# Patient Record
Sex: MALE | Race: WHITE | URBAN - METROPOLITAN AREA
[De-identification: names, ages, dates, MRNs, and addresses within clinical notes are randomized per-mention and may not be internally consistent; named-entity substitution may affect disease eponyms.]

---

## 2017-03-01 ENCOUNTER — OUTPATIENT (OUTPATIENT)
Dept: OUTPATIENT SERVICES | Facility: HOSPITAL | Age: 67
LOS: 1 days | Discharge: HOME | End: 2017-03-01

## 2017-06-27 DIAGNOSIS — M65.332 TRIGGER FINGER, LEFT MIDDLE FINGER: ICD-10-CM

## 2020-07-17 ENCOUNTER — INPATIENT (INPATIENT)
Facility: HOSPITAL | Age: 70
LOS: 4 days | Discharge: HOME | End: 2020-07-22
Attending: HOSPITALIST | Admitting: HOSPITALIST
Payer: MEDICARE

## 2020-07-17 VITALS
TEMPERATURE: 98 F | SYSTOLIC BLOOD PRESSURE: 164 MMHG | DIASTOLIC BLOOD PRESSURE: 93 MMHG | RESPIRATION RATE: 19 BRPM | OXYGEN SATURATION: 99 % | HEART RATE: 87 BPM

## 2020-07-17 DIAGNOSIS — E78.00 PURE HYPERCHOLESTEROLEMIA, UNSPECIFIED: Chronic | ICD-10-CM

## 2020-07-17 LAB
ALBUMIN SERPL ELPH-MCNC: 4.5 G/DL — SIGNIFICANT CHANGE UP (ref 3.5–5.2)
ALP SERPL-CCNC: 69 U/L — SIGNIFICANT CHANGE UP (ref 30–115)
ALT FLD-CCNC: 19 U/L — SIGNIFICANT CHANGE UP (ref 0–41)
ANION GAP SERPL CALC-SCNC: 13 MMOL/L — SIGNIFICANT CHANGE UP (ref 7–14)
APTT BLD: 35.3 SEC — SIGNIFICANT CHANGE UP (ref 27–39.2)
AST SERPL-CCNC: 19 U/L — SIGNIFICANT CHANGE UP (ref 0–41)
BASOPHILS # BLD AUTO: 0.08 K/UL — SIGNIFICANT CHANGE UP (ref 0–0.2)
BASOPHILS NFR BLD AUTO: 0.7 % — SIGNIFICANT CHANGE UP (ref 0–1)
BILIRUB SERPL-MCNC: 0.2 MG/DL — SIGNIFICANT CHANGE UP (ref 0.2–1.2)
BUN SERPL-MCNC: 30 MG/DL — HIGH (ref 10–20)
CALCIUM SERPL-MCNC: 9.8 MG/DL — SIGNIFICANT CHANGE UP (ref 8.5–10.1)
CHLORIDE SERPL-SCNC: 102 MMOL/L — SIGNIFICANT CHANGE UP (ref 98–110)
CO2 SERPL-SCNC: 25 MMOL/L — SIGNIFICANT CHANGE UP (ref 17–32)
CREAT SERPL-MCNC: 1.2 MG/DL — SIGNIFICANT CHANGE UP (ref 0.7–1.5)
EOSINOPHIL # BLD AUTO: 0.37 K/UL — SIGNIFICANT CHANGE UP (ref 0–0.7)
EOSINOPHIL NFR BLD AUTO: 3.1 % — SIGNIFICANT CHANGE UP (ref 0–8)
GLUCOSE SERPL-MCNC: 97 MG/DL — SIGNIFICANT CHANGE UP (ref 70–99)
HCT VFR BLD CALC: 36.9 % — LOW (ref 42–52)
HGB BLD-MCNC: 12.1 G/DL — LOW (ref 14–18)
IMM GRANULOCYTES NFR BLD AUTO: 0.4 % — HIGH (ref 0.1–0.3)
INR BLD: 1 RATIO — SIGNIFICANT CHANGE UP (ref 0.65–1.3)
LACTATE SERPL-SCNC: 1.4 MMOL/L — SIGNIFICANT CHANGE UP (ref 0.7–2)
LIDOCAIN IGE QN: 37 U/L — SIGNIFICANT CHANGE UP (ref 7–60)
LYMPHOCYTES # BLD AUTO: 2.41 K/UL — SIGNIFICANT CHANGE UP (ref 1.2–3.4)
LYMPHOCYTES # BLD AUTO: 20 % — LOW (ref 20.5–51.1)
MAGNESIUM SERPL-MCNC: 1.9 MG/DL — SIGNIFICANT CHANGE UP (ref 1.8–2.4)
MCHC RBC-ENTMCNC: 29.4 PG — SIGNIFICANT CHANGE UP (ref 27–31)
MCHC RBC-ENTMCNC: 32.8 G/DL — SIGNIFICANT CHANGE UP (ref 32–37)
MCV RBC AUTO: 89.8 FL — SIGNIFICANT CHANGE UP (ref 80–94)
MONOCYTES # BLD AUTO: 0.8 K/UL — HIGH (ref 0.1–0.6)
MONOCYTES NFR BLD AUTO: 6.6 % — SIGNIFICANT CHANGE UP (ref 1.7–9.3)
NEUTROPHILS # BLD AUTO: 8.35 K/UL — HIGH (ref 1.4–6.5)
NEUTROPHILS NFR BLD AUTO: 69.2 % — SIGNIFICANT CHANGE UP (ref 42.2–75.2)
NRBC # BLD: 0 /100 WBCS — SIGNIFICANT CHANGE UP (ref 0–0)
PLATELET # BLD AUTO: 302 K/UL — SIGNIFICANT CHANGE UP (ref 130–400)
POTASSIUM SERPL-MCNC: 4.4 MMOL/L — SIGNIFICANT CHANGE UP (ref 3.5–5)
POTASSIUM SERPL-SCNC: 4.4 MMOL/L — SIGNIFICANT CHANGE UP (ref 3.5–5)
PROT SERPL-MCNC: 7.5 G/DL — SIGNIFICANT CHANGE UP (ref 6–8)
PROTHROM AB SERPL-ACNC: 11.5 SEC — SIGNIFICANT CHANGE UP (ref 9.95–12.87)
RBC # BLD: 4.11 M/UL — LOW (ref 4.7–6.1)
RBC # FLD: 13 % — SIGNIFICANT CHANGE UP (ref 11.5–14.5)
SODIUM SERPL-SCNC: 140 MMOL/L — SIGNIFICANT CHANGE UP (ref 135–146)
TROPONIN T SERPL-MCNC: <0.01 NG/ML — SIGNIFICANT CHANGE UP
WBC # BLD: 12.06 K/UL — HIGH (ref 4.8–10.8)
WBC # FLD AUTO: 12.06 K/UL — HIGH (ref 4.8–10.8)

## 2020-07-17 PROCEDURE — 99285 EMERGENCY DEPT VISIT HI MDM: CPT

## 2020-07-17 PROCEDURE — 74177 CT ABD & PELVIS W/CONTRAST: CPT | Mod: 26

## 2020-07-17 PROCEDURE — 76705 ECHO EXAM OF ABDOMEN: CPT | Mod: 26

## 2020-07-17 RX ORDER — MORPHINE SULFATE 50 MG/1
4 CAPSULE, EXTENDED RELEASE ORAL ONCE
Refills: 0 | Status: DISCONTINUED | OUTPATIENT
Start: 2020-07-17 | End: 2020-07-17

## 2020-07-17 RX ORDER — FAMOTIDINE 10 MG/ML
20 INJECTION INTRAVENOUS ONCE
Refills: 0 | Status: COMPLETED | OUTPATIENT
Start: 2020-07-17 | End: 2020-07-17

## 2020-07-17 RX ORDER — SODIUM CHLORIDE 9 MG/ML
1000 INJECTION INTRAMUSCULAR; INTRAVENOUS; SUBCUTANEOUS ONCE
Refills: 0 | Status: COMPLETED | OUTPATIENT
Start: 2020-07-17 | End: 2020-07-17

## 2020-07-17 RX ADMIN — SODIUM CHLORIDE 1000 MILLILITER(S): 9 INJECTION INTRAMUSCULAR; INTRAVENOUS; SUBCUTANEOUS at 23:56

## 2020-07-17 RX ADMIN — SODIUM CHLORIDE 1000 MILLILITER(S): 9 INJECTION INTRAMUSCULAR; INTRAVENOUS; SUBCUTANEOUS at 19:04

## 2020-07-17 RX ADMIN — MORPHINE SULFATE 4 MILLIGRAM(S): 50 CAPSULE, EXTENDED RELEASE ORAL at 23:56

## 2020-07-17 RX ADMIN — FAMOTIDINE 20 MILLIGRAM(S): 10 INJECTION INTRAVENOUS at 23:42

## 2020-07-17 NOTE — ED ADULT NURSE NOTE - NSIMPLEMENTINTERV_GEN_ALL_ED
Implemented All Fall Risk Interventions:  [place] to call system. Call bell, personal items and telephone within reach. Instruct patient to call for assistance. Room bathroom lighting operational. Non-slip footwear when patient is off stretcher. Physically safe environment: no spills, clutter or unnecessary equipment. Stretcher in lowest position, wheels locked, appropriate side rails in place. Provide visual cue, wrist band, yellow gown, etc. Monitor gait and stability. Monitor for mental status changes and reorient to person, place, and time. Review medications for side effects contributing to fall risk. Reinforce activity limits and safety measures with patient and family.

## 2020-07-17 NOTE — CONSULT NOTE ADULT - ASSESSMENT
DISTAL ANTRUM 4 CM  FOREIGN BODY  NPO  IVF  PAIN MGMT  GI  CONSULT   DVT/ GI PROPHYLAXIS  WILL FOLLOW  MARC PEREZ

## 2020-07-17 NOTE — ED ADULT NURSE NOTE - HOW OFTEN DO YOU HAVE A DRINK CONTAINING ALCOHOL?
& right thoracic abscess with fistula causing severe sepsis, CT chest reviewed on IV Invanz, Blood cx, wound cx are pending, Lactate wnl  Dr. Mccormick did I&D yesterday, ID is on board, patient has wound vac on, will follow the cultures. Never

## 2020-07-17 NOTE — ED PROVIDER NOTE - PROGRESS NOTE DETAILS
discussed with radiology who states that patient has foreign body in the stomach extending through stomach lining. Surgery consulted Surgical PA consulted on CT findings Patient evaluated by surgery states not surgical intervention at this time. Recommending GI evaluation. Discussed with Dr. Doe Lopez from  who will attempt to reach Dr. Smith who is on call for [facility]. BI: d/w [place] pulm fellow who approved for [facility]. Will admit to [place]. BI: New progression of events. GI, Dr. Man en route to [place] site for bedside endoscopy. Requesting ICU here. Withdrew admission to north. Doe molina approved. ICU resident aware. Hospitalist accepting.

## 2020-07-17 NOTE — ED PROVIDER NOTE - CLINICAL SUMMARY MEDICAL DECISION MAKING FREE TEXT BOX
Patient presents with epigastric pain. labs, ua, ct done. Found to have foreign body in the stomach penetrating into the abdominal cavity. Discussed with surgery Dr. Cordero who signed off on case. Discussed with Dr. Man from GI who is en route to the [place] site to scope the patient. Patient admitted to the ICU for further management.

## 2020-07-17 NOTE — ED PROVIDER NOTE - OBJECTIVE STATEMENT
Patient c/o epigastric today, mild sharp, non radiating, H/o gallstone, No n/v, no chest pain, no SOB, no fever,

## 2020-07-17 NOTE — CONSULT NOTE ADULT - SUBJECTIVE AND OBJECTIVE BOX
· Chief Complaint: The patient is a 69y Male complaining of abdominal pain.	  · HPI Objective Statement: Patient c/o epigastric x 5 days , pain went  away , pain restarted  today after eating , mild sharp, non radiating, H/o gallstone, No n/v, no chest pain, no SOB, no fever,	    HIV:    HIV Test Questions:  · In accordance with [place] State law, we offer every patient who comes to our ED an HIV test. Would you like to be tested today?	Opt out	    PAST MEDICAL/SURGICAL/FAMILY/SOCIAL HISTORY:    Past Medical History:  hld.     Past Surgical History:  none     Tobacco Usage:  · Tobacco Usage	Former smoker	    ALLERGIES AND HOME MEDICATIONS:   Allergies:        Allergies:  	No Known Allergies:     Home Medications:   * Patient Currently Takes Medications as of 17-Jul-2020 17:43 documented in Structured Notes  · 	atorvastatin:      REVIEW OF SYSTEMS:    Review of Systems:  · CONSTITUTIONAL: no fever and no chills.	  · EYES: no discharge, no irritation, no pain, no redness, and no visual changes.	  · ENMT: Ears: no ear pain and no hearing problems.Nose: no nasal congestion and no nasal drainage.Mouth/Throat: no dysphagia, no hoarseness and no throat pain.Neck: no lumps, no pain, no stiffness and no swollen glands.	  · CARDIOVASCULAR: no chest pain and no edema.	  · RESPIRATORY: no chest pain, no cough, and no shortness of breath.	  · GASTROINTESTINAL: - - -	  · Gastrointestinal [+]: ABDOMINAL PAIN	  · GENITOURINARY: no dysuria, no frequency, and no hematuria.	  · MUSCULOSKELETAL: no back pain, no gout, no musculoskeletal pain, no neck pain, and no weakness.	  · NEURO: no loss of consciousness, no gait abnormality, no headache, no sensory deficits, and no weakness.	  · ENDOCRINE: no diabetes and no thyroid trouble.	    PHYSICAL EXAM:  T 98.4, /93,  HR  87, RR 19  · CONSTITUTIONAL: Well appearing, awake, alert, oriented to person, place, time/situation and in no apparent distress.	  · ENMT: Airway patent, Nasal mucosa clear. Mouth with normal mucosa. Throat has no vesicles, no oropharyngeal exudates and uvula is midline.	  · EYES: Clear bilaterally, pupils equal, round and reactive to light.	  · CARDIAC: Normal rate, regular rhythm.  Heart sounds S1, S2.  No murmurs, rubs or gallops.	  · RESPIRATORY: Breath sounds clear and equal bilaterally.	  · GASTROINTESTINAL: Abdomen soft,+ EPIGASTRIC  TENDERNESS no guarding.	  · MUSCULOSKELETAL: Spine appears normal, range of motion is not limited, no muscle or joint tenderness	  · NEUROLOGICAL: Alert and oriented, no focal deficits, no motor or sensory deficits.	  · SKIN: Skin normal color for race, warm, dry and intact. No evidence of rash.	    CURRENT ORDERS/:   · 	sodium chloride 0.9% Bolus, 1000 milliLiter(s), IV Bolus, once, infuse over 1 Hour(s), Stop After 1 Doses  	Provider's Contact #: [phone], 17-Jul-2020, Active, 17-Jul-2020, Standard  · 	Blood Glucose Point Of Care Testing,   Frequency:  once, 17-Jul-2020, Active, Standard    RESULTS:   · EKG Date/Time: 17-Jul-2020 19:58	  · Rate: 75	  · Interpretation: normal sinus rhythm	  · MS: 166	  · QRS: 84	  · ST/T Wave: no aislinn	                        12.1   12.06 )-----------( 302      ( 17 Jul 2020 17:48 )             36.9   07-17    140  |  102  |  30<H>  ----------------------------<  97  4.4   |  25  |  1.2    Ca    9.8      17 Jul 2020 17:48  Mg     1.9     07-17    TPro  7.5  /  Alb  4.5  /  TBili  0.2  /  DBili  x   /  AST  19  /  ALT  19  /  AlkPhos  69  07-17  LIPASE  37, LACTATE  1.4  EXAM:  US ABDOMEN LIMITED            PROCEDURE DATE:  07/17/2020            INTERPRETATION:  CLINICAL HISTORY: Right upper quadrant abdominal pain.     COMPARISON: None.    PROCEDURE: Ultrasound of the right upper quadrant was performed.    FINDINGS:    LIVER: Heterogeneous in echotexture. 1.8 x 2.1 x 2.0 cm indeterminate heterogeneous mass in the right hepatic lobe.    GALLBLADDER/BILIARY TREE:  No evidence of cholelithiasis. Borderline gallbladder wall thickening, measuring 3 to 4 mm. Trace pericholecystic fluid/inflammation.  Negative sonographic Christiansen's sign. No intrahepatic biliary ductal dilatation. The common bile duct measures 5 mm, which is normal.     PANCREAS: Obscured by overlying bowel gas.    KIDNEY:  Right kidney measures 12.3 cm in length. Prominent column of Jose Alberto. No hydronephrosis, calculi or solid mass.    AORTA/IVC:  Visualized proximal portions unremarkable.    ASCITES:  None.    IMPRESSION:    1. Trace pericholecystic fluid/inflammation and borderline gallbladder wall thickening, nonspecific. No definite sonographic evidence for acute cholecystitis.     2. Indeterminate hepatic mass. Nonemergent MRI recommended for further evaluation.              EDSON BRINK M.D., RESIDENT RADIOLOGIST  This document has been electronically signed.  MICHAEL ZAPATA M.D., ATTENDING RADIOLOGIST  This document has been electronically signed. Jul 17 2020  8:31PM        EXAM:  CT ABDOMEN AND PELVIS IC            PROCEDURE DATE:  07/17/2020            INTERPRETATION:  REASON FOR EXAM / CLINICAL STATEMENT: Epigastric pain; WBC 12.06    TECHNIQUE: Contiguous axial CT images were obtained from the lower chest to the pubic symphysis with intravenous contrast.   Reformatted images in the coronal and sagittal planes were acquired.    COMPARISON CT:    OTHER STUDIES USED FOR CORRELATION: None.         FINDINGS    LOWER CHEST: The lung bases are clear. No pleural or pericardial effusion.    HEPATIC: The liver is normal in size with no evidence of bile duct dilatation. There is a 2 cm solid-appearing lesion in the left lobe of liver. Nonemergent MRI of the liver is recommended for further evaluation of this lesion.    BILIARY: No calcified gallstones are noted.      SPLEEN: Unremarkable.        PANCREAS: The pancreas is normal in size and configuration. No evidence of mass or pancreatitis.     ADRENAL GLANDS: Unremarkable.        KIDNEYS: There is symmetric bilateral renal enhancement. No evidence of hydronephrosis, calcified stones, or solid mass. Lobulation of both kidneys noted compatible with scarring.    ABDOMINOPELVIC NODES: Unremarkable.    PELVIC ORGANS: No evidence of pelvic mass, lymphadenopathy, or fluid collection.    PERITONEUM/MESENTERY/BOWEL: A radiopaque linear opacity, compatible with a foreign body, measuring approximately 4 cm in length is seen in the region of the prepyloric region of the distal antrum.. This structure (possibly a fish bone, metallic wire or other foreign body) extends extraluminally toward the region of the sonia hepatis. The foreign body appears to be adjacent to the liver without puncturing the liver.     No evidence of bowel obstruction, colitis, inflammatory process, or ascites within the abdomen or pelvis. There is a nonobstructing ventral hernia. The appendix is normal in appearance.    BONES/SOFT TISSUES: Degenerative changes of the spine are noted.       OTHER: No evidence of abdominal aortic aneurysm.      IMPRESSION:    1. A radiopaque linear opacity, compatible with a foreign body, measuring approximately 4 cm in length is seen in the region of the prepyloric region of the distal antrum.. This structure (possibly a fish bone, metallic wire or other foreign body) extends extraluminally toward the region of the sonia hepatis. The foreign body appears to be adjacent to the liver without puncturing the liver.     2. There is a 2 cm solid-appearing lesion in the left lobe of liver. Nonemergent MRI of the liver is recommended for further evaluation of this lesion. (The renal parenchyma may be evaluated at the same time.)                  NAE MASTERSON M.D., ATTENDING RADIOLOGIST  This document has been electronically signed. Jul 17 2020  8:54PM

## 2020-07-17 NOTE — ED PROVIDER NOTE - ATTENDING CONTRIBUTION TO CARE
68 yo M pmh of HLD presents with epigastric pain. States that 5 days ago developed a severe epigastric and RUQ pain that resolved. Today went to his pmd and had blood work done. When he got home he had something to eat and the pain returned. no n/v/d. normal BM. no fevers. no cp, no sob, no palpitations. pmd is Dr. Arias.     CONSTITUTIONAL: Well-developed; well-nourished; in no acute distress.   SKIN: warm, dry  HEAD: Normocephalic; atraumatic.  EYES: PERRL, EOMI, no conjunctival erythema  ENT: No nasal discharge; airway clear.  NECK: Supple; non tender.  CARD: S1, S2 normal;  Regular rate and rhythm.   RESP: No wheezes, rales or rhonchi.  ABD: soft, + tenderness in epigastric and RUQ, non distended, no rebound or guarding  EXT: Normal ROM.    LYMPH: No acute cervical adenopathy.  NEURO: Alert, oriented, grossly unremarkable.   PSYCH: Cooperative, appropriate.

## 2020-07-18 ENCOUNTER — TRANSCRIPTION ENCOUNTER (OUTPATIENT)
Age: 70
End: 2020-07-18

## 2020-07-18 LAB
ALBUMIN SERPL ELPH-MCNC: 4.7 G/DL — SIGNIFICANT CHANGE UP (ref 3.5–5.2)
ALP SERPL-CCNC: 77 U/L — SIGNIFICANT CHANGE UP (ref 30–115)
ALT FLD-CCNC: 19 U/L — SIGNIFICANT CHANGE UP (ref 0–41)
ANION GAP SERPL CALC-SCNC: 15 MMOL/L — HIGH (ref 7–14)
AST SERPL-CCNC: 22 U/L — SIGNIFICANT CHANGE UP (ref 0–41)
BASOPHILS # BLD AUTO: 0.05 K/UL — SIGNIFICANT CHANGE UP (ref 0–0.2)
BASOPHILS NFR BLD AUTO: 0.4 % — SIGNIFICANT CHANGE UP (ref 0–1)
BILIRUB SERPL-MCNC: 0.4 MG/DL — SIGNIFICANT CHANGE UP (ref 0.2–1.2)
BLD GP AB SCN SERPL QL: SIGNIFICANT CHANGE UP
BUN SERPL-MCNC: 18 MG/DL — SIGNIFICANT CHANGE UP (ref 10–20)
CALCIUM SERPL-MCNC: 9.7 MG/DL — SIGNIFICANT CHANGE UP (ref 8.5–10.1)
CHLORIDE SERPL-SCNC: 100 MMOL/L — SIGNIFICANT CHANGE UP (ref 98–110)
CO2 SERPL-SCNC: 22 MMOL/L — SIGNIFICANT CHANGE UP (ref 17–32)
CREAT SERPL-MCNC: 1.3 MG/DL — SIGNIFICANT CHANGE UP (ref 0.7–1.5)
EOSINOPHIL # BLD AUTO: 0.04 K/UL — SIGNIFICANT CHANGE UP (ref 0–0.7)
EOSINOPHIL NFR BLD AUTO: 0.3 % — SIGNIFICANT CHANGE UP (ref 0–8)
GLUCOSE SERPL-MCNC: 112 MG/DL — HIGH (ref 70–99)
HCT VFR BLD CALC: 38.6 % — LOW (ref 42–52)
HGB BLD-MCNC: 12.7 G/DL — LOW (ref 14–18)
IMM GRANULOCYTES NFR BLD AUTO: 0.4 % — HIGH (ref 0.1–0.3)
LYMPHOCYTES # BLD AUTO: 0.83 K/UL — LOW (ref 1.2–3.4)
LYMPHOCYTES # BLD AUTO: 6.7 % — LOW (ref 20.5–51.1)
MCHC RBC-ENTMCNC: 30 PG — SIGNIFICANT CHANGE UP (ref 27–31)
MCHC RBC-ENTMCNC: 32.9 G/DL — SIGNIFICANT CHANGE UP (ref 32–37)
MCV RBC AUTO: 91 FL — SIGNIFICANT CHANGE UP (ref 80–94)
MONOCYTES # BLD AUTO: 0.13 K/UL — SIGNIFICANT CHANGE UP (ref 0.1–0.6)
MONOCYTES NFR BLD AUTO: 1 % — LOW (ref 1.7–9.3)
NEUTROPHILS # BLD AUTO: 11.32 K/UL — HIGH (ref 1.4–6.5)
NEUTROPHILS NFR BLD AUTO: 91.2 % — HIGH (ref 42.2–75.2)
NRBC # BLD: 0 /100 WBCS — SIGNIFICANT CHANGE UP (ref 0–0)
PLATELET # BLD AUTO: 301 K/UL — SIGNIFICANT CHANGE UP (ref 130–400)
POTASSIUM SERPL-MCNC: 4.9 MMOL/L — SIGNIFICANT CHANGE UP (ref 3.5–5)
POTASSIUM SERPL-SCNC: 4.9 MMOL/L — SIGNIFICANT CHANGE UP (ref 3.5–5)
PROT SERPL-MCNC: 7.8 G/DL — SIGNIFICANT CHANGE UP (ref 6–8)
RBC # BLD: 4.24 M/UL — LOW (ref 4.7–6.1)
RBC # FLD: 12.9 % — SIGNIFICANT CHANGE UP (ref 11.5–14.5)
SARS-COV-2 RNA SPEC QL NAA+PROBE: SIGNIFICANT CHANGE UP
SODIUM SERPL-SCNC: 137 MMOL/L — SIGNIFICANT CHANGE UP (ref 135–146)
WBC # BLD: 12.42 K/UL — HIGH (ref 4.8–10.8)
WBC # FLD AUTO: 12.42 K/UL — HIGH (ref 4.8–10.8)

## 2020-07-18 PROCEDURE — 71045 X-RAY EXAM CHEST 1 VIEW: CPT | Mod: 26

## 2020-07-18 PROCEDURE — 99222 1ST HOSP IP/OBS MODERATE 55: CPT

## 2020-07-18 RX ORDER — CHLORHEXIDINE GLUCONATE 213 G/1000ML
1 SOLUTION TOPICAL DAILY
Refills: 0 | Status: DISCONTINUED | OUTPATIENT
Start: 2020-07-18 | End: 2020-07-22

## 2020-07-18 RX ORDER — CEFTRIAXONE 500 MG/1
1000 INJECTION, POWDER, FOR SOLUTION INTRAMUSCULAR; INTRAVENOUS EVERY 24 HOURS
Refills: 0 | Status: DISCONTINUED | OUTPATIENT
Start: 2020-07-18 | End: 2020-07-18

## 2020-07-18 RX ORDER — SODIUM CHLORIDE 9 MG/ML
1000 INJECTION INTRAMUSCULAR; INTRAVENOUS; SUBCUTANEOUS
Refills: 0 | Status: DISCONTINUED | OUTPATIENT
Start: 2020-07-18 | End: 2020-07-18

## 2020-07-18 RX ORDER — POLYETHYLENE GLYCOL 3350 17 G/17G
17 POWDER, FOR SOLUTION ORAL DAILY
Refills: 0 | Status: DISCONTINUED | OUTPATIENT
Start: 2020-07-18 | End: 2020-07-22

## 2020-07-18 RX ORDER — PANTOPRAZOLE SODIUM 20 MG/1
40 TABLET, DELAYED RELEASE ORAL DAILY
Refills: 0 | Status: DISCONTINUED | OUTPATIENT
Start: 2020-07-18 | End: 2020-07-18

## 2020-07-18 RX ORDER — SENNA PLUS 8.6 MG/1
2 TABLET ORAL AT BEDTIME
Refills: 0 | Status: DISCONTINUED | OUTPATIENT
Start: 2020-07-18 | End: 2020-07-22

## 2020-07-18 RX ORDER — ATORVASTATIN CALCIUM 80 MG/1
0 TABLET, FILM COATED ORAL
Qty: 0 | Refills: 0 | DISCHARGE

## 2020-07-18 RX ORDER — CHLORHEXIDINE GLUCONATE 213 G/1000ML
1 SOLUTION TOPICAL
Refills: 0 | Status: DISCONTINUED | OUTPATIENT
Start: 2020-07-18 | End: 2020-07-18

## 2020-07-18 RX ORDER — METRONIDAZOLE 500 MG
500 TABLET ORAL EVERY 8 HOURS
Refills: 0 | Status: DISCONTINUED | OUTPATIENT
Start: 2020-07-18 | End: 2020-07-18

## 2020-07-18 RX ORDER — ATORVASTATIN CALCIUM 80 MG/1
40 TABLET, FILM COATED ORAL DAILY
Refills: 0 | Status: DISCONTINUED | OUTPATIENT
Start: 2020-07-18 | End: 2020-07-18

## 2020-07-18 RX ORDER — PANTOPRAZOLE SODIUM 20 MG/1
40 TABLET, DELAYED RELEASE ORAL
Refills: 0 | Status: DISCONTINUED | OUTPATIENT
Start: 2020-07-18 | End: 2020-07-20

## 2020-07-18 RX ADMIN — ATORVASTATIN CALCIUM 40 MILLIGRAM(S): 80 TABLET, FILM COATED ORAL at 11:47

## 2020-07-18 RX ADMIN — MORPHINE SULFATE 4 MILLIGRAM(S): 50 CAPSULE, EXTENDED RELEASE ORAL at 00:00

## 2020-07-18 RX ADMIN — SODIUM CHLORIDE 50 MILLILITER(S): 9 INJECTION INTRAMUSCULAR; INTRAVENOUS; SUBCUTANEOUS at 02:01

## 2020-07-18 RX ADMIN — Medication 100 MILLIGRAM(S): at 06:07

## 2020-07-18 RX ADMIN — SODIUM CHLORIDE 50 MILLILITER(S): 9 INJECTION INTRAMUSCULAR; INTRAVENOUS; SUBCUTANEOUS at 04:15

## 2020-07-18 RX ADMIN — PANTOPRAZOLE SODIUM 40 MILLIGRAM(S): 20 TABLET, DELAYED RELEASE ORAL at 11:47

## 2020-07-18 RX ADMIN — CEFTRIAXONE 100 MILLIGRAM(S): 500 INJECTION, POWDER, FOR SOLUTION INTRAMUSCULAR; INTRAVENOUS at 06:04

## 2020-07-18 RX ADMIN — SENNA PLUS 2 TABLET(S): 8.6 TABLET ORAL at 21:40

## 2020-07-18 NOTE — H&P ADULT - ASSESSMENT
70 yo M w/ PMH of HTN and HLD presents to the ED complaining of epigastric pain started three days prior. Admitted to ICU for close monitoring during EGD procedure to retrieve foreign body in distal antrum.    # Epigastric pain likely secondary to foreign body in distal antrum  - as per signup, GI planned for EGD to retrieve foreign body   - GI f/u   - surgery f/u   - keep active type and screen  - check INR, PTT, CBC and CMP    # Leukocytosis and subjective chills and fever  - start abx w/ ceftriaxone and flagyl to cover for intraabdominal infection  - trend CBC  - obtain blood culture    # 2 cm liver lesion  - discussed with patient, OP MRI    # HTN   - hold lisinopril for now     # HLD   - c/w statin     Diet: NPO  GI ppx: Protonix 40 mg qD  DVT ppx: Hold for now   Activity: Increase as tolerated  Code status: Full Code ( X ) / DNR (  ) / DNI (  )  Disposition: requires ICU monitoring.

## 2020-07-18 NOTE — H&P ADULT - NSHPPHYSICALEXAM_GEN_ALL_CORE
PHYSICAL EXAM:  GENERAL: NAD, AAO x 4, 69y M  HEAD:  Atraumatic, Normocephalic  EYES: EOMI, conjunctiva clear and sclera white  NECK: Supple, No JVD  CHEST/LUNG: Clear to auscultation bilaterally; No wheeze; No crackles; No accessory muscles used  HEART: Regular rate and rhythm; No murmurs;   ABDOMEN: Soft, Nondistended; Bowel sounds present; No guarding  EXTREMITIES:  2+ Peripheral Pulses, No cyanosis or edema  NEUROLOGY: non-focal

## 2020-07-18 NOTE — CONSULT NOTE ADULT - ATTENDING COMMENTS
As noted above, patient transferred to [facility] for intervention.
70 yo male patient.  He ate fish in early July and BBQ.  Developed abdominal pian that became worse yesterday.  [place]  Stable and abdomen soft, NT, ND.    CT scan with foreign body through antrum into abdominal cavity (half and half), anterior to sonia hepatis.    a/P;  GI to scope patient today for extraction.  We will standby in case laparoscopy is needed.  NPO, IVF.
Patient seen and examined, agree with above, epigastric pain ( foreign body/ stomach, ? bone ), GI eval for ED if no procedure today, downgrade to floor

## 2020-07-18 NOTE — CONSULT NOTE ADULT - ASSESSMENT
IMPRESSION:    Epigastric pain - Likely foreign body ? in the prepyloric region of the distal antrum  Elevated White COUNT ; No fever - doubt infection      PLAN:    CNS: Avoid CNS depressants.      HEENT: Oral care    PULMONARY:  HOB @ 45 degrees. Aspiration Precautions . CXR today     CARDIOVASCULAR: c/w NS @ 50     GI: GI prophylaxis.  Feeding NPO  . EGD today . IR and surgery on board as requested by GI    RENAL:  Follow up lytes.  Correct as needed    INFECTIOUS DISEASE: Follow up cultures. ABX Keep rocephin , can d/c flagyl. Order Procalcitonin      HEMATOLOGICAL:  F/U Labs today . DVT prophylaxis.    ENDOCRINE:  Follow up FS.  Insulin protocol if needed.    MUSCULOSKELETAL: Bedrest , OOB to chair    Smoking cessation advised    Benitez: No benitez  Lines: Peripheral IV   Code status: Full code  Disposition: Disposition post EGD IMPRESSION:    Epigastric pain - foreign body ? / bone in the prepyloric region of the distal antrum  Elevated White COUNT ; No fever - doubt infection      PLAN:    CNS: Avoid CNS depressants.      HEENT: Oral care    PULMONARY:  HOB @ 45 degrees. Aspiration Precautions . CXR today     CARDIOVASCULAR: c/w NS, LA    GI: GI prophylaxis.  Feeding NPO  . EGD today . IR and surgery on board as requested by GI    RENAL:  Follow up lytes.  Correct as needed    INFECTIOUS DISEASE: Follow up cultures.    HEMATOLOGICAL:  F/U Labs today . DVT prophylaxis.    ENDOCRINE:  Follow up FS.  Insulin protocol if needed.    MUSCULOSKELETAL: Bedrest , OOB to chair    Smoking cessation advised    Benitez: No benitez  Lines: Peripheral IV   Code status: Full code  Disposition: Disposition post EGD

## 2020-07-18 NOTE — CHART NOTE - NSCHARTNOTEFT_GEN_A_CORE
Endoscopy was performed in OR. Foreign body not seen during examination, but an area in the antrum looks inflamed and edematous that may correspond to the foreign body that is embedded in the antral mucosa.   Procedure terminated.   Plan:   Case discussed with advanced GI Dr Graff and patient will be scheduled for Procedure on monday for EUS/ foreign body removal   Clear liquid diet now   NPO after midnight on sunday   PPI once daily

## 2020-07-18 NOTE — CONSULT NOTE ADULT - SUBJECTIVE AND OBJECTIVE BOX
YUMIKO GALINDO [id number]  69y Male  1d    HPI:  68 yo M w/ PMH of HTN and HLD presents to the ED complaining of epigastric pain started three days prior. Pt describes the epigastric pain to be dull and intermittent. Nothing makes it better or worsen. The story goes back to July 4th when he had a party where he ate a lot of food. He started developing the same epigastric pain he presented with today but quickly went away next day. Pt didn't have any more epigastric pain until today. Pt denies fever, CP, SOB, palpitation, diarrhea, constipation.     In ED, pt underwent CT AP which shows a foreign body. GI consulted and recommended endoscopy, with surgery onboard due the high risk nature of this procedure    Vital Signs Last 24 Hrs  T(C): 37.4 (17 Jul 2020 21:45), Max: 37.4 (17 Jul 2020 21:45)  T(F): 99.3 (17 Jul 2020 21:45), Max: 99.3 (17 Jul 2020 21:45)  HR: 88 (17 Jul 2020 23:55) (77 - 88)  BP: 148/82 (17 Jul 2020 23:55) (148/82 - 164/93)  BP(mean): --  RR: 18 (17 Jul 2020 23:55) (18 - 19)  SpO2: 97% (17 Jul 2020 23:55) (97% - 99%)    PAST MEDICAL & SURGICAL HISTORY:  Hypertension  High blood cholesterol    MEDICATIONS  (STANDING):  atorvastatin Oral Tab/Cap - Peds 40 milliGRAM(s) Oral daily  cefTRIAXone   IVPB 1000 milliGRAM(s) IV Intermittent every 24 hours  metroNIDAZOLE  IVPB 500 milliGRAM(s) IV Intermittent every 8 hours  sodium chloride 0.9%. 1000 milliLiter(s) (50 mL/Hr) IV Continuous <Continuous>    MEDICATIONS  (PRN):    Allergies    No Known Allergies    REVIEW OF SYSTEMS    [x ] A ten-point review of systems was otherwise negative except as noted.  [ ] Due to altered mental status/intubation, subjective information were not able to be obtained from the patient. History was obtained, to the extent possible, from review of the chart and collateral sources of information.      Vital Signs Last 24 Hrs  T(C): 36.4 (18 Jul 2020 04:00), Max: 37.4 (17 Jul 2020 21:45)  T(F): 97.6 (18 Jul 2020 04:00), Max: 99.3 (17 Jul 2020 21:45)  HR: 76 (18 Jul 2020 04:00) (72 - 88)  BP: 159/80 (18 Jul 2020 04:00) (127/82 - 164/93)  BP(mean): 112 (18 Jul 2020 04:00) (112 - 112)  RR: 18 (18 Jul 2020 04:00) (16 - 19)  SpO2: 97% (18 Jul 2020 04:00) (97% - 99%)    PHYSICAL EXAM:  GENERAL: NAD, well-appearing  CHEST/LUNG: Clear to auscultation bilaterally  HEART: Regular rate and rhythm  ABDOMEN: Soft, Nontender, Nondistended;     LABS:                      12.1   12.06 )-----------( 302      ( 17 Jul 2020 17:48 )             36.9       Auto Neutrophil %: 69.2 % (07-17-20 @ 17:48)  Auto Immature Granulocyte %: 0.4 % (07-17-20 @ 17:48)    07-17    140  |  102  |  30<H>  ----------------------------<  97  4.4   |  25  |  1.2      Calcium, Total Serum: 9.8 mg/dL (07-17-20 @ 17:48)      LFTs:             7.5  | 0.2  | 19       ------------------[69      ( 17 Jul 2020 17:48 )  4.5  | x    | 19          Lipase:37     Amylase:x         Lactate, Blood: 1.4 mmol/L (07-17-20 @ 17:48)      Coags:     11.50  ----< 1.00    ( 17 Jul 2020 17:48 )     35.3        CARDIAC MARKERS ( 17 Jul 2020 17:48 )  x     / <0.01 ng/mL / x     / x     / x          RADIOLOGY & ADDITIONAL STUDIES:  < from: CT Abdomen and Pelvis w/ IV Cont (07.17.20 @ 19:49) >    EXAM:  CT ABDOMEN AND PELVIS IC            PROCEDURE DATE:  07/17/2020            INTERPRETATION:  REASON FOR EXAM / CLINICAL STATEMENT: Epigastric pain; WBC 12.06    TECHNIQUE: Contiguous axial CT images were obtained from the lower chest to the pubic symphysis with intravenous contrast.   Reformatted images in the coronal and sagittal planes were acquired.    COMPARISON CT:    OTHER STUDIES USED FOR CORRELATION: None.         FINDINGS    LOWER CHEST: The lung bases are clear. No pleural or pericardial effusion.    HEPATIC: The liver is normal in size with no evidence of bile duct dilatation. There is a 2 cm solid-appearing lesion in the left lobe of liver. Nonemergent MRI of the liver is recommended for further evaluation of this lesion.    PERITONEUM/MESENTERY/BOWEL: A radiopaque linear opacity, compatible with a foreign body, measuring approximately 4 cm in length is seen in the region of the prepyloric region of the distal antrum.. This structure (possibly a fish bone, metallic wire or other foreign body) extends extraluminally toward the region of the sonia hepatis. The foreign body appears to be adjacent to the liver without puncturing the liver.   No evidence of bowel obstruction, colitis, inflammatory process, or ascites within the abdomen or pelvis. There is a nonobstructing ventral hernia. The appendix is normal in appearance.    IMPRESSION:  1. A radiopaque linear opacity, compatible with a foreign body, measuring approximately 4 cm in length is seen in the region of the prepyloric region of the distal antrum.. This structure (possibly a fish bone, metallic wire or other foreign body) extends extraluminally toward the region of the sonia hepatis. The foreign body appears to be adjacent to the liver without puncturing the liver.   2. There is a 2 cm solid-appearing lesion in the left lobe of liver. Nonemergent MRI of the liver is recommended for further evaluation of this lesion. (The renal parenchyma may be evaluated at the same time.)

## 2020-07-18 NOTE — CONSULT NOTE ADULT - ASSESSMENT
68 yo M w/ PMH of HTN and HLD presents to the ED complaining of epigastric pain started three days prior, found to have foreign body prepyloric region abutting the sonia hepatis. Admitted to ICU for close monitoring during EGD procedure    PLAN:  - will follow with GI regarding EGD and possibility of removing foreign body  - c/w with management per ICU team  - c/w pain control  - c/w hemodynamic monitoring  - c/w DVT, GI PPX 68 yo M w/ PMH of HTN and HLD presents to the ED complaining of epigastric pain started three days prior, found to have foreign body prepyloric region abutting the sonia hepatis. Admitted to ICU for close monitoring during EGD procedure    PLAN:  - will follow with GI regarding EGD and possibility of removing foreign body  - c/w with management per ICU team  - c/w pain control  - c/w hemodynamic monitoring  - c/w DVT, GI PPX    Senior Resident Addendum  69M presenting w/ chief complaint of worsening epigastric pain. He reports first episode of similar pain approximately 2 weeks ago after eating a large meal. He noted recurrence of this sharp epigastric pain yesterday during the day and reports eating sole on Tuesday and ahi tuna on Wednesday. Denies current abdominal pain. CT AP w/ FB in prepyloric region abutting sonia hepatis. Surgery consulted in case of possible need for surgical intervention. Will follow. Case d/w Dr. Dockery, MICU, patient.

## 2020-07-18 NOTE — H&P ADULT - HISTORY OF PRESENT ILLNESS
68 yo M w/ PMH of HTN and HLD presents to the ED complaining of epigastric pain started three days prior. Pt describes the epigastric pain to be dull and intermittent. Nothing makes it better or worsen. The story goes back to July 4th when he had a party where he ate a lot of food. He started developing the same epigastric pain he presented with today but quickly went away next day. Pt didn't have any more epigastric pain until today. Pt admits to have occasional chill. Pt denies fever, CP, SOB, palpitation, diarrhea, constipation.     Vital Signs Last 24 Hrs  T(C): 37.4 (17 Jul 2020 21:45), Max: 37.4 (17 Jul 2020 21:45)  T(F): 99.3 (17 Jul 2020 21:45), Max: 99.3 (17 Jul 2020 21:45)  HR: 88 (17 Jul 2020 23:55) (77 - 88)  BP: 148/82 (17 Jul 2020 23:55) (148/82 - 164/93)  BP(mean): --  RR: 18 (17 Jul 2020 23:55) (18 - 19)  SpO2: 97% (17 Jul 2020 23:55) (97% - 99%)    In ED, pt underwent CT AP which shows a foreign body. Surgery consulted and recommended no sx at this time. GI consulted and recommended endoscopy. ICU upgrade w/ surgery by bedside during procedure due to high risk EGD.

## 2020-07-18 NOTE — CONSULT NOTE ADULT - SUBJECTIVE AND OBJECTIVE BOX
Patient is a 69y old  Male who presents with a chief complaint of Epigastric pain (18 Jul 2020 04:42)      HPI:  70 yo M w/ PMH of HTN and HLD presents to the ED complaining of epigastric pain started three days prior. Pt describes the epigastric pain to be dull and intermittent. Nothing makes it better or worsen. The story goes back to July 4th when he had a party where he ate a lot of food. He started developing the same epigastric pain he presented with today but quickly went away next day. Pt didn't have any more epigastric pain until today. Pt admits to have occasional chill. Pt denies fever, CP, SOB, palpitation, diarrhea, constipation.   reports h/o chocking on chicken bone [place]     Vital Signs Last 24 Hrs  T(C): 37.4 (17 Jul 2020 21:45), Max: 37.4 (17 Jul 2020 21:45)  T(F): 99.3 (17 Jul 2020 21:45), Max: 99.3 (17 Jul 2020 21:45)  HR: 88 (17 Jul 2020 23:55) (77 - 88)  BP: 148/82 (17 Jul 2020 23:55) (148/82 - 164/93)  BP(mean): --  RR: 18 (17 Jul 2020 23:55) (18 - 19)  SpO2: 97% (17 Jul 2020 23:55) (97% - 99%)    In ED, pt underwent CT AP which shows a foreign body. Surgery consulted and recommended no sx at this time. GI consulted and recommended endoscopy. ICU upgrade w/ surgery by bedside during procedure due to high risk EGD. (18 Jul 2020 00:46)      PAST MEDICAL & SURGICAL HISTORY:  Hypertension  High blood cholesterol      SOCIAL HX:   Smoking  active everyday smoker                       ETOH occasional                            Other    FAMILY HISTORY:  :  No known cardiovascular family history     ROS:  See HPI     Allergies    No Known Allergies    Intolerances          PHYSICAL EXAM    ICU Vital Signs Last 24 Hrs  T(C): 36.4 (18 Jul 2020 04:00), Max: 37.4 (17 Jul 2020 21:45)  T(F): 97.6 (18 Jul 2020 04:00), Max: 99.3 (17 Jul 2020 21:45)  HR: 74 (18 Jul 2020 07:00) (72 - 88)  BP: 157/87 (18 Jul 2020 07:00) (127/82 - 172/90)  BP(mean): 118 (18 Jul 2020 07:00) (104 - 121)  ABP: --  ABP(mean): --  RR: 14 (18 Jul 2020 07:00) (14 - 31)  SpO2: 97% (18 Jul 2020 07:00) (97% - 99%)      General: In NAD   HEENT:  TRACEY              Lungs: Bilateral BS  Cardiovascular: Regular  Gastrointestinal: Soft, Positive BS  Musculoskeletal: No clubbing.  Moves all extremities.    Skin: Warm.  Intact  Neurological: No motor or sensory deficit       07-17-20 @ 07:01  -  07-18-20 @ 07:00  --------------------------------------------------------  IN:    IV PiggyBack: 150 mL    sodium chloride 0.9%.: 200 mL  Total IN: 350 mL    OUT:  Total OUT: 0 mL    Total NET: 350 mL      07-18-20 @ 07:01  -  07-18-20 @ 08:01  --------------------------------------------------------  IN:    sodium chloride 0.9%.: 100 mL  Total IN: 100 mL    OUT:  Total OUT: 0 mL    Total NET: 100 mL          LABS:                          12.1   12.06 )-----------( 302      ( 17 Jul 2020 17:48 )             36.9                12.1   12.06 )-----------( 302      ( 07-17 @ 17:48 )             36.9                                               07-17    140  |  102  |  30<H>  ----------------------------<  97  4.4   |  25  |  1.2    Ca    9.8      17 Jul 2020 17:48  Mg     1.9     07-17    TPro  7.5  /  Alb  4.5  /  TBili  0.2  /  DBili  x   /  AST  19  /  ALT  19  /  AlkPhos  69  07-17      PT/INR - ( 17 Jul 2020 17:48 )   PT: 11.50 sec;   INR: 1.00 ratio         PTT - ( 17 Jul 2020 17:48 )  PTT:35.3 sec                                           CARDIAC MARKERS ( 17 Jul 2020 17:48 )  x     / <0.01 ng/mL / x     / x     / x                                                LIVER FUNCTIONS - ( 17 Jul 2020 17:48 )  Alb: 4.5 g/dL / Pro: 7.5 g/dL / ALK PHOS: 69 U/L / ALT: 19 U/L / AST: 19 U/L / GGT: x                                                                                                                                       X-Rays                                                                                     ECHO not done    MEDICATIONS  (STANDING):  atorvastatin Oral Tab/Cap - Peds 40 milliGRAM(s) Oral daily  cefTRIAXone   IVPB 1000 milliGRAM(s) IV Intermittent every 24 hours  chlorhexidine 4% Liquid 1 Application(s) Topical <User Schedule>  metroNIDAZOLE  IVPB 500 milliGRAM(s) IV Intermittent every 8 hours  pantoprazole  Injectable 40 milliGRAM(s) IV Push daily  sodium chloride 0.9%. 1000 milliLiter(s) (50 mL/Hr) IV Continuous <Continuous>    MEDICATIONS  (PRN):        < from: CT Abdomen and Pelvis w/ IV Cont (07.17.20 @ 19:49) >  1. A radiopaque linear opacity, compatible with a foreign body, measuring approximately 4 cm in length is seen in the region of the prepyloric region of the distal antrum.. This structure (possibly a fish bone, metallic wire or other foreign body) extends extraluminally toward the region of the sonia hepatis. The foreign body appears to be adjacent to the liver without puncturing the liver.     2. There is a 2 cm solid-appearing lesion in the left lobe of liver. Nonemergent MRI of the liver is recommended for further evaluation of this lesion. (The renal parenchyma may be evaluated at the same time.)      < end of copied text > Patient is a 69y old  Male who presents with a chief complaint of Epigastric pain (18 Jul 2020 04:42)      HPI:  68 yo M w/ PMH of HTN and HLD presents to the ED complaining of epigastric pain started three days prior. Pt describes the epigastric pain to be dull and intermittent. Nothing makes it better or worsen. The story goes back to July 4th when he had a party where he ate a lot of food. He started developing the same epigastric pain he presented with today but quickly went away next day. Pt didn't have any more epigastric pain until today. Pt admits to have occasional chill. Pt denies fever, CP, SOB, palpitation, diarrhea, constipation.   reports h/o chocking on chicken bone ; transferred from [facility] for advance GI evaluation   In ED, pt underwent CT AP which shows a foreign body. Surgery consulted and recommended no sx at this time. GI consulted and recommended endoscopy. ICU upgrade w/ surgery by bedside during procedure due to high risk EGD. (18 Jul 2020 00:46) ; transferred from [facility] for advance GI evaluation           PAST MEDICAL & SURGICAL HISTORY:  Hypertension  High blood cholesterol      SOCIAL HX:   Smoking  active everyday smoker                       ETOH occasional                            Other    FAMILY HISTORY:  :  No known cardiovascular family history     ROS:  See HPI     Allergies    No Known Allergies    Intolerances          PHYSICAL EXAM    ICU Vital Signs Last 24 Hrs  T(C): 36.4 (18 Jul 2020 04:00), Max: 37.4 (17 Jul 2020 21:45)  T(F): 97.6 (18 Jul 2020 04:00), Max: 99.3 (17 Jul 2020 21:45)  HR: 74 (18 Jul 2020 07:00) (72 - 88)  BP: 157/87 (18 Jul 2020 07:00) (127/82 - 172/90)  BP(mean): 118 (18 Jul 2020 07:00) (104 - 121)  RR: 14 (18 Jul 2020 07:00) (14 - 31)  SpO2: 97% (18 Jul 2020 07:00) (97% - 99%)      General: In NAD , very comfortable  HEENT:  TRACEY              Lungs: Bilateral BS  Cardiovascular: Regular  Gastrointestinal: Soft, Positive BS  Musculoskeletal: No clubbing.  Moves all extremities.    Skin: Warm.  Intact  Neurological: No motor or sensory deficit       07-17-20 @ 07:01  -  07-18-20 @ 07:00  --------------------------------------------------------  IN:    IV PiggyBack: 150 mL    sodium chloride 0.9%.: 200 mL  Total IN: 350 mL    OUT:  Total OUT: 0 mL    Total NET: 350 mL      07-18-20 @ 07:01  -  07-18-20 @ 08:01  --------------------------------------------------------  IN:    sodium chloride 0.9%.: 100 mL  Total IN: 100 mL    OUT:  Total OUT: 0 mL    Total NET: 100 mL          LABS:                          12.1   12.06 )-----------( 302      ( 17 Jul 2020 17:48 )             36.9                12.1   12.06 )-----------( 302      ( 07-17 @ 17:48 )             36.9                                               07-17    140  |  102  |  30<H>  ----------------------------<  97  4.4   |  25  |  1.2    Ca    9.8      17 Jul 2020 17:48  Mg     1.9     07-17    TPro  7.5  /  Alb  4.5  /  TBili  0.2  /  DBili  x   /  AST  19  /  ALT  19  /  AlkPhos  69  07-17      PT/INR - ( 17 Jul 2020 17:48 )   PT: 11.50 sec;   INR: 1.00 ratio         PTT - ( 17 Jul 2020 17:48 )  PTT:35.3 sec                                           CARDIAC MARKERS ( 17 Jul 2020 17:48 )  x     / <0.01 ng/mL / x     / x     / x                                                LIVER FUNCTIONS - ( 17 Jul 2020 17:48 )  Alb: 4.5 g/dL / Pro: 7.5 g/dL / ALK PHOS: 69 U/L / ALT: 19 U/L / AST: 19 U/L / GGT: x                                                                                                                                       X-Rays                                                                                     ECHO not done    MEDICATIONS  (STANDING):  atorvastatin Oral Tab/Cap - Peds 40 milliGRAM(s) Oral daily  cefTRIAXone   IVPB 1000 milliGRAM(s) IV Intermittent every 24 hours  chlorhexidine 4% Liquid 1 Application(s) Topical <User Schedule>  metroNIDAZOLE  IVPB 500 milliGRAM(s) IV Intermittent every 8 hours  pantoprazole  Injectable 40 milliGRAM(s) IV Push daily  sodium chloride 0.9%. 1000 milliLiter(s) (50 mL/Hr) IV Continuous <Continuous>    MEDICATIONS  (PRN):        < from: CT Abdomen and Pelvis w/ IV Cont (07.17.20 @ 19:49) >  1. A radiopaque linear opacity, compatible with a foreign body, measuring approximately 4 cm in length is seen in the region of the prepyloric region of the distal antrum.. This structure (possibly a fish bone, metallic wire or other foreign body) extends extraluminally toward the region of the sonia hepatis. The foreign body appears to be adjacent to the liver without puncturing the liver.     2. There is a 2 cm solid-appearing lesion in the left lobe of liver. Nonemergent MRI of the liver is recommended for further evaluation of this lesion. (The renal parenchyma may be evaluated at the same time.)      < end of copied text >

## 2020-07-18 NOTE — H&P ADULT - NSHPSOCIALHISTORY_GEN_ALL_CORE
Lives at home.   Daily smoker quitted 5-6 years ago, now smoking every now and then.   Alcohol use 2-3x per week (hard liquid about 1/4 of a bottle)   Denies illicit drug use.

## 2020-07-18 NOTE — CONSULT NOTE ADULT - CONSULT REASON
Epigastric pain
Foreign body ingestion
abdominal pain-r/o foreign body
Foreign body ingestion, GI recommends having surgery onboard

## 2020-07-18 NOTE — CHART NOTE - NSCHARTNOTEFT_GEN_A_CORE
PACU ANESTHESIA ADMISSION NOTE      Procedure: EGD  Post op diagnosis:      ____  Intubated  TV:______       Rate: ______      FiO2: ______    ___X_  Patent Airway    ___X_  Full return of protective reflexes    ____X  Full recovery from anesthesia / back to baseline status    Vitals:  T: 98.1F  HR: 89  BP: 152/56  RR: 16  SpO2: 96%     Mental Status:  _X___ Awake   _____ Alert   _____ Drowsy   _____ Sedated    Nausea/Vomiting:  _X___ NO  ______Yes,   See Post - Op Orders          Pain Scale (0-10):  __0___    Treatment: __X__ None    ____ See Post - Op/PCA Orders    Post - Operative Fluids:   ____ Oral   ___X_ See Post - Op Orders    Plan: Discharge:   ____Home       _____Floor     _____XCritical Care    _____  Other:_________________    Comments: Pt. tolerated procedure well under geta. Transported to ICU, report endorsed to Rn and ICU team.

## 2020-07-18 NOTE — CONSULT NOTE ADULT - SUBJECTIVE AND OBJECTIVE BOX
70 yo M w/ PMH of HTN and HLD presents to the ED complaining of epigastric pain started three days prior. Pt describes the epigastric pain to be dull and intermittent. Nothing makes it better or worsen. Patient reports episode of pain when eating food (including fish) since the 4th of July. He started developing the same epigastric pain he presented with today but quickly went away next day. Pt didn't have any more epigastric pain until today, which prompted him to proceed to [facility] ER. He was found to have a 4cm linear foreign body (suspected fish bone) extending extraluminally toward the region of the sonia hepatis from the gastric antrum.      REVIEW OF SYSTEMS:    Review of Systems:  · CONSTITUTIONAL: no fever and no chills.	  · EYES: no discharge, no irritation, no pain, no redness, and no visual changes.	  · ENMT: Ears: no ear pain and no hearing problems.Nose: no nasal congestion and no nasal drainage.Mouth/Throat: no dysphagia, no hoarseness and no throat pain.Neck: no lumps, no pain, no stiffness and no swollen glands.	  · CARDIOVASCULAR: no chest pain and no edema.	  · RESPIRATORY: no chest pain, no cough, and no shortness of breath.	  · GASTROINTESTINAL: - - -	  · Gastrointestinal [+]: ABDOMINAL PAIN	  · GENITOURINARY: no dysuria, no frequency, and no hematuria.	  · MUSCULOSKELETAL: no back pain, no gout, no musculoskeletal pain, no neck pain, and no weakness.	  · NEURO: no loss of consciousness, no gait abnormality, no headache, no sensory deficits, and no weakness.	  · ENDOCRINE: no diabetes and no thyroid trouble.	    PHYSICAL EXAM:  VSS  · CONSTITUTIONAL: Well appearing, awake, alert, oriented to person, place, time/situation and in no apparent distress.	  · ENMT: Airway patent, Nasal mucosa clear. Mouth with normal mucosa. Throat has no vesicles, no oropharyngeal exudates and uvula is midline.	  · EYES: Clear bilaterally, pupils equal, round and reactive to light.	  · CARDIAC: Normal rate, regular rhythm.  Heart sounds S1, S2.  No murmurs, rubs or gallops.	  · RESPIRATORY: Breath sounds clear and equal bilaterally.	  · GASTROINTESTINAL: Abdomen soft,+ EPIGASTRIC  TENDERNESS no guarding.	  · MUSCULOSKELETAL: Spine appears normal, range of motion is not limited, no muscle or joint tenderness	  · NEUROLOGICAL: Alert and oriented, no focal deficits, no motor or sensory deficits.	  · SKIN: Skin normal color for race, warm, dry and intact. No evidence of rash.

## 2020-07-18 NOTE — CONSULT NOTE ADULT - ASSESSMENT
1. Foreign Body Ingestion:    - CT reviewed with surgery team. Pt currently stable, no abdominal complaints - carries high risk for endoscopic intervention (i.e. extraluminal bleeding if sharp foreign body penetrates extraluminal vasculature). Will arrange procedure in OR setting with adequate surgery and IR on standby in case of severe bleeding. Pt should be transferred to ICU setting for close monitoring, monitor hemodynamic status closely, pRBC transfusion on standy before any intervention to retreive foreign body.  - keep NPO except meds/sips of water, continue IVF.     2. Liver lesion  - MRI recommended for further evaluation.

## 2020-07-18 NOTE — PATIENT PROFILE ADULT - [employer]
Left message on personal answering machine to call the clinic RN.  Thais Gonzalez RN     (4) no limitation

## 2020-07-18 NOTE — H&P ADULT - NSHPLABSRESULTS_GEN_ALL_CORE
LABS:                          12.1   12.06 )-----------( 302      ( 17 Jul 2020 17:48 )             36.9     07-17    140  |  102  |  30<H>  ----------------------------<  97  4.4   |  25  |  1.2    Ca    9.8      17 Jul 2020 17:48  Mg     1.9     07-17    TPro  7.5  /  Alb  4.5  /  TBili  0.2  /  DBili  x   /  AST  19  /  ALT  19  /  AlkPhos  69  07-17            PT/INR - ( 17 Jul 2020 17:48 )   PT: 11.50 sec;   INR: 1.00 ratio         PTT - ( 17 Jul 2020 17:48 )  PTT:35.3 sec  Lactate Trend  07-17 @ 17:48 Lactate:1.4     CARDIAC MARKERS ( 17 Jul 2020 17:48 )  x     / <0.01 ng/mL / x     / x     / x          CAPILLARY BLOOD GLUCOSE      RADIOLOGY:    < from: CT Abdomen and Pelvis w/ IV Cont (07.17.20 @ 19:49) >    IMPRESSION:  1. A radiopaque linear opacity, compatible with a foreign body, measuring approximately 4 cm in length is seen in the region of the prepyloric region of the distal antrum.. This structure (possibly a fish bone, metallic wire or other foreign body) extends extraluminally toward the region of the sonia hepatis. The foreign body appears to be adjacent to the liver without puncturing the liver.   2. There is a 2 cm solid-appearing lesion in the left lobe of liver. Nonemergent MRI of the liver is recommended for further evaluation of this lesion. (The renal parenchyma may be evaluated at the same time.)  < end of copied text >    < from: US Abdomen Limited (07.17.20 @ 18:44) >    IMPRESSION:  1. Trace pericholecystic fluid/inflammation and borderline gallbladder wall thickening, nonspecific. No definite sonographic evidence for acute cholecystitis.   2. Indeterminate hepatic mass. Nonemergent MRI recommended for further evaluation.  < end of copied text >            EKGS:

## 2020-07-18 NOTE — CHART NOTE - NSCHARTNOTEFT_GEN_A_CORE
HPI:  68 yo M w/ PMH of HTN and HLD presents to the ED complaining of epigastric pain started three days prior. Pt describes the epigastric pain to be dull and intermittent. Nothing makes it better or worsen. The story goes back to July 4th when he had a party where he ate a lot of food. He started developing the same epigastric pain he presented with today but quickly went away next day. Pt didn't have any more epigastric pain until today. Pt admits to have occasional chill. Pt denies fever, CP, SOB, palpitation, diarrhea, constipation.     Vital Signs Last 24 Hrs  T(C): 37.4 (17 Jul 2020 21:45), Max: 37.4 (17 Jul 2020 21:45)  T(F): 99.3 (17 Jul 2020 21:45), Max: 99.3 (17 Jul 2020 21:45)  HR: 88 (17 Jul 2020 23:55) (77 - 88)  BP: 148/82 (17 Jul 2020 23:55) (148/82 - 164/93)  BP(mean): --  RR: 18 (17 Jul 2020 23:55) (18 - 19)  SpO2: 97% (17 Jul 2020 23:55) (97% - 99%)    In ED, pt underwent CT AP which shows a foreign body. Surgery consulted and recommended no sx at this time. GI consulted and recommended endoscopy. ICU upgrade w/ surgery by bedside during procedure due to high risk EGD. (18 Jul 2020 00:46)    Pt is admitted for a foreign body in gastric antrum. GI evaluated patient and recommended pt to be transfer to [facility] w/ surgery by bedside during EGD procedure due to high risk.    # Epigastric pain likely secondary to foreign body in distal antrum  - as per signup, GI planned for EGD to retrieve foreign body   - keep active type and screen  - check INR, PTT, CBC and CMP    # Leukocytosis and subjective chills and fever  - start abx w/ ceftriaxone and flagyl to cover for intraabdominal infection  - trend CBC  - obtain blood culture    # 2 cm liver lesion  - discussed with patient, OP MRI    # HTN   - hold lisinopril for now     # HLD   - c/w statin     Diet: NPO for now  GI ppx: Protonix 40 mg qD  DVT ppx: Hold for now   Activity: Increase as tolerated  Code status: Full Code ( X ) / DNR (  ) / DNI (  )  Disposition: transfer to [facility]    Signout given to Dr. Cervantes at 1:51.

## 2020-07-18 NOTE — H&P ADULT - NSHPREVIEWOFSYSTEMS_GEN_ALL_CORE
REVIEW OF SYSTEMS:  CONSTITUTIONAL: Admits to fever. No weakness, fevers.  EYES/ENT: No visual changes;  No vertigo or throat pain   NECK: No pain or stiffness  RESPIRATORY: No cough, wheezing, hemoptysis; No shortness of breath  CARDIOVASCULAR: No chest pain or palpitations  GASTROINTESTINAL: Admits dull epigastric pain. No nausea, vomiting, or hematemesis; No diarrhea or constipation. No melena or hematochezia.  GENITOURINARY: No dysuria, frequency or hematuria  NEUROLOGICAL: No numbness or weakness  SKIN: No itching, rashes

## 2020-07-19 LAB
ALBUMIN SERPL ELPH-MCNC: 4.3 G/DL — SIGNIFICANT CHANGE UP (ref 3.5–5.2)
ALP SERPL-CCNC: 68 U/L — SIGNIFICANT CHANGE UP (ref 30–115)
ALT FLD-CCNC: 16 U/L — SIGNIFICANT CHANGE UP (ref 0–41)
ANION GAP SERPL CALC-SCNC: 16 MMOL/L — HIGH (ref 7–14)
AST SERPL-CCNC: 24 U/L — SIGNIFICANT CHANGE UP (ref 0–41)
BASOPHILS # BLD AUTO: 0.01 K/UL — SIGNIFICANT CHANGE UP (ref 0–0.2)
BASOPHILS NFR BLD AUTO: 0.1 % — SIGNIFICANT CHANGE UP (ref 0–1)
BILIRUB SERPL-MCNC: 0.5 MG/DL — SIGNIFICANT CHANGE UP (ref 0.2–1.2)
BUN SERPL-MCNC: 20 MG/DL — SIGNIFICANT CHANGE UP (ref 10–20)
CALCIUM SERPL-MCNC: 9.3 MG/DL — SIGNIFICANT CHANGE UP (ref 8.5–10.1)
CHLORIDE SERPL-SCNC: 101 MMOL/L — SIGNIFICANT CHANGE UP (ref 98–110)
CO2 SERPL-SCNC: 20 MMOL/L — SIGNIFICANT CHANGE UP (ref 17–32)
CREAT SERPL-MCNC: 1.2 MG/DL — SIGNIFICANT CHANGE UP (ref 0.7–1.5)
EOSINOPHIL # BLD AUTO: 0 K/UL — SIGNIFICANT CHANGE UP (ref 0–0.7)
EOSINOPHIL NFR BLD AUTO: 0 % — SIGNIFICANT CHANGE UP (ref 0–8)
GLUCOSE SERPL-MCNC: 120 MG/DL — HIGH (ref 70–99)
HCT VFR BLD CALC: 36 % — LOW (ref 42–52)
HGB BLD-MCNC: 11.7 G/DL — LOW (ref 14–18)
IMM GRANULOCYTES NFR BLD AUTO: 0.3 % — SIGNIFICANT CHANGE UP (ref 0.1–0.3)
LYMPHOCYTES # BLD AUTO: 0.99 K/UL — LOW (ref 1.2–3.4)
LYMPHOCYTES # BLD AUTO: 10.9 % — LOW (ref 20.5–51.1)
MAGNESIUM SERPL-MCNC: 2.2 MG/DL — SIGNIFICANT CHANGE UP (ref 1.8–2.4)
MCHC RBC-ENTMCNC: 29.2 PG — SIGNIFICANT CHANGE UP (ref 27–31)
MCHC RBC-ENTMCNC: 32.5 G/DL — SIGNIFICANT CHANGE UP (ref 32–37)
MCV RBC AUTO: 89.8 FL — SIGNIFICANT CHANGE UP (ref 80–94)
MONOCYTES # BLD AUTO: 0.32 K/UL — SIGNIFICANT CHANGE UP (ref 0.1–0.6)
MONOCYTES NFR BLD AUTO: 3.5 % — SIGNIFICANT CHANGE UP (ref 1.7–9.3)
NEUTROPHILS # BLD AUTO: 7.72 K/UL — HIGH (ref 1.4–6.5)
NEUTROPHILS NFR BLD AUTO: 85.2 % — HIGH (ref 42.2–75.2)
NRBC # BLD: 0 /100 WBCS — SIGNIFICANT CHANGE UP (ref 0–0)
PHOSPHATE SERPL-MCNC: 3.9 MG/DL — SIGNIFICANT CHANGE UP (ref 2.1–4.9)
PLATELET # BLD AUTO: 307 K/UL — SIGNIFICANT CHANGE UP (ref 130–400)
POTASSIUM SERPL-MCNC: 4.6 MMOL/L — SIGNIFICANT CHANGE UP (ref 3.5–5)
POTASSIUM SERPL-SCNC: 4.6 MMOL/L — SIGNIFICANT CHANGE UP (ref 3.5–5)
PROCALCITONIN SERPL-MCNC: 0.03 NG/ML — SIGNIFICANT CHANGE UP (ref 0.02–0.1)
PROT SERPL-MCNC: 7.2 G/DL — SIGNIFICANT CHANGE UP (ref 6–8)
RBC # BLD: 4.01 M/UL — LOW (ref 4.7–6.1)
RBC # FLD: 12.7 % — SIGNIFICANT CHANGE UP (ref 11.5–14.5)
SODIUM SERPL-SCNC: 137 MMOL/L — SIGNIFICANT CHANGE UP (ref 135–146)
WBC # BLD: 9.07 K/UL — SIGNIFICANT CHANGE UP (ref 4.8–10.8)
WBC # FLD AUTO: 9.07 K/UL — SIGNIFICANT CHANGE UP (ref 4.8–10.8)

## 2020-07-19 PROCEDURE — 99233 SBSQ HOSP IP/OBS HIGH 50: CPT

## 2020-07-19 RX ORDER — SODIUM CHLORIDE 9 MG/ML
1000 INJECTION, SOLUTION INTRAVENOUS
Refills: 0 | Status: DISCONTINUED | OUTPATIENT
Start: 2020-07-19 | End: 2020-07-22

## 2020-07-19 RX ORDER — AMLODIPINE BESYLATE 2.5 MG/1
5 TABLET ORAL DAILY
Refills: 0 | Status: DISCONTINUED | OUTPATIENT
Start: 2020-07-19 | End: 2020-07-22

## 2020-07-19 RX ADMIN — AMLODIPINE BESYLATE 5 MILLIGRAM(S): 2.5 TABLET ORAL at 12:47

## 2020-07-19 RX ADMIN — POLYETHYLENE GLYCOL 3350 17 GRAM(S): 17 POWDER, FOR SOLUTION ORAL at 06:50

## 2020-07-19 RX ADMIN — PANTOPRAZOLE SODIUM 40 MILLIGRAM(S): 20 TABLET, DELAYED RELEASE ORAL at 06:36

## 2020-07-19 RX ADMIN — SENNA PLUS 2 TABLET(S): 8.6 TABLET ORAL at 21:32

## 2020-07-19 NOTE — PROGRESS NOTE ADULT - SUBJECTIVE AND OBJECTIVE BOX
Progress Note: General Surgery  Patient: YUMIKO GALINDO , 69y (1950)Male   MRN: [id number]  Location: [place] [address] A  Visit: 07-17-20 Inpatient  Date: 07-19-20 @ 05:38    Procedure/Diagnosis: Foreign body ingestion    Events/ 24h: No acute events overnight, Patient underwent EGD but was unsuccessful. Foreign body was embedded within the mucosa of the stomach so a EUS ultrasound will be planned for Monday by [facility] Pain controlled.    Vitals: T(F): 97.9 (07-19-20 @ 04:00), Max: 98.8 (07-18-20 @ 13:06)  HR: 68 (07-19-20 @ 05:00)  BP: 148/78 (07-19-20 @ 05:00) (116/62 - 193/93)  RR: 20 (07-19-20 @ 05:00)  SpO2: 97% (07-19-20 @ 05:00)    In:   07-17-20 @ 07:01  -  07-18-20 @ 07:00  --------------------------------------------------------  IN: 350 mL    07-18-20 @ 07:01  -  07-19-20 @ 05:38  --------------------------------------------------------  IN: 350 mL      Out:   07-17-20 @ 07:01  -  07-18-20 @ 07:00  --------------------------------------------------------  OUT:  Total OUT: 0 mL      07-18-20 @ 07:01  -  07-19-20 @ 05:38  --------------------------------------------------------  OUT:  Total OUT: 0 mL        Net:   07-17-20 @ 07:01  -  07-18-20 @ 07:00  --------------------------------------------------------  NET: 350 mL    07-18-20 @ 07:01  -  07-19-20 @ 05:38  --------------------------------------------------------  NET: 350 mL        Diet: Diet, Clear Liquid (07-18-20 @ 19:26)    IV Fluids:     Physical Examination:  General Appearance: NAD  HEENT: EOMI, sclera non-icteric.  Heart: RRR   Lungs: CTABL.   Abdomen:  Soft, nontender, nondistended.   MSK/Extremities: Warm & well-perfused.   Skin: Warm, dry. No jaundice.       Medications: [Standing]  chlorhexidine 4% Liquid 1 Application(s) Topical daily  pantoprazole    Tablet 40 milliGRAM(s) Oral before breakfast  senna 2 Tablet(s) Oral at bedtime    DVT Prophylaxis:   GI Prophylaxis: pantoprazole    Tablet 40 milliGRAM(s) Oral before breakfast    Antibiotics:   Anticoagulation:   Medications:[PRN]  polyethylene glycol 3350 17 Gram(s) Oral daily PRN      Labs:                        12.7   12.42 )-----------( 301      ( 18 Jul 2020 16:57 )             38.6     07-18    137  |  100  |  18  ----------------------------<  112<H>  4.9   |  22  |  1.3    Ca    9.7      18 Jul 2020 16:57  Mg     1.9     07-17    TPro  7.8  /  Alb  4.7  /  TBili  0.4  /  DBili  x   /  AST  22  /  ALT  19  /  AlkPhos  77  07-18    LIVER FUNCTIONS - ( 18 Jul 2020 16:57 )  Alb: 4.7 g/dL / Pro: 7.8 g/dL / ALK PHOS: 77 U/L / ALT: 19 U/L / AST: 22 U/L / GGT: x           PT/INR - ( 17 Jul 2020 17:48 )   PT: 11.50 sec;   INR: 1.00 ratio         PTT - ( 17 Jul 2020 17:48 )  PTT:35.3 sec    CARDIAC MARKERS ( 17 Jul 2020 17:48 )  x     / <0.01 ng/mL / x     / x     / x          Urine/Micro:        Imaging:     no new images        Date/Time: 07-19-20 @ 05:38

## 2020-07-19 NOTE — CHART NOTE - NSCHARTNOTEFT_GEN_A_CORE
ICU DOWNGRADE NOTE:    69y Male transferred to floor from ICU    Patient is a 69y old Male who presents with a chief complaint of Epigastric pain     The patient is currently admitted for the primary diagnosis of Foreign body in stomach    The patient was admitted to the unit for 2 Days.    The patient was never intubated, and was never on pressors.    Indwelling vascular catheters:     Urinary Catheter: none    Disposition: floor    Code Status: FULL    ICU COURSE OF EVENTS:  -------------------------------------------------------------------------------------------    70 yo M w/ PMH of HTN and HLD presents to the ED complaining of epigastric pain started three days prior.  Pt denies fever, CP, SOB, palpitation, diarrhea, constipation.     In ED, pt underwent CT AP which shows a foreign body. Surgery consulted and recommended no sx at this time. GI consulted and recommended endoscopy.  EGD:  7/18 showed erythema and edematous area in the antrum most likely corresponding to the foreign body embedded in the antral mucosa. The foreign body could not be seen.  Clear liquid diet and NPO after midnight   PPI once daily   Plan for EUS/EGD with foreign body removal by advanced GI Dr Graff       -------------------------------------------------------------------------------------------    Current workup in progress:    SIGN OUT AT 07-19-20 @ 18:40 GIVEN TO:

## 2020-07-19 NOTE — PROGRESS NOTE ADULT - SUBJECTIVE AND OBJECTIVE BOX
We are following the patient for Foreign body in stomach      GI HPI Today:  Pt feels good. Tolerating clear liquid diet   No overnight events     PAST MEDICAL & SURGICAL HISTORY  Hypertension  High blood cholesterol      ALLERGIES:  No Known Allergies      MEDICATIONS:  MEDICATIONS  (STANDING):  amLODIPine   Tablet 5 milliGRAM(s) Oral daily  chlorhexidine 4% Liquid 1 Application(s) Topical daily  lactated ringers. 1000 milliLiter(s) (75 mL/Hr) IV Continuous <Continuous>  pantoprazole    Tablet 40 milliGRAM(s) Oral before breakfast  senna 2 Tablet(s) Oral at bedtime    MEDICATIONS  (PRN):  polyethylene glycol 3350 17 Gram(s) Oral daily PRN Constipation      REVIEW OF SYSTEMS  General:  No fevers  Eyes:  No reported pain   ENT:  No sore throat   NECK: No stiffness   CV:  No chest pain   Resp:  No shortness of breath  GI:  See HPI  :  No dysuria  Muscle:  No weakness  Neuro:  No tingling  Endocrine:  No polyuria  Heme:  No ecchymosis        VITALS:   T(F): 97.1 (07-19 @ 07:44), Max: 99.3 (07-17 @ 21:45)  HR: 86 (07-19 @ 07:44) (60 - 92)  BP: 167/89 (07-19 @ 07:44) (116/62 - 193/93)  BP(mean): 108 (07-19 @ 07:44) (82 - 146)  RR: 31 (07-19 @ 07:44) (14 - 44)  SpO2: 98% (07-19 @ 07:58) (90% - 99%)        PHYSICAL EXAM:  GENERAL:  Appears in no distress  HEENT:  Conjunctivae Anicteric   CHEST:  Full & symmetric excursion  HEART:  NS1, S2,   ABDOMEN:  Soft, non-tender, non-distended  EXTEREMITIES:  no edema  SKIN:  No rash  NEURO:  Alert         Blood Work :                        11.7   9.07  )-----------( 307      ( 19 Jul 2020 04:30 )             36.0     PT/INR - ( 17 Jul 2020 17:48 )  INR: 1.00          PTT - ( 17 Jul 2020 17:48 )  PTT:35.3   07-18    137  |  100  |  18  ----------------------------<  112<H>  4.9   |  22  |  1.3    Ca    9.7      18 Jul 2020 16:57  Mg     1.9     07-17      CBC -  ( 19 Jul 2020 04:30 )  Hemoglobin : 11.7    CBC -  ( 18 Jul 2020 16:57 )  Hemoglobin : 12.7    CBC -  ( 17 Jul 2020 17:48 )  Hemoglobin : 12.1      LIVER FUNCTIONS - ( 18 Jul 2020 16:57 )  Alb: 4.7 [3.5 - 5.2] / Pro: 7.8 [6.0 - 8.0] / ALK PHOS: 77 [30 - 115] / ALT: 19 [0 - 41] / AST: 22 [0 - 41] / GGT: x     LIVER FUNCTIONS - ( 17 Jul 2020 17:48 )  Alb: 4.5 [3.5 - 5.2] / Pro: 7.5 [6.0 - 8.0] / ALK PHOS: 69 [30 - 115] / ALT: 19 [0 - 41] / AST: 19 [0 - 41] / GGT: x

## 2020-07-19 NOTE — PROGRESS NOTE ADULT - SUBJECTIVE AND OBJECTIVE BOX
OVERNIGHT EVENTS: events noted, s/p EGD, no foreign body seen for EGD/ EUS tomorrow, no abd pain, clear liquid    Vital Signs Last 24 Hrs  T(C): 36.6 (19 Jul 2020 04:00), Max: 37.1 (18 Jul 2020 13:06)  T(F): 97.9 (19 Jul 2020 04:00), Max: 98.8 (18 Jul 2020 13:06)  HR: 70 (19 Jul 2020 06:00) (60 - 92)  BP: 155/76 (19 Jul 2020 06:00) (116/62 - 193/93)  BP(mean): 113 (19 Jul 2020 06:00) (82 - 146)  RR: 24 (19 Jul 2020 06:00) (15 - 44)  SpO2: 95% (19 Jul 2020 06:00) (90% - 99%)    PHYSICAL EXAMINATION:    GENERAL: The patient is awake and alert in no apparent distress.     HEENT: Head is normocephalic and atraumatic. Extraocular muscles are intact. Mucous membranes are moist.    NECK: Supple.    LUNGS: Clear to auscultation without wheezing, rales or rhonchi; respirations unlabored    HEART: Regular rate and rhythm without murmur.    ABDOMEN: Soft, nontender, and nondistended.      EXTREMITIES: Without any cyanosis, clubbing, rash, lesions or edema.    NEUROLOGIC: Grossly intact.    SKIN: No ulceration or induration present.      LABS:                        12.7   12.42 )-----------( 301      ( 18 Jul 2020 16:57 )             38.6     07-18    137  |  100  |  18  ----------------------------<  112<H>  4.9   |  22  |  1.3    Ca    9.7      18 Jul 2020 16:57  Mg     1.9     07-17    TPro  7.8  /  Alb  4.7  /  TBili  0.4  /  DBili  x   /  AST  22  /  ALT  19  /  AlkPhos  77  07-18    PT/INR - ( 17 Jul 2020 17:48 )   PT: 11.50 sec;   INR: 1.00 ratio         PTT - ( 17 Jul 2020 17:48 )  PTT:35.3 sec      CARDIAC MARKERS ( 17 Jul 2020 17:48 )  x     / <0.01 ng/mL / x     / x     / x                Procalcitonin, Serum: 0.03 ng/mL (07-18-20 @ 10:39)        07-18-20 @ 07:01  -  07-19-20 @ 07:00  --------------------------------------------------------  IN: 350 mL / OUT: 0 mL / NET: 350 mL        MICROBIOLOGY:      MEDICATIONS  (STANDING):  chlorhexidine 4% Liquid 1 Application(s) Topical daily  pantoprazole    Tablet 40 milliGRAM(s) Oral before breakfast  senna 2 Tablet(s) Oral at bedtime    MEDICATIONS  (PRN):  polyethylene glycol 3350 17 Gram(s) Oral daily PRN Constipation      RADIOLOGY & ADDITIONAL STUDIES:

## 2020-07-19 NOTE — PROGRESS NOTE ADULT - ASSESSMENT
68 yo M w/ PMH of HTN and HLD presents to the ED complaining of epigastric pain started three days prior. Pt describes the epigastric pain to be dull and intermittent. Nothing makes it better or worsen. Patient reports episode of pain when eating food (including fish) since the 4th of July. He started developing the same epigastric pain he presented with today but quickly went away next day. Pt didn't have any more epigastric pain until today, which prompted him to proceed to [facility] ER. He was found to have a 4cm linear foreign body (suspected fish bone) extending extraluminally toward the region of the sonia hepatis from the gastric antrum.      # Foreign body in the stomach   SP EGD on 7/18 showed eruthema and edematous area in the antrum most likely corresponding to the foreign body embedded in the antral mucosa. The foreign body could not be seen   Rec:   Clear liquid diet and NPO after midnight   PPI once daily   Plan for EUS/EGD with foreign body removal by advanced GI Dr Graff   Patient is stable and no need for ICU monitoring at this time

## 2020-07-19 NOTE — PROGRESS NOTE ADULT - ASSESSMENT
Assessment:  69y Male patient admitted S/P Endoscopy and attempt for body from the stomach mucosa. Plan for EUS on Monday for more accuracy in retrieving the foreign object.      Plan:    DVT/GI ppx  OOBAT  EUS planned by GI for monday 7/20/20  Pain control

## 2020-07-19 NOTE — PROGRESS NOTE ADULT - ASSESSMENT
IMPRESSION:    Epigastric pain - foreign body ? / bone in the prepyloric region of the distal antrum s.p EGD for repeat EGD/ EUS  Elevated White COUNT ; No fever / FEELS WELL      PLAN:    CNS: Avoid CNS depressants.      HEENT: Oral care    PULMONARY:  HOB @ 45 degrees. Aspiration Precautions .     CARDIOVASCULAR: c/w ivf, restart bpmeds    GI: GI prophylaxis.  NPO after midnight    RENAL:  Follow up lytes.  Correct as needed    INFECTIOUS DISEASE: Follow up cultures.    HEMATOLOGICAL:  F/U Labs today . DVT prophylaxis.    ENDOCRINE:  Follow up FS.  Insulin protocol if needed.    MUSCULOSKELETAL: Bedrest , OOB to chair    Smoking cessation advised    Benitez: No benitez  Lines: Peripheral IV   Code status: Full code  transfer to floor

## 2020-07-20 ENCOUNTER — RESULT REVIEW (OUTPATIENT)
Age: 70
End: 2020-07-20

## 2020-07-20 ENCOUNTER — TRANSCRIPTION ENCOUNTER (OUTPATIENT)
Age: 70
End: 2020-07-20

## 2020-07-20 LAB
ALBUMIN SERPL ELPH-MCNC: 4.7 G/DL — SIGNIFICANT CHANGE UP (ref 3.5–5.2)
ALP SERPL-CCNC: 70 U/L — SIGNIFICANT CHANGE UP (ref 30–115)
ALT FLD-CCNC: 21 U/L — SIGNIFICANT CHANGE UP (ref 0–41)
ANION GAP SERPL CALC-SCNC: 14 MMOL/L — SIGNIFICANT CHANGE UP (ref 7–14)
AST SERPL-CCNC: 31 U/L — SIGNIFICANT CHANGE UP (ref 0–41)
BASOPHILS # BLD AUTO: 0.06 K/UL — SIGNIFICANT CHANGE UP (ref 0–0.2)
BASOPHILS NFR BLD AUTO: 0.5 % — SIGNIFICANT CHANGE UP (ref 0–1)
BILIRUB SERPL-MCNC: 0.4 MG/DL — SIGNIFICANT CHANGE UP (ref 0.2–1.2)
BUN SERPL-MCNC: 20 MG/DL — SIGNIFICANT CHANGE UP (ref 10–20)
CALCIUM SERPL-MCNC: 10.1 MG/DL — SIGNIFICANT CHANGE UP (ref 8.5–10.1)
CHLORIDE SERPL-SCNC: 103 MMOL/L — SIGNIFICANT CHANGE UP (ref 98–110)
CO2 SERPL-SCNC: 25 MMOL/L — SIGNIFICANT CHANGE UP (ref 17–32)
CREAT SERPL-MCNC: 1.3 MG/DL — SIGNIFICANT CHANGE UP (ref 0.7–1.5)
EOSINOPHIL # BLD AUTO: 0.1 K/UL — SIGNIFICANT CHANGE UP (ref 0–0.7)
EOSINOPHIL NFR BLD AUTO: 0.9 % — SIGNIFICANT CHANGE UP (ref 0–8)
GLUCOSE SERPL-MCNC: 92 MG/DL — SIGNIFICANT CHANGE UP (ref 70–99)
HCT VFR BLD CALC: 35.9 % — LOW (ref 42–52)
HCT VFR BLD CALC: 41 % — LOW (ref 42–52)
HGB BLD-MCNC: 11.8 G/DL — LOW (ref 14–18)
HGB BLD-MCNC: 13.3 G/DL — LOW (ref 14–18)
IMM GRANULOCYTES NFR BLD AUTO: 0.3 % — SIGNIFICANT CHANGE UP (ref 0.1–0.3)
LYMPHOCYTES # BLD AUTO: 2.34 K/UL — SIGNIFICANT CHANGE UP (ref 1.2–3.4)
LYMPHOCYTES # BLD AUTO: 20.3 % — LOW (ref 20.5–51.1)
MCHC RBC-ENTMCNC: 29.4 PG — SIGNIFICANT CHANGE UP (ref 27–31)
MCHC RBC-ENTMCNC: 29.9 PG — SIGNIFICANT CHANGE UP (ref 27–31)
MCHC RBC-ENTMCNC: 32.4 G/DL — SIGNIFICANT CHANGE UP (ref 32–37)
MCHC RBC-ENTMCNC: 32.9 G/DL — SIGNIFICANT CHANGE UP (ref 32–37)
MCV RBC AUTO: 90.5 FL — SIGNIFICANT CHANGE UP (ref 80–94)
MCV RBC AUTO: 90.9 FL — SIGNIFICANT CHANGE UP (ref 80–94)
MONOCYTES # BLD AUTO: 0.68 K/UL — HIGH (ref 0.1–0.6)
MONOCYTES NFR BLD AUTO: 5.9 % — SIGNIFICANT CHANGE UP (ref 1.7–9.3)
NEUTROPHILS # BLD AUTO: 8.3 K/UL — HIGH (ref 1.4–6.5)
NEUTROPHILS NFR BLD AUTO: 72.1 % — SIGNIFICANT CHANGE UP (ref 42.2–75.2)
NRBC # BLD: 0 /100 WBCS — SIGNIFICANT CHANGE UP (ref 0–0)
NRBC # BLD: 0 /100 WBCS — SIGNIFICANT CHANGE UP (ref 0–0)
PLATELET # BLD AUTO: 291 K/UL — SIGNIFICANT CHANGE UP (ref 130–400)
PLATELET # BLD AUTO: 317 K/UL — SIGNIFICANT CHANGE UP (ref 130–400)
POTASSIUM SERPL-MCNC: 4.8 MMOL/L — SIGNIFICANT CHANGE UP (ref 3.5–5)
POTASSIUM SERPL-SCNC: 4.8 MMOL/L — SIGNIFICANT CHANGE UP (ref 3.5–5)
PROT SERPL-MCNC: 8 G/DL — SIGNIFICANT CHANGE UP (ref 6–8)
RBC # BLD: 3.95 M/UL — LOW (ref 4.7–6.1)
RBC # BLD: 4.53 M/UL — LOW (ref 4.7–6.1)
RBC # FLD: 12.9 % — SIGNIFICANT CHANGE UP (ref 11.5–14.5)
RBC # FLD: 13 % — SIGNIFICANT CHANGE UP (ref 11.5–14.5)
SODIUM SERPL-SCNC: 142 MMOL/L — SIGNIFICANT CHANGE UP (ref 135–146)
WBC # BLD: 10.06 K/UL — SIGNIFICANT CHANGE UP (ref 4.8–10.8)
WBC # BLD: 11.52 K/UL — HIGH (ref 4.8–10.8)
WBC # FLD AUTO: 10.06 K/UL — SIGNIFICANT CHANGE UP (ref 4.8–10.8)
WBC # FLD AUTO: 11.52 K/UL — HIGH (ref 4.8–10.8)

## 2020-07-20 PROCEDURE — 99233 SBSQ HOSP IP/OBS HIGH 50: CPT

## 2020-07-20 PROCEDURE — 43239 EGD BIOPSY SINGLE/MULTIPLE: CPT | Mod: XU

## 2020-07-20 PROCEDURE — 74018 RADEX ABDOMEN 1 VIEW: CPT | Mod: 26

## 2020-07-20 PROCEDURE — 43247 EGD REMOVE FOREIGN BODY: CPT | Mod: XU

## 2020-07-20 PROCEDURE — 88300 SURGICAL PATH GROSS: CPT | Mod: 26,59

## 2020-07-20 PROCEDURE — 99231 SBSQ HOSP IP/OBS SF/LOW 25: CPT | Mod: GC

## 2020-07-20 PROCEDURE — 88305 TISSUE EXAM BY PATHOLOGIST: CPT | Mod: 26

## 2020-07-20 PROCEDURE — 43254 EGD ENDO MUCOSAL RESECTION: CPT

## 2020-07-20 RX ORDER — PANTOPRAZOLE SODIUM 20 MG/1
8 TABLET, DELAYED RELEASE ORAL
Qty: 80 | Refills: 0 | Status: DISCONTINUED | OUTPATIENT
Start: 2020-07-20 | End: 2020-07-22

## 2020-07-20 RX ORDER — CEFAZOLIN SODIUM 1 G
1000 VIAL (EA) INJECTION EVERY 8 HOURS
Refills: 0 | Status: DISCONTINUED | OUTPATIENT
Start: 2020-07-20 | End: 2020-07-22

## 2020-07-20 RX ORDER — HETASTARCH 6 G/100ML
500 INJECTION, SOLUTION INTRAVENOUS ONCE
Refills: 0 | Status: DISCONTINUED | OUTPATIENT
Start: 2020-07-20 | End: 2020-07-22

## 2020-07-20 RX ORDER — METRONIDAZOLE 500 MG
500 TABLET ORAL EVERY 8 HOURS
Refills: 0 | Status: DISCONTINUED | OUTPATIENT
Start: 2020-07-20 | End: 2020-07-22

## 2020-07-20 RX ADMIN — Medication 100 MILLIGRAM(S): at 22:10

## 2020-07-20 RX ADMIN — AMLODIPINE BESYLATE 5 MILLIGRAM(S): 2.5 TABLET ORAL at 06:06

## 2020-07-20 RX ADMIN — SODIUM CHLORIDE 75 MILLILITER(S): 9 INJECTION, SOLUTION INTRAVENOUS at 00:29

## 2020-07-20 RX ADMIN — PANTOPRAZOLE SODIUM 40 MILLIGRAM(S): 20 TABLET, DELAYED RELEASE ORAL at 06:06

## 2020-07-20 RX ADMIN — SODIUM CHLORIDE 75 MILLILITER(S): 9 INJECTION, SOLUTION INTRAVENOUS at 17:59

## 2020-07-20 RX ADMIN — PANTOPRAZOLE SODIUM 10 MG/HR: 20 TABLET, DELAYED RELEASE ORAL at 17:58

## 2020-07-20 NOTE — PROGRESS NOTE ADULT - SUBJECTIVE AND OBJECTIVE BOX
YUMIKO GALINDO  69y  Male      Patient is a 69y old  Male who presents with a chief complaint of Epigastric pain. Pt seen and examined. Pt in no acute distress, NPO. Pt denies epigastric pain, n/v, chest pain, shortness of breath.    INTERVAL HPI/OVERNIGHT EVENTS: no events overnight      REVIEW OF SYSTEMS:  CONSTITUTIONAL: No fever, weight loss, or fatigue  EYES: No eye pain, visual disturbances, or discharge  ENMT:  No difficulty hearing, tinnitus, vertigo; No sinus or throat pain  NECK: No pain or stiffness  BREASTS: No pain, masses, or nipple discharge  RESPIRATORY: No cough, wheezing, chills or hemoptysis; No shortness of breath  CARDIOVASCULAR: No chest pain, palpitations, dizziness, or leg swelling  GASTROINTESTINAL: No abdominal or epigastric pain. No nausea, vomiting, or hematemesis; No diarrhea or constipation. No melena or hematochezia.  GENITOURINARY: No dysuria, frequency, hematuria, or incontinence  NEUROLOGICAL: No headaches, memory loss, loss of strength, numbness, or tremors  SKIN: No itching, burning, rashes, or lesions   LYMPH NODES: No enlarged glands  ENDOCRINE: No heat or cold intolerance; No hair loss  MUSCULOSKELETAL: No joint pain or swelling; No muscle, back, or extremity pain  PSYCHIATRIC: No depression, anxiety, mood swings, or difficulty sleeping  HEME/LYMPH: No easy bruising, or bleeding gums  ALLERY AND IMMUNOLOGIC: No hives or eczema  FAMILY HISTORY:    T(C): 36.2 (07-20-20 @ 05:15), Max: 36.3 (07-19-20 @ 17:00)  HR: 80 (07-20-20 @ 05:15) (60 - 80)  BP: 167/92 (07-20-20 @ 05:15) (125/73 - 170/93)  RR: 18 (07-20-20 @ 05:15) (17 - 46)  SpO2: 96% (07-20-20 @ 08:44) (96% - 99%)  Wt(kg): --Vital Signs Last 24 Hrs  T(C): 36.2 (20 Jul 2020 05:15), Max: 36.3 (19 Jul 2020 17:00)  T(F): 97.2 (20 Jul 2020 05:15), Max: 97.4 (19 Jul 2020 17:00)  HR: 80 (20 Jul 2020 05:15) (60 - 80)  BP: 167/92 (20 Jul 2020 05:15) (125/73 - 170/93)  BP(mean): 104 (19 Jul 2020 22:00) (67 - 131)  RR: 18 (20 Jul 2020 05:15) (17 - 46)  SpO2: 96% (20 Jul 2020 08:44) (96% - 99%)  No Known Allergies      PHYSICAL EXAM:  GENERAL: NAD, obese, well-groomed,  HEAD:  Atraumatic, Normocephalic  NECK: Supple, No JVD, Normal thyroid  NERVOUS SYSTEM:  Alert & Oriented X3, Good concentration; Motor Strength 5/5 B/L upper and lower extremities  CHEST/LUNG: Clear to percussion bilaterally; No rales, rhonchi, wheezing, or rubs  HEART: Regular rate and rhythm; No murmurs, rubs, or gallops  ABDOMEN: Soft, Nontender; + Bowel sounds  EXTREMITIES:  2+ Peripheral Pulses, No clubbing, cyanosis, or edema  SKIN: No rashes or lesions      LABS:                        13.3   11.52 )-----------( 317      ( 20 Jul 2020 08:24 )             41.0       RADIOLOGY & ADDITIONAL TESTS:    Imaging Personally Reviewed:  [ ] YES  [ ] NO  amLODIPine   Tablet 5 milliGRAM(s) Oral daily  chlorhexidine 4% Liquid 1 Application(s) Topical daily  lactated ringers. 1000 milliLiter(s) IV Continuous <Continuous>  pantoprazole    Tablet 40 milliGRAM(s) Oral before breakfast  polyethylene glycol 3350 17 Gram(s) Oral daily PRN  senna 2 Tablet(s) Oral at bedtime

## 2020-07-20 NOTE — PROGRESS NOTE ADULT - SUBJECTIVE AND OBJECTIVE BOX
GENERAL SURGERY PROGRESS NOTE     TIARATIA YUMIKO  [address] [place] [address] day :3d  Surgical Attending: Jj Mcdonald    Overnight events:  Patient seen & examined at bedside. In NAD & has no complaints. AFVSS. NPO since midnight    T(F): 96.7 (07-19-20 @ 22:53), Max: 97.9 (07-19-20 @ 04:00)  HR: 64 (07-19-20 @ 22:00) (60 - 86)  BP: 133/76 (07-19-20 @ 22:00) (125/73 - 194/112)  ABP: --  ABP(mean): --  RR: 40 (07-19-20 @ 22:00) (14 - 46)  SpO2: 98% (07-19-20 @ 21:00) (94% - 99%)      07-18-20 @ 07:01  -  07-19-20 @ 07:00  --------------------------------------------------------  IN:    sodium chloride 0.9%: 350 mL  Total IN: 350 mL    OUT:  Total OUT: 0 mL    Total NET: 350 mL      07-19-20 @ 07:01  -  07-20-20 @ 01:07  --------------------------------------------------------  IN:    lactated ringers.: 975 mL  Total IN: 975 mL    OUT:  Total OUT: 0 mL    Total NET: 975 mL        DIET/FLUIDS: lactated ringers. 1000 milliLiter(s) IV Continuous <Continuous>       GI proph:  pantoprazole    Tablet 40 milliGRAM(s) Oral before breakfast    AC/ proph:   ABx:     PHYSICAL EXAM:  General Appearance: NAD  HEENT: EOMI, sclera non-icteric.  Heart: RRR   Lungs: CTABL.   Abdomen:  Soft, nontender, nondistended.   MSK/Extremities: Warm & well-perfused.   Skin: Warm, dry. No jaundice.   LABS  Labs:  CAPILLARY BLOOD GLUCOSE                              11.7   9.07  )-----------( 307      ( 19 Jul 2020 04:30 )             36.0       Auto Neutrophil %: 85.2 % (07-19-20 @ 04:30)  Auto Immature Granulocyte %: 0.3 % (07-19-20 @ 04:30)    07-19    137  |  101  |  20  ----------------------------<  120<H>  4.6   |  20  |  1.2      Calcium, Total Serum: 9.3 mg/dL (07-19-20 @ 04:30)      LFTs:             7.2  | 0.5  | 24       ------------------[68      ( 19 Jul 2020 04:30 )  4.3  | x    | 16          Lipase:x      Amylase:x         Lactate, Blood: 1.4 mmol/L (07-17-20 @ 17:48)      Culture - Blood (collected 18 Jul 2020 01:55)  Source: .Blood Blood  Preliminary Report (19 Jul 2020 18:01):    No growth to date.

## 2020-07-20 NOTE — CHART NOTE - NSCHARTNOTEFT_GEN_A_CORE
Patient S/P Endoscopic extraction of foreign body( Appears to be a piece of a metallic paper clip). Foreign body was extracted utilizing endoscopic myotomy technique, and tunneling under mucosa. Visible vessels were all cauterized and defect made to extract foreign body was closed with clips. Patient tolerated procedure well.   - Cefazolin ordered-as myotomy and tunneling done- to prevent infection  - Flagyl ordered- as myotomy and tunneling done- to prevent infection  - Pantoprazole drip ordered  - Made NPO this evening  - PM CBC ordered  - If Melena occurs or drop in Hgb more than 2 please notify GI fellow on Call  - [facility] will continue to follow the patient

## 2020-07-20 NOTE — PROGRESS NOTE ADULT - ASSESSMENT
68 yo M w/ PMH of HTN and HLD presents to the ED complaining of 3 day hx/o epigastric pain, described as dull and intermittent. Pt admits to eating food (including fish) since the 4th of July and noted occasional pain. He started developing the same epigastric pain he presented with today but quickly went away next day. P      # Foreign body in the stomach   -7/17 CT AP CT Abdomen and Pelvis w/ IV Cont: A radiopaque linear opacity, compatible with a foreign body, measuring approximately 4 cm in -length is seen in the region of the prepyloric region of the distal antrum  -7/18 EGD erythema and edematous area in the antrum although no FB visualized   -NPO after midnight, going for EUS/ EGD with foreign body removal by advanced GI Dr Dajuan manningx also following, recommend CT non contrast and dx lap if egd negative 70 yo M w/ PMH of HTN and HLD presents to the ED complaining of 3 day hx/o epigastric pain, described as dull and intermittent. Pt admits to eating food (including fish) since the 4th of July and noted occasional pain. He started developing the same epigastric pain he presented with today but quickly went away next day. P      # Foreign body in the stomach   -7/17 CT AP CT Abdomen and Pelvis w/ IV Cont: A radiopaque linear opacity, compatible with a foreign body, measuring approximately 4 cm in -length is seen in the region of the prepyloric region of the distal antrum  -7/18 EGD erythema and edematous area in the antrum although no FB visualized   -NPO after midnight, going for EUS/ EGD with foreign body removal by advanced GI Dr Graff   - sx also following, recommend CT non contrast and dx lap if egd negative  - leukocytosis, downtrending 12.4 on admission-->11.5, afebrile    #HTN  - BP: 167/92 (20 Jul 2020 05:15) (133/76 - 170/93)  - on amlodipine 5mg PO, consider increasing dose  - holding lisinopril due to hyperkalemia    #HLD  - c/w statin     #Indeterminate hepatic mass  -Nonemergent MRI recommended for further evaluation.   -Visualized on US        Diet: NPO  GI ppx: Protonix 40 mg qD  DVT ppx: Hold for now   Activity: Increase as tolerated  Code status: Full Code   Disposition: med/surg

## 2020-07-20 NOTE — PROGRESS NOTE ADULT - ASSESSMENT
A/P:  YUMIKO GALINDO is a 69yMale    Plan:   - Plan for EUS/EGD with foreign body removal by advanced GI Dr Graff   - pain control  - encourage patient to take deep breaths and/or use incentive spirometer  - f/u labs and make appropriate repletions if necessary  - discussed with patient

## 2020-07-21 LAB
ANION GAP SERPL CALC-SCNC: 13 MMOL/L — SIGNIFICANT CHANGE UP (ref 7–14)
BLD GP AB SCN SERPL QL: SIGNIFICANT CHANGE UP
BUN SERPL-MCNC: 17 MG/DL — SIGNIFICANT CHANGE UP (ref 10–20)
CALCIUM SERPL-MCNC: 9.3 MG/DL — SIGNIFICANT CHANGE UP (ref 8.5–10.1)
CHLORIDE SERPL-SCNC: 106 MMOL/L — SIGNIFICANT CHANGE UP (ref 98–110)
CO2 SERPL-SCNC: 21 MMOL/L — SIGNIFICANT CHANGE UP (ref 17–32)
CREAT SERPL-MCNC: 1.2 MG/DL — SIGNIFICANT CHANGE UP (ref 0.7–1.5)
GLUCOSE SERPL-MCNC: 93 MG/DL — SIGNIFICANT CHANGE UP (ref 70–99)
HCT VFR BLD CALC: 36.7 % — LOW (ref 42–52)
HGB BLD-MCNC: 12 G/DL — LOW (ref 14–18)
MAGNESIUM SERPL-MCNC: 2.1 MG/DL — SIGNIFICANT CHANGE UP (ref 1.8–2.4)
MCHC RBC-ENTMCNC: 29 PG — SIGNIFICANT CHANGE UP (ref 27–31)
MCHC RBC-ENTMCNC: 32.7 G/DL — SIGNIFICANT CHANGE UP (ref 32–37)
MCV RBC AUTO: 88.6 FL — SIGNIFICANT CHANGE UP (ref 80–94)
NRBC # BLD: 0 /100 WBCS — SIGNIFICANT CHANGE UP (ref 0–0)
PLATELET # BLD AUTO: 279 K/UL — SIGNIFICANT CHANGE UP (ref 130–400)
POTASSIUM SERPL-MCNC: 4.1 MMOL/L — SIGNIFICANT CHANGE UP (ref 3.5–5)
POTASSIUM SERPL-SCNC: 4.1 MMOL/L — SIGNIFICANT CHANGE UP (ref 3.5–5)
RBC # BLD: 4.14 M/UL — LOW (ref 4.7–6.1)
RBC # FLD: 12.9 % — SIGNIFICANT CHANGE UP (ref 11.5–14.5)
SODIUM SERPL-SCNC: 140 MMOL/L — SIGNIFICANT CHANGE UP (ref 135–146)
WBC # BLD: 11.42 K/UL — HIGH (ref 4.8–10.8)
WBC # FLD AUTO: 11.42 K/UL — HIGH (ref 4.8–10.8)

## 2020-07-21 PROCEDURE — 99233 SBSQ HOSP IP/OBS HIGH 50: CPT

## 2020-07-21 PROCEDURE — 99232 SBSQ HOSP IP/OBS MODERATE 35: CPT

## 2020-07-21 PROCEDURE — 74177 CT ABD & PELVIS W/CONTRAST: CPT | Mod: 26

## 2020-07-21 RX ADMIN — PANTOPRAZOLE SODIUM 10 MG/HR: 20 TABLET, DELAYED RELEASE ORAL at 02:44

## 2020-07-21 RX ADMIN — SENNA PLUS 2 TABLET(S): 8.6 TABLET ORAL at 21:16

## 2020-07-21 RX ADMIN — Medication 100 MILLIGRAM(S): at 21:16

## 2020-07-21 RX ADMIN — Medication 100 MILLIGRAM(S): at 21:53

## 2020-07-21 RX ADMIN — SODIUM CHLORIDE 75 MILLILITER(S): 9 INJECTION, SOLUTION INTRAVENOUS at 02:44

## 2020-07-21 RX ADMIN — Medication 100 MILLIGRAM(S): at 06:36

## 2020-07-21 RX ADMIN — AMLODIPINE BESYLATE 5 MILLIGRAM(S): 2.5 TABLET ORAL at 06:36

## 2020-07-21 RX ADMIN — Medication 100 MILLIGRAM(S): at 14:02

## 2020-07-21 RX ADMIN — PANTOPRAZOLE SODIUM 10 MG/HR: 20 TABLET, DELAYED RELEASE ORAL at 21:35

## 2020-07-21 RX ADMIN — Medication 100 MILLIGRAM(S): at 14:07

## 2020-07-21 NOTE — DIETITIAN INITIAL EVALUATION ADULT. - FACTORS AFF FOOD INTAKE
other (specify)/Pt reports that he currently has a very good appetite, which is his baseline pta. Pt states that he has not been able to have a solid meal since admission d/t persistent abdominal pain. Since procedure on 7/20 (see below) pt denies any abdominal pain/nausea. Pt denies any chewing/swallowing issues. Pt states that her understands the progression of diet from clear liquids to solids as tolerated. NKFA/intolerances noted. No food preferences r/t culture/Orthodox. Pt takes fish oil, tumeric, vitamin B12, vitamin D, and MVI supplements pta. Pt reports that he has chronic issues with constipation. He states that he last had a BM this morning (7/21). Discussed nutrition interventions for constipation with pt and he verbalizes understanding. Pt states this he generally follows a heart healthy diet pta-- seems to be Mediterranean style based on reported foods (whole grains, fresh fruits/vegetables, fish, plant based proteins). Pt otherwise declines the need for heart healthy nutrition therapy. Pt denies any recent changes in wt and states that his UBW is ~232 lbs. Current dosing wt is 107 kg/236 lbs and wt ranges from 236-243 lbs throughout LOS per EMR. No physical signs of muscle wasting/fat loss noted upon RD observation.

## 2020-07-21 NOTE — DIETITIAN INITIAL EVALUATION ADULT. - ENERGY NEEDS
kcal: 0412-0147 (MSJ x 1-1.1 AF) obese BMI considered   protein: 86-99 g (1.4-1.6 g/kg IBW) same as above + significant difference between CBW and IBW considered  fluid: 1mL/kcal or per LIP

## 2020-07-21 NOTE — PROGRESS NOTE ADULT - SUBJECTIVE AND OBJECTIVE BOX
YUMIKO GALINDO  69y  Male      Patient is a 69y old  Male who presents with a chief complaint of Epigastric pain.      INTERVAL HPI/OVERNIGHT EVENTS: The patient was seen and examined at bedside.  Feeling okay. Undergoing CT abd today.       ******************************* REVIEW OF SYSTEMS:**********************************************    All other review of systems negative    *********************** VITALS ******************************************    T(F): 97.3 (07-21-20 @ 12:44)  HR: 75 (07-21-20 @ 12:44) (70 - 86)  BP: 140/74 (07-21-20 @ 12:44) (120/62 - 168/77)  RR: 18 (07-21-20 @ 12:44) (17 - 19)  SpO2: 98% (07-21-20 @ 07:45) (92% - 98%)    07-20-20 @ 07:01  -  07-21-20 @ 07:00  --------------------------------------------------------  IN: 400 mL / OUT: 0 mL / NET: 400 mL            07-20-20 @ 07:01  -  07-21-20 @ 07:00  --------------------------------------------------------  IN: 400 mL / OUT: 0 mL / NET: 400 mL        ******************************** PHYSICAL EXAM:**************************************************  GENERAL: NAD    PSYCH:  baseline mentation  HEENT:     NERVOUS SYSTEM:  Alert & Oriented X3, MS  5/5 B/L  UE and LE ; Sensory intact    PULMONARY: OLLIE, CTA    CARDIOVASCULAR: S1S2 RRR    GI: Soft, NT, ND; BS present.    EXTREMITIES:  2+ Peripheral Pulses, No clubbing, cyanosis, or edema    LYMPH: No lymphadenopathy noted    SKIN: No rashes or lesions      **************************** LABS *******************************************************                          12.0   11.42 )-----------( 279      ( 21 Jul 2020 07:38 )             36.7     07-21    140  |  106  |  17  ----------------------------<  93  4.1   |  21  |  1.2    Ca    9.3      21 Jul 2020 07:38  Mg     2.1     07-21    TPro  8.0  /  Alb  4.7  /  TBili  0.4  /  DBili  x   /  AST  31  /  ALT  21  /  AlkPhos  70  07-20          Lactate Trend  07-17 @ 17:48 Lactate:1.4         CAPILLARY BLOOD GLUCOSE              **************************Active Medications *******************************************  No Known Allergies      amLODIPine   Tablet 5 milliGRAM(s) Oral daily  ceFAZolin   IVPB 1000 milliGRAM(s) IV Intermittent every 8 hours  chlorhexidine 4% Liquid 1 Application(s) Topical daily  hetastarch  6% 500 milliLiter(s) IV Intermittent once  lactated ringers. 1000 milliLiter(s) IV Continuous <Continuous>  metroNIDAZOLE  IVPB 500 milliGRAM(s) IV Intermittent every 8 hours  pantoprazole Infusion 8 mG/Hr IV Continuous <Continuous>  polyethylene glycol 3350 17 Gram(s) Oral daily PRN  senna 2 Tablet(s) Oral at bedtime      ***************************************************  RADIOLOGY & ADDITIONAL TESTS:    Imaging Personally Reviewed:  [ ] YES  [ ] NO    HEALTH ISSUES - PROBLEM Dx:

## 2020-07-21 NOTE — DIETITIAN INITIAL EVALUATION ADULT. - ADD RECOMMEND
advance diet as medically feasible (goal is DASH/TLC), encourage po intake, provide assistance with meals PRN

## 2020-07-21 NOTE — PROGRESS NOTE ADULT - SUBJECTIVE AND OBJECTIVE BOX
Progress Note: General Surgery  Patient: YUMIKO GALINDO , 69y (1950)Male   MRN: [id number]  Location: [address]  Visit: 07-17-20 Inpatient  Date: 07-21-20 @ 04:55    Procedure/Diagnosis: s/p foreign body removal from gastric mucosa via endoscopic myotomy, tunneling under mucosa.    Events/ 24h: No acute events overnight. Pain controlled.    Vitals: T(F): 98.1 (07-21-20 @ 04:46), Max: 98.1 (07-21-20 @ 04:46)  HR: 70 (07-21-20 @ 04:46)  BP: 120/62 (07-21-20 @ 04:46) (120/62 - 168/77)  RR: 18 (07-21-20 @ 04:46)  SpO2: 96% (07-20-20 @ 19:54)    In:   07-19-20 @ 07:01  -  07-20-20 @ 07:00  --------------------------------------------------------  IN: 975 mL    07-20-20 @ 07:01  -  07-21-20 @ 04:55  --------------------------------------------------------  IN: 400 mL      Out:   07-19-20 @ 07:01  -  07-20-20 @ 07:00  --------------------------------------------------------  OUT:  Total OUT: 0 mL      07-20-20 @ 07:01  -  07-21-20 @ 04:55  --------------------------------------------------------  OUT:  Total OUT: 0 mL        Net:   07-19-20 @ 07:01  -  07-20-20 @ 07:00  --------------------------------------------------------  NET: 975 mL    07-20-20 @ 07:01  -  07-21-20 @ 04:55  --------------------------------------------------------  NET: 400 mL        Diet: Diet, NPO after Midnight:      NPO Start Date: 19-Jul-2020,   NPO Start Time: 23:59 (07-19-20 @ 07:31)  Diet, NPO after Midnight:      NPO Start Date: 19-Jul-2020,   NPO Start Time: 23:59 (07-19-20 @ 07:44)  Diet, NPO after Midnight:      NPO Start Date: 19-Jul-2020,   NPO Start Time: 23:59 (07-19-20 @ 09:30)    IV Fluids: hetastarch  6% 500 milliLiter(s) IV Intermittent once  lactated ringers. 1000 milliLiter(s) (75 mL/Hr) IV Continuous <Continuous>      Physical Examination:  General Appearance: NAD  HEENT: EOMI, sclera non-icteric.  Abdomen:  Soft, nontender, nondistended.   MSK/Extremities: Warm & well-perfused.   Skin: Warm, dry. No jaundice.       Medications: [Standing]  amLODIPine   Tablet 5 milliGRAM(s) Oral daily  ceFAZolin   IVPB 1000 milliGRAM(s) IV Intermittent every 8 hours  chlorhexidine 4% Liquid 1 Application(s) Topical daily  hetastarch  6% 500 milliLiter(s) IV Intermittent once  lactated ringers. 1000 milliLiter(s) (75 mL/Hr) IV Continuous <Continuous>  metroNIDAZOLE  IVPB 500 milliGRAM(s) IV Intermittent every 8 hours  pantoprazole Infusion 8 mG/Hr (10 mL/Hr) IV Continuous <Continuous>  senna 2 Tablet(s) Oral at bedtime    DVT Prophylaxis:   GI Prophylaxis: pantoprazole Infusion 8 mG/Hr IV Continuous <Continuous>    Antibiotics: ceFAZolin   IVPB 1000 milliGRAM(s) IV Intermittent every 8 hours  metroNIDAZOLE  IVPB 500 milliGRAM(s) IV Intermittent every 8 hours    Anticoagulation:   Medications:[PRN]  polyethylene glycol 3350 17 Gram(s) Oral daily PRN      Labs:                        11.8   10.06 )-----------( 291      ( 20 Jul 2020 21:34 )             35.9     07-20    142  |  103  |  20  ----------------------------<  92  4.8   |  25  |  1.3    Ca    10.1      20 Jul 2020 08:24    TPro  8.0  /  Alb  4.7  /  TBili  0.4  /  DBili  x   /  AST  31  /  ALT  21  /  AlkPhos  70  07-20    LIVER FUNCTIONS - ( 20 Jul 2020 08:24 )  Alb: 4.7 g/dL / Pro: 8.0 g/dL / ALK PHOS: 70 U/L / ALT: 21 U/L / AST: 31 U/L / GGT: x                 Assessment:  69y Male patient admitted S/P  foreign body removal from gastric mucosa via endoscopic myotomy, tunneling under mucosa.  Patient seen & examined at bedside. In NAD & has no complaints. Made NPO by GI.    Plan:  - Continue Pain control  - Continue antibiotics  - Advance diet as tolerated  - OOB as tolerated  - Continue Incentive Spirometry  - f/u vitals  - f/u labs & replete as necessary  - DVT PPX  - GI PPX  - EUS/EGD was successful, patient not in need of surgical intervention at this time.    Date/Time: 07-21-20 @ 04:55

## 2020-07-21 NOTE — PROGRESS NOTE ADULT - ASSESSMENT
70 yo M w/ PMH of HTN and HLD presents to the ED complaining of 3 day hx/o epigastric pain, described as dull and intermittent. Pt admits to eating food (including fish) since the 4th of July and noted occasional pain. He started developing the same epigastric pain he presented with today but quickly went away next day.     # Foreign body in the stomach   -7/17 CT AP CT Abdomen and Pelvis w/ IV Cont: A radiopaque linear opacity, compatible with a foreign body, measuring approximately 4 cm in -length is seen in the region of the prepyloric region of the distal antrum  -7/18 EGD erythema and edematous area in the antrum although no FB visualized   - s/p EUS/ EGD with foreign body removal by advanced GI Dr Graff   - hgb stable 11.8-->12.0, active type and screen  - on IV protonix, clear liquid diet  - CT A/P to ensure FB is removed  - Continue IV Cefazolin and Flagyl for next 24 hours    #HTN  - BP: 167/92 (20 Jul 2020 05:15) (133/76 - 170/93)  - on amlodipine 5mg PO, consider increasing dose  - holding lisinopril due to hyperkalemia    #HLD  - c/w statin     #Indeterminate hepatic mass  -Nonemergent MRI recommended for further evaluation.   -Visualized on US    Diet: NPO  GI ppx: Protonix 40 mg qD  DVT ppx: Hold for now   Activity: Increase as tolerated  Code status: Full Code   Disposition: med/surg

## 2020-07-21 NOTE — PROGRESS NOTE ADULT - ASSESSMENT
Patient is a 68 y/o gentleman with PMHx of HTN and HLD presents to the ED complaining of epigastric pain. Patient was found to have a foreign body and EGD done emergently over this past weekend could not visualized foreign body. Imaging reviewed with radiology and concern existed that foreign body was starting to embed into the Liver. Patient had EGD with myotomy and sub mucosal tunneling with foreign body extraction, coagulation of vessels, and Endoscopic closure. Patient feels well today, had one small formed stool ( no blood or black material). He has no abdominal pain, nausea, fevers, or back pain. Patient post procedure started on a Pantoprazole drip which is to be continue for 24 hours. Patient also started on Cefazolin and Metronidazole in which he needs IV  dosing for 24 hours before we switch to a PO regimen. Clear liquid diet started. Hold anti coagulation and can encourage ambulation and pneumatic compression of LE for DVT PPX.  Monitor for signs and symptoms of bleeding ( Melena would be concerning for Bleed from site of intervention). Discussed with advanced attending and while Fluoroscopy noted no retained foreign body post extraction he requested CT to insure no pieces remain. Likely anticipate discharge tomorrow if all remains well.     Foreign Body/ Post Endoscopic extraction  - PPI drip for 24 hours than will need Pantoprazole 40mg twice daily for 14 days  - Clear diet for now  - ETOH avoidance for one week- to insure Gastric site healing and will also be discharged of flagyl which interacts with ETOH- He was made aware of this    - Continue IV Cefazolin and Flagyl for next 24 hours  - CT of the Abdomen to insure no retained foreign body post extraction  - No need for surgical intervention , but appreciate that they were on board    - Will follow

## 2020-07-21 NOTE — PROGRESS NOTE ADULT - SUBJECTIVE AND OBJECTIVE BOX
YUMIKO GALINDO  69y  Male      Patient is a 69y old  Male who presents with a chief complaint of Epigastric pain.     Pt seen and examined. Pt in no acute distress, NPO. Pt denies abdominal pain, n/v, hematochezia/melena, chest pain, shortness of breath.    INTERVAL HPI/OVERNIGHT EVENTS: no events overnight      ICU Vital Signs Last 24 Hrs  T(C): 36.3 (21 Jul 2020 12:44), Max: 36.7 (21 Jul 2020 04:46)  T(F): 97.3 (21 Jul 2020 12:44), Max: 98.1 (21 Jul 2020 04:46)  HR: 75 (21 Jul 2020 12:44) (70 - 86)  BP: 140/74 (21 Jul 2020 12:44) (120/62 - 168/77)  RR: 18 (21 Jul 2020 12:44) (17 - 19)  SpO2: 98% (21 Jul 2020 07:45) (92% - 98%)      PHYSICAL EXAM:  GENERAL: NAD, obese, well-groomed,  HEAD:  Atraumatic, Normocephalic  NECK: Supple, No JVD, Normal thyroid  NERVOUS SYSTEM:  Alert & Oriented X3, Good concentration; Motor Strength 5/5 B/L upper and lower extremities  CHEST/LUNG: Clear to percussion bilaterally; No rales, rhonchi, wheezing, or rubs  HEART: Regular rate and rhythm; No murmurs, rubs, or gallops  ABDOMEN: Soft, Nontender; + Bowel sounds  EXTREMITIES:  2+ Peripheral Pulses, No clubbing, cyanosis, or edema  SKIN: No rashes or lesions                            12.0   11.42 )-----------( 279      ( 21 Jul 2020 07:38 )             36.7     RADIOLOGY & ADDITIONAL TESTS:    Imaging Personally Reviewed:  [ ] YES  [ ] NO  amLODIPine   Tablet 5 milliGRAM(s) Oral daily  chlorhexidine 4% Liquid 1 Application(s) Topical daily  lactated ringers. 1000 milliLiter(s) IV Continuous <Continuous>  pantoprazole    Tablet 40 milliGRAM(s) Oral before breakfast  polyethylene glycol 3350 17 Gram(s) Oral daily PRN  senna 2 Tablet(s) Oral at bedtime

## 2020-07-21 NOTE — DIETITIAN INITIAL EVALUATION ADULT. - OTHER INFO
Pt admitted d/t foreign body in stomach. Pt is sp EUS/ERCP on 7/20 with foreign body removal per advanced GI with endoscopic closure. Plan for CT abdomen to ensure no retained foreign body post extraction.

## 2020-07-21 NOTE — PROGRESS NOTE ADULT - SUBJECTIVE AND OBJECTIVE BOX
Patient is a 68 y/o gentleman with PMHx of HTN and HLD presents to the ED complaining of epigastric pain. Patient was found to have a foreign body and EGD done emergently over this past weekend could not visualized foreign body. Imaging reviewed with radiology and concern existed that foreign body was starting to embed into the Liver. Patient had EGD with myotomy and sub mucosal tunneling with foreign body extraction, coagulation of vessels, and Endoscopic closure. Patient feels well today, had one small formed stool ( no blood or black material). He has no abdominal pain, nausea, fevers, or back pain.        PAST MEDICAL & SURGICAL HISTORY:  Hypertension  High blood cholesterol    Social History  Denies Current Tobacco use  Admits Current ETOH use- social but weekly   Denies Current Illicit Drug use       MEDICATIONS  (STANDING):  amLODIPine   Tablet 5 milliGRAM(s) Oral daily  ceFAZolin   IVPB 1000 milliGRAM(s) IV Intermittent every 8 hours  chlorhexidine 4% Liquid 1 Application(s) Topical daily  hetastarch  6% 500 milliLiter(s) IV Intermittent once  lactated ringers. 1000 milliLiter(s) (75 mL/Hr) IV Continuous <Continuous>  metroNIDAZOLE  IVPB 500 milliGRAM(s) IV Intermittent every 8 hours  pantoprazole Infusion 8 mG/Hr (10 mL/Hr) IV Continuous <Continuous>  senna 2 Tablet(s) Oral at bedtime    MEDICATIONS  (PRN):  polyethylene glycol 3350 17 Gram(s) Oral daily PRN Constipation      Allergies  No Known Allergies      Review of Systems  General:  Denies Fatigue, Denies Fever, Denies Weakness ,Denies Weight Loss   HEENT: Denies Trouble Swallowing ,Denies  Sore Throat , Denies Change in hearing/vision/speech ,Denies Dizziness    Cardio: Denies  Chest Pain , Palpitations    Respiratory: Denies worsening of SOB, Denies Cough  Abdomen: See detailed HPI  Neuro: Denies Headache Denies Dizziness, Denies Paresthesias  MSK: Denies pain in Bones/Joints/Muscles   Psych: Patient denies depression, denies suicidal or homicidal ideations  Integ: Patient Denies rash, or new skin lesions       Vital Signs  T(F): 98.1 (21 Jul 2020 04:46), Max: 98.1 (21 Jul 2020 04:46)  HR: 70 (21 Jul 2020 04:46) (70 - 86)  BP: 120/62 (21 Jul 2020 04:46) (120/62 - 168/77)  RR: 18 (21 Jul 2020 04:46) (17 - 19)  SpO2: 98% (21 Jul 2020 07:45) (92% - 98%)  Physical Exam  Gen: NAD  HEENT: NC/AT  Neck: supple, no crepitus of neck or chest  Cardio: S1/S2  Resp: CTA B/L  Abdomen: Soft, ND/NT  Neuro: AAOx3  Extremities: FROM x 4      LABS:                        12.0   11.42 )-----------( 279      ( 21 Jul 2020 07:38 )             36.7     07-21    140  |  106  |  17  ----------------------------<  93  4.1   |  21  |  1.2    Ca    9.3      21 Jul 2020 07:38  Mg     2.1     07-21    TPro  8.0  /  Alb  4.7  /  TBili  0.4  /  DBili  x   /  AST  31  /  ALT  21  /  AlkPhos  70  07-20

## 2020-07-21 NOTE — PROGRESS NOTE ADULT - ATTENDING COMMENTS
70yo male with PMHx of HLD and HTN admitted for abdominal pain, found to have foreign body penetrating the anterior stomach. EGD attempted yesterday for removal of foreign body but was unsuccessful. GI planning for EUS/EGD by advanced GI tomorrow. At this time, patient is in ICU. He is feeling well otherwise. Denies nausea/vomiting, shortness of breath, or chest pain. Afebrile, HDS, abdomen soft. WBC 9. Recommend NPO for procedure tomorrow. Surgery to follow. Primary care as per ICU.
I agree with above plan and recommendation
Stable and afebrile.  No abdominal pain.  No n/v.  EGD unable to see foreign body.  Abdomen soft, NT, ND.    WC normal.    a/p:  Foreign body, stomach / RUQ.  EUS, EGD today.  If they can not find foreign body, repeat CT with no PO or IV contrast.  dx laparoscopy tomorrow if EGD is negative.
Patient seen and examined independently. Agree with resident note.  Patient presented with on and off pain since july 4th.  Foreign body in stomach-- prepyloric region-- EGD showed erythema and will go gain today for removal by advanced GI-- surgery recommending CT abd and Ex lap if advanced GI unable to retrieve it.  patient is comfortable and in no significant pain in abd today.

## 2020-07-21 NOTE — DIETITIAN INITIAL EVALUATION ADULT. - PHYSICAL APPEARANCE
obese/other (specify)/BMI 39.3 using dosing/lowest wt this admission no edema noted; skin is intact (7/21)

## 2020-07-21 NOTE — PROGRESS NOTE ADULT - ASSESSMENT
68 yo M w/ PMH of HTN and HLD presents to the ED complaining of 3 day hx/o epigastric pain, described as dull and intermittent. Pt admits to eating food (including fish) since the 4th of July and noted occasional pain. He started developing the same epigastric pain he presented with today but quickly went away next day. P      # Foreign body in the stomach   -7/17 CT AP CT Abdomen and Pelvis w/ IV Cont: A radiopaque linear opacity, compatible with a foreign body, measuring approximately 4 cm in -length is seen in the region of the prepyloric region of the distal antrum  -7/18 EGD erythema and edematous area in the antrum although no FB visualized   -s/p  foreign body removal by advanced GI Dr Graff yesterday .  - undergoing repeat CT A/P today.     #HTN  - better controlled today.   - on amlodipine 5mg PO, consider increasing dose if needed.   - holding lisinopril due to hyperkalemia    #HLD  - c/w statin     #Indeterminate hepatic mass  -Nonemergent MRI recommended for further evaluation.   -Visualized on US    GI ppx: Protonix 40 mg qD  DVT ppx: Hold for now   Activity: Increase as tolerated  Code status: Full Code   Disposition: med/surg    #Progress Note Handoff  Pending (specify):  CT a/p // Diet tolerance  Disposition: Home possibly tomorrow.

## 2020-07-22 ENCOUNTER — TRANSCRIPTION ENCOUNTER (OUTPATIENT)
Age: 70
End: 2020-07-22

## 2020-07-22 VITALS
DIASTOLIC BLOOD PRESSURE: 86 MMHG | SYSTOLIC BLOOD PRESSURE: 148 MMHG | RESPIRATION RATE: 20 BRPM | HEART RATE: 68 BPM | TEMPERATURE: 97 F

## 2020-07-22 LAB
ANION GAP SERPL CALC-SCNC: 15 MMOL/L — HIGH (ref 7–14)
BASOPHILS # BLD AUTO: 0.04 K/UL — SIGNIFICANT CHANGE UP (ref 0–0.2)
BASOPHILS NFR BLD AUTO: 0.5 % — SIGNIFICANT CHANGE UP (ref 0–1)
BUN SERPL-MCNC: 12 MG/DL — SIGNIFICANT CHANGE UP (ref 10–20)
CALCIUM SERPL-MCNC: 9.9 MG/DL — SIGNIFICANT CHANGE UP (ref 8.5–10.1)
CHLORIDE SERPL-SCNC: 102 MMOL/L — SIGNIFICANT CHANGE UP (ref 98–110)
CO2 SERPL-SCNC: 22 MMOL/L — SIGNIFICANT CHANGE UP (ref 17–32)
CREAT SERPL-MCNC: 1.2 MG/DL — SIGNIFICANT CHANGE UP (ref 0.7–1.5)
EOSINOPHIL # BLD AUTO: 0.19 K/UL — SIGNIFICANT CHANGE UP (ref 0–0.7)
EOSINOPHIL NFR BLD AUTO: 2.3 % — SIGNIFICANT CHANGE UP (ref 0–8)
GLUCOSE SERPL-MCNC: 94 MG/DL — SIGNIFICANT CHANGE UP (ref 70–99)
HCT VFR BLD CALC: 39.3 % — LOW (ref 42–52)
HGB BLD-MCNC: 13.1 G/DL — LOW (ref 14–18)
IMM GRANULOCYTES NFR BLD AUTO: 0.4 % — HIGH (ref 0.1–0.3)
LYMPHOCYTES # BLD AUTO: 1.53 K/UL — SIGNIFICANT CHANGE UP (ref 1.2–3.4)
LYMPHOCYTES # BLD AUTO: 18.7 % — LOW (ref 20.5–51.1)
MAGNESIUM SERPL-MCNC: 2.1 MG/DL — SIGNIFICANT CHANGE UP (ref 1.8–2.4)
MCHC RBC-ENTMCNC: 30 PG — SIGNIFICANT CHANGE UP (ref 27–31)
MCHC RBC-ENTMCNC: 33.3 G/DL — SIGNIFICANT CHANGE UP (ref 32–37)
MCV RBC AUTO: 90.1 FL — SIGNIFICANT CHANGE UP (ref 80–94)
MONOCYTES # BLD AUTO: 0.6 K/UL — SIGNIFICANT CHANGE UP (ref 0.1–0.6)
MONOCYTES NFR BLD AUTO: 7.3 % — SIGNIFICANT CHANGE UP (ref 1.7–9.3)
NEUTROPHILS # BLD AUTO: 5.79 K/UL — SIGNIFICANT CHANGE UP (ref 1.4–6.5)
NEUTROPHILS NFR BLD AUTO: 70.8 % — SIGNIFICANT CHANGE UP (ref 42.2–75.2)
NRBC # BLD: 0 /100 WBCS — SIGNIFICANT CHANGE UP (ref 0–0)
PLATELET # BLD AUTO: 268 K/UL — SIGNIFICANT CHANGE UP (ref 130–400)
POTASSIUM SERPL-MCNC: 4.4 MMOL/L — SIGNIFICANT CHANGE UP (ref 3.5–5)
POTASSIUM SERPL-SCNC: 4.4 MMOL/L — SIGNIFICANT CHANGE UP (ref 3.5–5)
RBC # BLD: 4.36 M/UL — LOW (ref 4.7–6.1)
RBC # FLD: 12.9 % — SIGNIFICANT CHANGE UP (ref 11.5–14.5)
SODIUM SERPL-SCNC: 139 MMOL/L — SIGNIFICANT CHANGE UP (ref 135–146)
SURGICAL PATHOLOGY STUDY: SIGNIFICANT CHANGE UP
WBC # BLD: 8.18 K/UL — SIGNIFICANT CHANGE UP (ref 4.8–10.8)
WBC # FLD AUTO: 8.18 K/UL — SIGNIFICANT CHANGE UP (ref 4.8–10.8)

## 2020-07-22 PROCEDURE — 99232 SBSQ HOSP IP/OBS MODERATE 35: CPT

## 2020-07-22 PROCEDURE — 99233 SBSQ HOSP IP/OBS HIGH 50: CPT

## 2020-07-22 RX ORDER — ATORVASTATIN CALCIUM 80 MG/1
40 TABLET, FILM COATED ORAL AT BEDTIME
Refills: 0 | Status: DISCONTINUED | OUTPATIENT
Start: 2020-07-22 | End: 2020-07-22

## 2020-07-22 RX ORDER — ATORVASTATIN CALCIUM 80 MG/1
1 TABLET, FILM COATED ORAL
Qty: 0 | Refills: 0 | DISCHARGE

## 2020-07-22 RX ORDER — ATORVASTATIN CALCIUM 80 MG/1
1 TABLET, FILM COATED ORAL
Qty: 0 | Refills: 0 | DISCHARGE
Start: 2020-07-22

## 2020-07-22 RX ORDER — PANTOPRAZOLE SODIUM 20 MG/1
1 TABLET, DELAYED RELEASE ORAL
Qty: 14 | Refills: 0
Start: 2020-07-22 | End: 2020-07-28

## 2020-07-22 RX ORDER — PANTOPRAZOLE SODIUM 20 MG/1
1 TABLET, DELAYED RELEASE ORAL
Qty: 28 | Refills: 0
Start: 2020-07-22 | End: 2020-08-04

## 2020-07-22 RX ADMIN — Medication 100 MILLIGRAM(S): at 13:02

## 2020-07-22 RX ADMIN — AMLODIPINE BESYLATE 5 MILLIGRAM(S): 2.5 TABLET ORAL at 06:19

## 2020-07-22 RX ADMIN — Medication 100 MILLIGRAM(S): at 06:19

## 2020-07-22 RX ADMIN — PANTOPRAZOLE SODIUM 10 MG/HR: 20 TABLET, DELAYED RELEASE ORAL at 12:20

## 2020-07-22 RX ADMIN — Medication 100 MILLIGRAM(S): at 13:01

## 2020-07-22 NOTE — DISCHARGE NOTE PROVIDER - NSDCMRMEDTOKEN_GEN_ALL_CORE_FT
atorvastatin 40 mg oral tablet: 1 tab(s) orally once a day (at bedtime)  lisinopril 10 mg oral tablet: 1 tab(s) orally once a day atorvastatin 40 mg oral tablet: 1 tab(s) orally once a day (at bedtime)  Cipro 500 mg oral tablet: 1 tab(s) orally 2 times a day   Flagyl 500 mg oral tablet: 1 tab(s) orally 3 times a day   lisinopril 10 mg oral tablet: 1 tab(s) orally once a day  Protonix 40 mg oral delayed release tablet: 1 tab(s) orally 2 times a day   Protonix 40 mg oral delayed release tablet: 1 tab(s) orally once a day

## 2020-07-22 NOTE — PROGRESS NOTE ADULT - ASSESSMENT
68 yo M w/ PMH of HTN and HLD presents to the ED complaining of 3 day hx/o epigastric pain, described as dull and intermittent. Pt admits to eating food (including fish) since the 4th of July and noted occasional pain. He started developing the same epigastric pain he presented with today but quickly went away next day.  Patient was found to have a foreign body and EGD done emergently over this past weekend could not visualized foreign body. Imaging reviewed with radiology and concern existed that foreign body was starting to embed into the Liver. Patient had EGD with myotomy and sub mucosal tunneling with foreign body extraction, coagulation of vessels, and Endoscopic closure. Patient continues to feel well. CT scan done to make sure foreign body fully removed.     A/P     # Foreign body in the stomach   - consulted by advanced GI   - PPI Pantoprazole 40mg BID for 14 days  - Can start GI soft- for 1 week  - ETOH avoidance for one week- to insure Gastric site healing   - c/w  flagyl and  Cipro 500mg twice a day for five days  - CT of the Abdomen done without retention of foreign body  - Liver cyst noted- will obtain outpatient MRI  - Follow up via telehealth with Dr Graff in the next 1-2 weeks will be arranged by the office. They have all of his active contact information  - pt is stable for discharge     #HTN  -DASH diet   - on amlodipine 5mg PO     #HLD  - c/w statin     #Indeterminate hepatic mass  - MRI as an outpt   - f/u with surgery after discharge     GI ppx: Protonix 40 mg qD  DVT ppx: Hold for now     Code status: Full Code   Disposition: pt is stable for discharge home today, he was consulted regarding diet, medications compliance, outpt MRI and follow up with Sx and advance GI

## 2020-07-22 NOTE — DISCHARGE NOTE PROVIDER - HOSPITAL COURSE
70 y/o gentleman with PMHx of HTN and HLD presents to the ED complaining of epigastric pain. Patient was found to have a foreign body on CT abd and EGD done emergently over this past weekend could not visualized foreign body. Imaging reviewed with radiology and concern existed that foreign body was starting to embed into the Liver. Patient had EGD with myotomy and sub mucosal tunneling with foreign body extraction, coagulation of vessels, and Endoscopic closure. Patient feels well today, had one small formed stool ( no blood or black material). He has no abdominal pain, nausea, fevers, or back pain. Patient post procedure started on a Pantoprazole drip which is to be continue for 24 hours. Patient also started on Cefazolin and Metronidazole in which he needs IV  dosing for 24 hours before we switch to a PO regimen. Clear liquid diet started. Post extraction CT abd revealed no FB but nodularity in tip of appendix and indeterminate liver lesion 2.2 cm along with lobulated kidneys which requires outpt MRI and surgery follow up. As per GI he will be discharged on PO ciprofloxacin and flagyl for 7 days along with protonix 40mg BID and then once daily. He will follow up with  in 2 weeks. He is medically stable for discharge. 70 y/o gentleman with PMHx of HTN and HLD presents to the ED complaining of epigastric pain. Patient was found to have a foreign body on CT abd and EGD done emergently over this past weekend could not visualized foreign body. Imaging reviewed with radiology and concern existed that foreign body was starting to embed into the Liver. Patient had EGD with myotomy and sub mucosal tunneling with foreign body extraction, coagulation of vessels, and Endoscopic closure. Patient feels well today, had one small formed stool ( no blood or black material). He has no abdominal pain, nausea, fevers, or back pain. Patient post procedure started on a Pantoprazole drip which is to be continue for 24 hours. Patient also started on Cefazolin and Metronidazole in which he needs IV  dosing for 24 hours before we switch to a PO regimen. Clear liquid diet started. Post extraction CT abd revealed no FB but nodularity in tip of appendix and indeterminate liver lesion 2.2 cm along with lobulated kidneys which requires outpt MRI and surgery follow up. As per GI he will be discharged on PO ciprofloxacin 500mg BID and flagyl 500mg TID for 5 days along with protonix 40mg BID for 14 days and then once daily. He will follow up with  in 2 weeks. He is medically stable for discharge.

## 2020-07-22 NOTE — DISCHARGE NOTE NURSING/CASE MANAGEMENT/SOCIAL WORK - PATIENT PORTAL LINK FT
You can access the FollowMyHealth Patient Portal offered by [facility] by registering at the following website: [email or web address]. By joining [email or web address]’s FollowMyHealth portal, you will also be able to view your health information using other applications (apps) compatible with our system.

## 2020-07-22 NOTE — PROGRESS NOTE ADULT - SUBJECTIVE AND OBJECTIVE BOX
Patient is a 70 y/o gentleman with PMHx of HTN and HLD presents to the ED complaining of epigastric pain. Patient was found to have a foreign body and EGD done emergently over this past weekend could not visualized foreign body. Imaging reviewed with radiology and concern existed that foreign body was starting to embed into the Liver. Patient had EGD with myotomy and sub mucosal tunneling with foreign body extraction, coagulation of vessels, and Endoscopic closure. Patient continues to feel well. CT scan done to make sure foreign body fully removed.       PAST MEDICAL & SURGICAL HISTORY:  Hypertension  High blood cholesterol    Social History  Denies Current Tobacco use  Admits Current ETOH use- social but weekly   Denies Current Illicit Drug use       MEDICATIONS  (STANDING):  amLODIPine   Tablet 5 milliGRAM(s) Oral daily  ceFAZolin   IVPB 1000 milliGRAM(s) IV Intermittent every 8 hours  chlorhexidine 4% Liquid 1 Application(s) Topical daily  hetastarch  6% 500 milliLiter(s) IV Intermittent once  lactated ringers. 1000 milliLiter(s) (75 mL/Hr) IV Continuous <Continuous>  metroNIDAZOLE  IVPB 500 milliGRAM(s) IV Intermittent every 8 hours  pantoprazole Infusion 8 mG/Hr (10 mL/Hr) IV Continuous <Continuous>  senna 2 Tablet(s) Oral at bedtime    MEDICATIONS  (PRN):  polyethylene glycol 3350 17 Gram(s) Oral daily PRN Constipation      Allergies  No Known Allergies      Review of Systems  General:  Denies Fatigue, Denies Fever, Denies Weakness ,Denies Weight Loss   HEENT: Denies Trouble Swallowing ,Denies  Sore Throat , Denies Change in hearing/vision/speech ,Denies Dizziness    Cardio: Denies  Chest Pain , Palpitations    Respiratory: Denies worsening of SOB, Denies Cough  Abdomen: See detailed HPI  Neuro: Denies Headache Denies Dizziness, Denies Paresthesias  MSK: Denies pain in Bones/Joints/Muscles   Psych: Patient denies depression, denies suicidal or homicidal ideations  Integ: Patient Denies rash, or new skin lesions       Vital Signs  T(F): 97.2 (22 Jul 2020 13:41), Max: 97.4 (21 Jul 2020 20:41)  HR: 68 (22 Jul 2020 13:41) (62 - 72)  BP: 148/86 (22 Jul 2020 13:41) (148/86 - 175/87)  RR: 20 (22 Jul 2020 13:41) (18 - 20)  SpO2: 97% (21 Jul 2020 16:33) (97% - 97%)  Physical Exam  Gen: NAD  HEENT: NC/AT  Neck: supple, no crepitus of neck or chest  Cardio: S1/S2  Resp: CTA B/L  Abdomen: Soft, ND/NT  Neuro: AAOx3  Extremities: FROM x 4      LABS:                        13.1   8.18  )-----------( 268      ( 22 Jul 2020 12:10 )             39.3     07-22    139  |  102  |  12  ----------------------------<  94  4.4   |  22  |  1.2    Ca    9.9      22 Jul 2020 12:10  Mg     2.1     07-22      CT Abdomen and Pelvis w/ IV Cont 07.21.20   IMPRESSION:    Status post foreign body removal from the stomach with postsurgical clips now identified and postsurgical change. No residual foreign body is identified. There is no evidence of free air    Indeterminate nodularity in the tip of the appendix measuring 1.5 x 1.7 x 1.3 cm. Appendiceal pathology such as neoplasm cannot be excluded. Follow-up is recommended. Consultation with surgery may be of benefit.    Indeterminate liver lesion measuring 2.2 cm. Outpatient MRI is recommended for further evaluation (as mentioned on prior exam, the lobulated nature of the kidneys can be better evaluated at that time).

## 2020-07-22 NOTE — DISCHARGE NOTE PROVIDER - PROVIDER TOKENS
PROVIDER:[TOKEN:[[id number]],FOLLOWUP:[2 weeks]],PROVIDER:[TOKEN:[[id number]],FOLLOWUP:[1 month]]

## 2020-07-22 NOTE — PROGRESS NOTE ADULT - SUBJECTIVE AND OBJECTIVE BOX
70 y/o gentleman with PMHx of HTN and HLD presents to the ED complaining of epigastric pain. Patient was found to have a foreign body and EGD done emergently over this past weekend could not visualized foreign body. Imaging reviewed with radiology and concern existed that foreign body was starting to embed into the Liver. Patient had EGD with myotomy and sub mucosal tunneling with foreign body extraction, coagulation of vessels, and Endoscopic closure. Patient continues to feel well. CT scan done to make sure foreign body fully removed.   Today pt feels great, has no complaints, asking about discharge, denies abdominal pain, nausea, vomiting.     Vital Signs Last 24 Hrs  T(C): 36.2 (22 Jul 2020 13:41), Max: 36.3 (21 Jul 2020 20:41)  T(F): 97.2 (22 Jul 2020 13:41), Max: 97.4 (21 Jul 2020 20:41)  HR: 68 (22 Jul 2020 13:41) (62 - 72)  BP: 148/86 (22 Jul 2020 13:41) (148/86 - 175/87)  BP(mean): --  RR: 20 (22 Jul 2020 13:41) (18 - 20)  SpO2: --     PHYSICAL EXAM:   GENERAL: NAD  NERVOUS SYSTEM:  Alert & Oriented X3, MS  5/5 B/L  UE and LE ; Sensory intact  PULMONARY: CTA b/l   CARDIOVASCULAR: S1S2 RRR  GI/ Abdomen:  Soft, NT, ND; BS present.  EXTREMITIES:  2+ Peripheral Pulses, No clubbing, cyanosis, or edema  LYMPH: No lymphadenopathy noted  SKIN: No rashes or lesions    LABs:                           13.1   8.18  )-----------( 268      ( 22 Jul 2020 12:10 )             39.3   07-22    139  |  102  |  12  ----------------------------<  94  4.4   |  22  |  1.2    Ca    9.9      22 Jul 2020 12:10  Mg     2.1     07-22      Lactate Trend  07-17 @ 17:48 Lactate:1.4     Culture - Blood (07.18.20 @ 01:55)    Specimen Source: .Blood Blood    Culture Results:   No growth to date.    RADIOLOGY:    < from: CT Abdomen and Pelvis w/ IV Cont (07.21.20 @ 16:00) >  IMPRESSION:    Status post foreign body removal from the stomach with postsurgical clips now identified and postsurgical change. No residual foreign body is identified. There is no evidence of free air    Indeterminate nodularity in the tip of the appendix measuring 1.5 x 1.7 x 1.3 cm. Appendiceal pathology such as neoplasm cannot be excluded. Follow-up is recommended. Consultation with surgery may be of benefit.    Indeterminate liver lesion measuring 2.2 cm. Outpatient MRI is recommended for further evaluation (as mentioned on prior exam, the lobulated nature of the kidneys can be better evaluated at that time).    MEDICATIONS  (STANDING):  amLODIPine   Tablet 5 milliGRAM(s) Oral daily  atorvastatin 40 milliGRAM(s) Oral at bedtime  ceFAZolin   IVPB 1000 milliGRAM(s) IV Intermittent every 8 hours  chlorhexidine 4% Liquid 1 Application(s) Topical daily  metroNIDAZOLE  IVPB 500 milliGRAM(s) IV Intermittent every 8 hours  pantoprazole Infusion 8 mG/Hr (10 mL/Hr) IV Continuous <Continuous>  senna 2 Tablet(s) Oral at bedtime    MEDICATIONS  (PRN):  polyethylene glycol 3350 17 Gram(s) Oral daily PRN Constipation

## 2020-07-22 NOTE — DISCHARGE NOTE PROVIDER - CARE PROVIDER_API CALL
Waqar Graff  GASTROENTEROLOGY  [address] [place] [address]  Phone: [phone]  Fax: [phone]  Follow Up Time: 2 weeks    Martin Horn  SURGERY  [address] [place] [id number]  Phone: [phone]  Fax: [phone]  Follow Up Time: 1 month

## 2020-07-22 NOTE — DISCHARGE NOTE PROVIDER - NSDCCPCAREPLAN_GEN_ALL_CORE_FT
PRINCIPAL DISCHARGE DIAGNOSIS  Diagnosis: Foreign body in stomach  Assessment and Plan of Treatment: You came in with Foreign body (metallic papert clip)  in your stomach piercing through wall of stomach into liver . It was extracted with advanced GI . Repeat CT Abdomen post extraction revelaed no foreign body. Continue PO ciprofloxacin and flagyl for 7 days along with protonix 40mg BID for 7 days and once a day later.  Imaging incidentally revealed hypodensity in your liver , lobularity in kidneys and nodularity at tip of appendix. You need to follow up with surgery as outpatient and undergo MRI abdomen.   Please SEEK immediate medical attention if you notice any blood in stool/ blood in vomitus/dizziness. PRINCIPAL DISCHARGE DIAGNOSIS  Diagnosis: Foreign body in stomach  Assessment and Plan of Treatment: You came in with Foreign body (metallic papert clip)  in your stomach piercing through wall of stomach into liver . It was extracted with advanced GI . Repeat CT Abdomen post extraction revelaed no foreign body. Continue PO ciprofloxacin and flagyl for 7 days along with protonix 40mg BID for 7 days and once a day later.  Imaging incidentally revealed hypodensity in your liver , lobularity in kidneys and nodularity at tip of appendix. You need to follow up with surgery as outpatient and undergo MRI abdomen.   Please SEEK immediate medical attention if you notice any blood in stool/ blood in vomitus/dizziness.  You will be on antibiotics by mouth for the next 5 days. It is important that you do not drink alcohol while taking these antibiotics.

## 2020-07-22 NOTE — PROGRESS NOTE ADULT - REASON FOR ADMISSION
Epigastric pain

## 2020-07-22 NOTE — PROGRESS NOTE ADULT - ASSESSMENT
Patient is a 70 y/o gentleman with PMHx of HTN and HLD presents to the ED complaining of epigastric pain. Patient was found to have a foreign body and EGD done emergently over this past weekend could not visualized foreign body. Imaging reviewed with radiology and concern existed that foreign body was starting to embed into the Liver. Patient had EGD with myotomy and sub mucosal tunneling with foreign body extraction, coagulation of vessels, and Endoscopic closure. Patient feels well today . He has no abdominal pain, nausea, fevers, or back pain. Patient post procedure started on a Pantoprazole drip which is to be switched to Pantoprazole 40mg BID on discharge for 14 days Patient also started on Cefazolin and Metronidazole to be switched to Cipro and Flagyl PO on discharge. Patient educated not to have any ETOH beverages for now, as can interact with flagyl. He also has been advised to eat soft foods for the next week. CT of the abdomen clear of foreign body, Will order MRI for Liver as outpatient as currently he has around 7 clips in place.     Foreign Body/ Post Endoscopic extraction  - PPI Pantoprazole 40mg BID for 14 days  - Can start GI soft- for 1 week  - ETOH avoidance for one week- to insure Gastric site healing and will also be discharged of flagyl which interacts with ETOH- He was made aware of this    - Cipro 500mg twice a day for five days, Flagyl 500mg three times a day for five days  - CT of the Abdomen done without retention of foreign body, Liver cyst noted- will obtain outpatient MRI  - Follow up via telehealth with Dr Graff in the next 1-2 weeks will be arranged by the office. They have all of his active contact information  - Clear for discharge from a GI perspective

## 2020-07-23 PROBLEM — Z00.00 ENCOUNTER FOR PREVENTIVE HEALTH EXAMINATION: Status: ACTIVE | Noted: 2020-07-23

## 2020-07-23 PROBLEM — E78.00 PURE HYPERCHOLESTEROLEMIA, UNSPECIFIED: Chronic | Status: ACTIVE | Noted: 2020-07-18

## 2020-07-23 PROBLEM — I10 ESSENTIAL (PRIMARY) HYPERTENSION: Chronic | Status: ACTIVE | Noted: 2020-07-18

## 2020-07-23 LAB
CULTURE RESULTS: SIGNIFICANT CHANGE UP
SPECIMEN SOURCE: SIGNIFICANT CHANGE UP

## 2020-07-23 RX ORDER — MOXIFLOXACIN HYDROCHLORIDE TABLETS, 400 MG 400 MG/1
1 TABLET, FILM COATED ORAL
Qty: 10 | Refills: 0
Start: 2020-07-23 | End: 2020-07-27

## 2020-07-23 RX ORDER — METRONIDAZOLE 500 MG
1 TABLET ORAL
Qty: 15 | Refills: 0
Start: 2020-07-23 | End: 2020-07-27

## 2020-07-29 ENCOUNTER — APPOINTMENT (OUTPATIENT)
Dept: GASTROENTEROLOGY | Facility: CLINIC | Age: 70
End: 2020-07-29
Payer: MEDICARE

## 2020-07-29 DIAGNOSIS — K76.89 OTHER SPECIFIED DISEASES OF LIVER: ICD-10-CM

## 2020-07-29 DIAGNOSIS — R10.9 UNSPECIFIED ABDOMINAL PAIN: ICD-10-CM
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
## 2020-07-29 DIAGNOSIS — T18.2XXA FOREIGN BODY IN STOMACH, INITIAL ENCOUNTER: ICD-10-CM

## 2020-07-29 DIAGNOSIS — F17.210 NICOTINE DEPENDENCE, CIGARETTES, UNCOMPLICATED: ICD-10-CM

## 2020-07-29 DIAGNOSIS — I10 ESSENTIAL (PRIMARY) HYPERTENSION: ICD-10-CM
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
## 2020-07-29 DIAGNOSIS — E78.5 HYPERLIPIDEMIA, UNSPECIFIED: ICD-10-CM

## 2020-07-29 DIAGNOSIS — R16.0 HEPATOMEGALY, NOT ELSEWHERE CLASSIFIED: ICD-10-CM

## 2020-07-29 PROCEDURE — 99443: CPT

## 2020-07-29 NOTE — ASSESSMENT
[FreeTextEntry1] : Patient is a 69-year-old gentleman that was recently admitted to [facility] for foreign body. Patient had a metallic foreign body that was embedded into the stomach with extension into the liver. Patient had endoscopy with mucosal tunneling and extraction of foreign body. The body appeared to be a piece of paper clip. Patient feels well currently and finish course of antibiotic and without black stools. Patient notes slight constipation.\par \par S/P Endoscopic extraction of foreign body\par - Plan Repeat EGD in September\par - ABX complete, no Melena \par \par Liver Lesion\par - Will obtain MRI after EGD done and clips either passed or removed.

## 2020-07-29 NOTE — HISTORY OF PRESENT ILLNESS
[Medical Office: ([facility])___] : at the medical office located in  [Home] : at home, [unfilled] , at the time of the visit. [Verbal consent obtained from patient] : the patient, [unfilled] [_________] : Performed [unfilled] [de-identified] : Patient is a 69-year-old gentleman that was recently admitted to [facility] for foreign body. Patient had a metallic foreign body that was embedded into the stomach with extension into the liver. Patient had endoscopy with mucosal tunneling and extraction of foreign body. The body appeared to be a piece of paper clip. Patient feels well currently and finish course of antibiotic and without black stools. Patient notes slight constipation.

## 2020-07-30 RX ORDER — PANTOPRAZOLE SODIUM 20 MG/1
1 TABLET, DELAYED RELEASE ORAL
Qty: 30 | Refills: 0
Start: 2020-07-30 | End: 2020-08-28

## 2020-07-31 ENCOUNTER — TRANSCRIPTION ENCOUNTER (OUTPATIENT)
Age: 70
End: 2020-07-31

## 2020-07-31 ENCOUNTER — INPATIENT (INPATIENT)
Facility: HOSPITAL | Age: 70
LOS: 1 days | Discharge: HOME | End: 2020-08-02
Attending: INTERNAL MEDICINE | Admitting: INTERNAL MEDICINE
Payer: MEDICARE

## 2020-07-31 VITALS
SYSTOLIC BLOOD PRESSURE: 143 MMHG | RESPIRATION RATE: 20 BRPM | HEIGHT: 65 IN | HEART RATE: 100 BPM | OXYGEN SATURATION: 99 % | TEMPERATURE: 99 F | WEIGHT: 220.9 LBS | DIASTOLIC BLOOD PRESSURE: 87 MMHG

## 2020-07-31 LAB
ALBUMIN SERPL ELPH-MCNC: 4.4 G/DL — SIGNIFICANT CHANGE UP (ref 3.5–5.2)
ALP SERPL-CCNC: 46 U/L — SIGNIFICANT CHANGE UP (ref 30–115)
ALT FLD-CCNC: 19 U/L — SIGNIFICANT CHANGE UP (ref 0–41)
ANION GAP SERPL CALC-SCNC: 16 MMOL/L — HIGH (ref 7–14)
APTT BLD: 26.9 SEC — LOW (ref 27–39.2)
AST SERPL-CCNC: 18 U/L — SIGNIFICANT CHANGE UP (ref 0–41)
BASOPHILS # BLD AUTO: 0.06 K/UL — SIGNIFICANT CHANGE UP (ref 0–0.2)
BASOPHILS NFR BLD AUTO: 0.6 % — SIGNIFICANT CHANGE UP (ref 0–1)
BILIRUB SERPL-MCNC: 0.3 MG/DL — SIGNIFICANT CHANGE UP (ref 0.2–1.2)
BLD GP AB SCN SERPL QL: SIGNIFICANT CHANGE UP
BUN SERPL-MCNC: 39 MG/DL — HIGH (ref 10–20)
CALCIUM SERPL-MCNC: 9.7 MG/DL — SIGNIFICANT CHANGE UP (ref 8.5–10.1)
CHLORIDE SERPL-SCNC: 102 MMOL/L — SIGNIFICANT CHANGE UP (ref 98–110)
CO2 SERPL-SCNC: 22 MMOL/L — SIGNIFICANT CHANGE UP (ref 17–32)
CREAT SERPL-MCNC: 1.5 MG/DL — SIGNIFICANT CHANGE UP (ref 0.7–1.5)
EOSINOPHIL # BLD AUTO: 0.12 K/UL — SIGNIFICANT CHANGE UP (ref 0–0.7)
EOSINOPHIL NFR BLD AUTO: 1.1 % — SIGNIFICANT CHANGE UP (ref 0–8)
GLUCOSE BLDC GLUCOMTR-MCNC: 105 MG/DL — HIGH (ref 70–99)
GLUCOSE SERPL-MCNC: 112 MG/DL — HIGH (ref 70–99)
HCT VFR BLD CALC: 24.9 % — LOW (ref 42–52)
HGB BLD-MCNC: 8 G/DL — LOW (ref 14–18)
IMM GRANULOCYTES NFR BLD AUTO: 0.5 % — HIGH (ref 0.1–0.3)
INR BLD: 1.04 RATIO — SIGNIFICANT CHANGE UP (ref 0.65–1.3)
LIDOCAIN IGE QN: 39 U/L — SIGNIFICANT CHANGE UP (ref 7–60)
LYMPHOCYTES # BLD AUTO: 2.79 K/UL — SIGNIFICANT CHANGE UP (ref 1.2–3.4)
LYMPHOCYTES # BLD AUTO: 26.6 % — SIGNIFICANT CHANGE UP (ref 20.5–51.1)
MCHC RBC-ENTMCNC: 29.7 PG — SIGNIFICANT CHANGE UP (ref 27–31)
MCHC RBC-ENTMCNC: 32.1 G/DL — SIGNIFICANT CHANGE UP (ref 32–37)
MCV RBC AUTO: 92.6 FL — SIGNIFICANT CHANGE UP (ref 80–94)
MONOCYTES # BLD AUTO: 0.67 K/UL — HIGH (ref 0.1–0.6)
MONOCYTES NFR BLD AUTO: 6.4 % — SIGNIFICANT CHANGE UP (ref 1.7–9.3)
NEUTROPHILS # BLD AUTO: 6.81 K/UL — HIGH (ref 1.4–6.5)
NEUTROPHILS NFR BLD AUTO: 64.8 % — SIGNIFICANT CHANGE UP (ref 42.2–75.2)
NRBC # BLD: 0 /100 WBCS — SIGNIFICANT CHANGE UP (ref 0–0)
PLATELET # BLD AUTO: 331 K/UL — SIGNIFICANT CHANGE UP (ref 130–400)
POTASSIUM SERPL-MCNC: 4.3 MMOL/L — SIGNIFICANT CHANGE UP (ref 3.5–5)
POTASSIUM SERPL-SCNC: 4.3 MMOL/L — SIGNIFICANT CHANGE UP (ref 3.5–5)
PROT SERPL-MCNC: 6.5 G/DL — SIGNIFICANT CHANGE UP (ref 6–8)
PROTHROM AB SERPL-ACNC: 12 SEC — SIGNIFICANT CHANGE UP (ref 9.95–12.87)
RBC # BLD: 2.69 M/UL — LOW (ref 4.7–6.1)
RBC # FLD: 13.7 % — SIGNIFICANT CHANGE UP (ref 11.5–14.5)
SARS-COV-2 RNA SPEC QL NAA+PROBE: SIGNIFICANT CHANGE UP
SODIUM SERPL-SCNC: 140 MMOL/L — SIGNIFICANT CHANGE UP (ref 135–146)
WBC # BLD: 10.5 K/UL — SIGNIFICANT CHANGE UP (ref 4.8–10.8)
WBC # FLD AUTO: 10.5 K/UL — SIGNIFICANT CHANGE UP (ref 4.8–10.8)

## 2020-07-31 PROCEDURE — 71045 X-RAY EXAM CHEST 1 VIEW: CPT | Mod: 26

## 2020-07-31 PROCEDURE — 99223 1ST HOSP IP/OBS HIGH 75: CPT | Mod: 25

## 2020-07-31 PROCEDURE — 99291 CRITICAL CARE FIRST HOUR: CPT

## 2020-07-31 PROCEDURE — 93010 ELECTROCARDIOGRAM REPORT: CPT

## 2020-07-31 RX ORDER — PANTOPRAZOLE SODIUM 20 MG/1
8 TABLET, DELAYED RELEASE ORAL
Qty: 80 | Refills: 0 | Status: DISCONTINUED | OUTPATIENT
Start: 2020-07-31 | End: 2020-08-02

## 2020-07-31 RX ORDER — POLYETHYLENE GLYCOL 3350 17 G/17G
17 POWDER, FOR SOLUTION ORAL DAILY
Refills: 0 | Status: DISCONTINUED | OUTPATIENT
Start: 2020-07-31 | End: 2020-08-02

## 2020-07-31 RX ORDER — SODIUM CHLORIDE 9 MG/ML
1000 INJECTION INTRAMUSCULAR; INTRAVENOUS; SUBCUTANEOUS
Refills: 0 | Status: DISCONTINUED | OUTPATIENT
Start: 2020-07-31 | End: 2020-08-02

## 2020-07-31 RX ORDER — LISINOPRIL 2.5 MG/1
1 TABLET ORAL
Qty: 0 | Refills: 0 | DISCHARGE

## 2020-07-31 RX ORDER — ATORVASTATIN CALCIUM 80 MG/1
40 TABLET, FILM COATED ORAL AT BEDTIME
Refills: 0 | Status: DISCONTINUED | OUTPATIENT
Start: 2020-07-31 | End: 2020-08-02

## 2020-07-31 RX ORDER — CHLORHEXIDINE GLUCONATE 213 G/1000ML
1 SOLUTION TOPICAL
Refills: 0 | Status: DISCONTINUED | OUTPATIENT
Start: 2020-07-31 | End: 2020-08-02

## 2020-07-31 RX ORDER — PANTOPRAZOLE SODIUM 20 MG/1
80 TABLET, DELAYED RELEASE ORAL ONCE
Refills: 0 | Status: COMPLETED | OUTPATIENT
Start: 2020-07-31 | End: 2020-07-31

## 2020-07-31 RX ADMIN — ATORVASTATIN CALCIUM 40 MILLIGRAM(S): 80 TABLET, FILM COATED ORAL at 22:04

## 2020-07-31 RX ADMIN — PANTOPRAZOLE SODIUM 10 MG/HR: 20 TABLET, DELAYED RELEASE ORAL at 17:22

## 2020-07-31 RX ADMIN — SODIUM CHLORIDE 100 MILLILITER(S): 9 INJECTION INTRAMUSCULAR; INTRAVENOUS; SUBCUTANEOUS at 20:45

## 2020-07-31 RX ADMIN — POLYETHYLENE GLYCOL 3350 17 GRAM(S): 17 POWDER, FOR SOLUTION ORAL at 22:04

## 2020-07-31 RX ADMIN — PANTOPRAZOLE SODIUM 80 MILLIGRAM(S): 20 TABLET, DELAYED RELEASE ORAL at 17:22

## 2020-07-31 NOTE — ED PROVIDER NOTE - OBJECTIVE STATEMENT
The patient is a 69 male with a history of HTN, DL who presented on 7/18/20 for episgastric pain and found to have a metallic foreign body in stomach and had EGD removal by Dr. Graff. Pt states he was doing fine until 36 hours ago when he began having mulitple episodes of black stools associated with fatigue and shortness of breath. Pt was told to come to ED for eval by GI and possible scope.

## 2020-07-31 NOTE — CONSULT NOTE ADULT - SUBJECTIVE AND OBJECTIVE BOX
Gastroenterology/Hepatology Consultation    For questions and inquiries please page [phone].  For urgent matters or after 5pm and on weekends please page the fellow on call through the GI paging system.    69y Male with   Patient is a 69y old  Male who presents with a chief complaint of melena    GI Hx:  The patient is a69 yo M Hx of HTN and DL who presented on July 18 complaining of epigastric pain attribued to a metallic foreing body whic hwas initially embedded in the deep layers of te stomach. The foreign body was removed by ESD after using a tunneling technique. He did well afterwards and was tolertaing diet x       Previous colonoscopy(ies): None  Previous EGD(s): Patient S/P Endoscopic extraction of foreign body( Appears to be a piece of a metallic paper clip). Foreign body was extracted utilizing endoscopic myotomy technique, and tunneling under mucosa. Visible vessels were all cauterized and defect made to extract foreign body was closed with clips on 7/20. The was tolerating a regular diet and continued to take PPI BID as prescribed by the advanced GI team. Yesterday he developed melena (loose black stool) associated with dizziness and RUELAS and mild epigastric pain.  Had a small pancake at 7 am today otherwise NPO.    Review of system  General:  (-) weight loss, (-) fevers  Eyes:  (-) visual changes  CV:  (-) chest pain  Resp: (-) SOB, (-) wheezing  GI: (-) abdominal pain,  (-) nausea, (-) vomiting, (-) dysphagia, (-) diarrhea, (-) constipation, (-) rectal bleeding, (+) melena, (-) hematemesis.  Neuro: (-) confusion, (-) weakness  Psych:  (-) Hallucinations  Heme:  (-) easy bruisability    Past medical/surgical Hx:  PAST MEDICAL & SURGICAL HISTORY:  Hypertension  High blood cholesterol    Home Medications:  atorvastatin 40 mg oral tablet: 1 tab(s) orally once a day (at bedtime)  lisinopril 10 mg oral tablet: 1 tab(s) orally once a day      Allergies: No Known Allergies      Current Medications:   pantoprazole  Injectable 80 milliGRAM(s) IV Push Once  pantoprazole Infusion 8 mG/Hr IV Continuous <Continuous>      Physical exam:  T(C): 37.3 (07-31-20 @ 15:55), Max: 37.3 (07-31-20 @ 15:55)  HR: 100 (07-31-20 @ 15:55) (100 - 100)  BP: 143/87 (07-31-20 @ 15:55) (143/87 - 143/87)  RR: 20 (07-31-20 @ 15:55) (20 - 20)  SpO2: 99% (07-31-20 @ 15:55) (99% - 99%)  GENERAL: NAD  HEAD:  Atraumatic, Normocephalic  EYES: Sclera:NL  NECK: Supple, no JVD or thyromegaly  CHEST/LUNG: Good bilateral air entry  HEART: normal S1, S2. Regular  ABDOMEN: (-) distended, (-) tender, (-) rebound, (+) BS, (-)HSM  EXTREMITIES: (-) edema  NEUROLOGY: (-) asterixis  SKIN: (-) jaundice  FELICITAS: Not performed    Data:    MCV   RDW   HGB trend:    Liver panel trend:      Awaiting blood work

## 2020-07-31 NOTE — ED PROVIDER NOTE - CRITICAL CARE PROVIDED
additional history taking/direct patient care (not related to procedure)/consult w/ pt's family directly relating to pts condition/interpretation of diagnostic studies/documentation/consultation with other physicians

## 2020-07-31 NOTE — ED PROVIDER NOTE - PHYSICAL EXAMINATION
CONSTITUTIONAL: Well-developed; well-nourished; in no acute distress.   SKIN: warm, dry  HEAD: Normocephalic; atraumatic.  EYES: EOMI, normal sclera and conjunctiva   ENT: No nasal discharge; airway clear.  NECK: Supple; non tender.  CARD: S1, S2 normal; no murmurs, gallops, or rubs. Regular rate and rhythm.   RESP: No wheezes, rales or rhonchi.  ABD: soft ntnd  EXT: Normal ROM.  No clubbing, cyanosis or edema.   LYMPH: No acute cervical adenopathy.  NEURO: Alert, oriented, grossly unremarkable  PSYCH: Cooperative, appropriate.

## 2020-07-31 NOTE — CONSULT NOTE ADULT - ASSESSMENT
70 yo M SP ESD/tunneling for removal of a deep foreign body currently with melena likely post procedural delayed bleeding.    1)Post "polypectomy" like bleeding    Rec:  - PPI IV drip  - Type and screen  - Stat CBC, CMP, INR, PTT, COVOD-19 (rapid)  - EGD today

## 2020-07-31 NOTE — ED PROVIDER NOTE - NS ED ROS FT
Eyes:  No visual changes, eye pain or discharge.  ENMT:  No hearing changes, pain, discharge or infections. No neck pain or stiffness.  Cardiac:  +SOB, no edema. .  Respiratory:  No cough or respiratory distress. No hemoptysis. No history of asthma or RAD.  GI:  No nausea, vomiting, abdominal pain. +Black stools.   :  No dysuria, frequency or burning.  MS:  No myalgia, muscle weakness, joint pain or back pain.  Neuro:  No headache or weakness.  No LOC.  Skin:  No skin rash.   Endocrine: No history of thyroid disease or diabetes.

## 2020-07-31 NOTE — H&P ADULT - HISTORY OF PRESENT ILLNESS
Mr. Anderson is a 70 yo male patient Hx of HTN and DL who presented on July 18 complaining of epigastric pain attribued to a metallic foreing body whic hwas initially embedded in the deep layers of te stomach. The foreign body was removed by ESD after using a tunneling technique. He did well afterwards and was tolertaing diet x       Previous colonoscopy(ies): None  Previous EGD(s): Patient S/P Endoscopic extraction of foreign body( Appears to be a piece of a metallic paper clip). Foreign body was extracted utilizing endoscopic myotomy technique, and tunneling under mucosa. Visible vessels were all cauterized and defect made to extract foreign body was closed with clips on 7/20. The was tolerating a regular diet and continued to take PPI BID as prescribed by the advanced GI team. Yesterday he developed melena (loose black stool) associated with dizziness and RUELAS and mild epigastric pain.  Had a small pancake at 7 am today otherwise NPO. Mr. Anderson is a 68 yo male patient Hx of HTN and DL who presented complaining of black stools and Fatigue.     On July 18, patient was complaining of epigastric pain attributed to a metallic foreign body which was initially embedded in the deep layers of the stomach. The foreign body was removed by ESD after using a tunneling technique. He did well afterwards and was tolertaing diet . Patient states he was doing fine until 36 hours ago when he began having multiple episodes of black stools associated with fatigue and shortness of breath. Pt was told to come to ED for eval by GI and possible scope.    In ED, patient was normotensive, tachycardic. Blood work showed Hb: 8 down from 13. Patient was evaluated by advanced GI and plan for urgent EGD tonight and will be admitted to MICU.

## 2020-07-31 NOTE — H&P ADULT - NSHPPHYSICALEXAM_GEN_ALL_CORE
Vital Signs Last 24 Hrs  T(C): 37.3 (31 Jul 2020 18:16), Max: 37.3 (31 Jul 2020 15:55)  T(F): 99.1 (31 Jul 2020 15:55), Max: 99.1 (31 Jul 2020 15:55)  HR: 100 (31 Jul 2020 18:16) (100 - 100)  BP: 143/87 (31 Jul 2020 18:16) (143/87 - 143/87)  RR: 20 (31 Jul 2020 18:16) (20 - 20)  SpO2: 99% (31 Jul 2020 18:16) (99% - 99%)    PHYSICAL EXAM:  GENERAL: NAD, speaks in full sentences, no signs of respiratory distress  HEAD:  Atraumatic, Normocephalic  EYES: EOMI, PERRLA, conjunctiva and sclera clear  NECK: Supple, No JVD  CHEST/LUNG: Clear to auscultation bilaterally; No wheeze; No crackles; No accessory muscles used  HEART: Regular rate and rhythm; No murmurs;   ABDOMEN: Soft, Nontender, Nondistended; Bowel sounds present; No guarding  EXTREMITIES:  2+ Peripheral Pulses, No cyanosis or edema  PSYCH: AAOx3  NEUROLOGY: non-focal  SKIN: No rashes or lesions Vital Signs Last 24 Hrs  T(C): 37.3 (31 Jul 2020 18:16), Max: 37.3 (31 Jul 2020 15:55)  T(F): 99.1 (31 Jul 2020 15:55), Max: 99.1 (31 Jul 2020 15:55)  HR: 100 (31 Jul 2020 18:16) (100 - 100)  BP: 143/87 (31 Jul 2020 18:16) (143/87 - 143/87)  RR: 20 (31 Jul 2020 18:16) (20 - 20)  SpO2: 99% (31 Jul 2020 18:16) (99% - 99%)    PHYSICAL EXAM:  GENERAL: NAD, speaks in full sentences, no signs of respiratory distress  HEAD:  Atraumatic, Normocephalic  EYES: poorly injected conjunctiva and sclera clear  NECK: Supple  CHEST/LUNG: Clear to auscultation bilaterally; No wheeze; No crackles; No accessory muscles used  HEART: Regular rate and rhythm; No murmurs;   ABDOMEN: Soft, Nontender, Nondistended; Bowel sounds present; No guarding  EXTREMITIES:  2+ Peripheral Pulses, No cyanosis or edema  PSYCH: AAOx3  NEUROLOGY: non-focal  SKIN: No rashes or lesions

## 2020-07-31 NOTE — H&P ADULT - NSHPLABSRESULTS_GEN_ALL_CORE
Labs:                            8.0    10.50 )-----------( 331      ( 31 Jul 2020 16:58 )             24.9       07-31    140  |  102  |  39<H>  ----------------------------<  112<H>  4.3   |  22  |  1.5    Ca    9.7      31 Jul 2020 16:58    TPro  6.5  /  Alb  4.4  /  TBili  0.3  /  DBili  x   /  AST  18  /  ALT  19  /  AlkPhos  46  07-31                  PT/INR - ( 31 Jul 2020 16:58 )   PT: 12.00 sec;   INR: 1.04 ratio         PTT - ( 31 Jul 2020 16:58 )  PTT:26.9 sec    Lactate Trend            CAPILLARY BLOOD GLUCOSE            Culture Results:   No Growth Final (07-18 @ 01:55) Labs:                            8.0    10.50 )-----------( 331      ( 31 Jul 2020 16:58 )             24.9       07-31    140  |  102  |  39<H>  ----------------------------<  112<H>  4.3   |  22  |  1.5    Ca    9.7      31 Jul 2020 16:58    TPro  6.5  /  Alb  4.4  /  TBili  0.3  /  DBili  x   /  AST  18  /  ALT  19  /  AlkPhos  46  07-31                  PT/INR - ( 31 Jul 2020 16:58 )   PT: 12.00 sec;   INR: 1.04 ratio         PTT - ( 31 Jul 2020 16:58 )  PTT:26.9 sec            Culture Results:   No Growth Final (07-18 @ 01:55)

## 2020-07-31 NOTE — PRE-ANESTHESIA EVALUATION ADULT - NSANTHADDINFOFT_GEN_ALL_CORE
Procedure/Risks explained for GA with ETT + routien monitoring. Patient understands stated anesthetic plan and agrees to proceed.

## 2020-07-31 NOTE — ED PROVIDER NOTE - ATTENDING CONTRIBUTION TO CARE
70 yo M Hx of HTN and DL who presented on July 18 complaining of epigastric pain, found to have metalic FB in stomach s/p endoscopic removal by aster. pt had been doing well until early yesterday morning. pt endorsing black stools, fatigue. pt was ref to ed by GI for eval and possible scope.    vs mild tachycardia  gen- NAD, aaox3  card-rrr  lungs-ctab, no wheezing or rhonchi  abd-sntnd, no guarding or rebound  neuro- full str/sensation, cn ii-xii grossly intact, normal coordination and gait    d/w GI, c/f UGIB   rec basic labs, T&S, INR, protonix IVP/gtt  likely to get emergent endoscopy

## 2020-07-31 NOTE — CHART NOTE - NSCHARTNOTEFT_GEN_A_CORE
PACU ANESTHESIA ADMISSION NOTE      Procedure:   Post op diagnosis:      ____  Intubated  TV:______       Rate: ______      FiO2: ______    __x__  Patent Airway    __x__  Full return of protective reflexes    ____  Full recovery from anesthesia / back to baseline     Vitals:   T:   97.8F       R: 18                 BP:   158/75               Sat: 95                  P: 80      Mental Status:  ___x_ Awake   _____ Alert   _____ Drowsy   _____ Sedated    Nausea/Vomiting:  _x___ NO  ______Yes,   See Post - Op Orders          Pain Scale (0-10):  __0___    Treatment: ____ None    ____ See Post - Op/PCA Orders    Post - Operative Fluids:   ____ Oral   _x___ See Post - Op Orders    Plan: Discharge:   ____Home       _____Floor     __x___Critical Care    _____  Other:_________________    Comments: Patient awake, VS stable, no anesthesia complications.

## 2020-07-31 NOTE — H&P ADULT - ASSESSMENT
Mr. Anderson is a 68 yo male patient Hx of HTN and DL who presented complaining of black stools and Fatigue.     # Post "polypectomy" bleeding  - Tachycardia no hypotension  - NPO  - Pantoprazole 80 mg IV stat then 8mg/hour  - S/p 1 unit of PRBC in ED  - CBC Q 8 hours  - Evaluated by advance GI: Urgent EGD tonight.     # HTN  - holding lisinopril tonight    # DL  - will continue atorvastatin 80 mg tonight.     DVT prophylaxis: SCD Mr. Anderson is a 68 yo male patient Hx of HTN and DL who presented complaining of black stools and Fatigue.     # Post "polypectomy" bleeding  - Tachycardia no hypotension  - NPO  - Pantoprazole 80 mg IV stat then 8mg/hour  - S/p 1 unit of PRBC in ED  - CBC Q 8 hours  - Evaluated by advance GI: Urgent EGD tonight.     # HTN  - holding amlodipine tonight    # DL  - will continue atorvastatin 40 mg tonight.     DVT prophylaxis: SCD

## 2020-07-31 NOTE — ED PROVIDER NOTE - PROGRESS NOTE DETAILS
d/w andrawes, states transfuse 1U, 1U hold, will take pt emergently to endo. covid sent as normal stat (not RAPID) but was changed per endo suite protocol accepted to ICU by Habib Pt consented for blood; going to endoscopy; signed out to ICU aristides oGmes

## 2020-07-31 NOTE — ED ADULT TRIAGE NOTE - CHIEF COMPLAINT QUOTE
" My GI doctor told me to come here for another endoscopy. I have black stool started yesterday x 6 times". pt swallowed a piece of metal on 7/5 and was removed endoscopically on 7/21. pt c/o abd discomfort, denies chest pain or SOB in triage

## 2020-08-01 LAB
ALBUMIN SERPL ELPH-MCNC: 3.9 G/DL — SIGNIFICANT CHANGE UP (ref 3.5–5.2)
ALP SERPL-CCNC: 43 U/L — SIGNIFICANT CHANGE UP (ref 30–115)
ALT FLD-CCNC: 16 U/L — SIGNIFICANT CHANGE UP (ref 0–41)
ANION GAP SERPL CALC-SCNC: 14 MMOL/L — SIGNIFICANT CHANGE UP (ref 7–14)
AST SERPL-CCNC: 19 U/L — SIGNIFICANT CHANGE UP (ref 0–41)
BASOPHILS # BLD AUTO: 0.04 K/UL — SIGNIFICANT CHANGE UP (ref 0–0.2)
BASOPHILS # BLD AUTO: 0.05 K/UL — SIGNIFICANT CHANGE UP (ref 0–0.2)
BASOPHILS # BLD AUTO: 0.06 K/UL — SIGNIFICANT CHANGE UP (ref 0–0.2)
BASOPHILS NFR BLD AUTO: 0.5 % — SIGNIFICANT CHANGE UP (ref 0–1)
BASOPHILS NFR BLD AUTO: 0.5 % — SIGNIFICANT CHANGE UP (ref 0–1)
BASOPHILS NFR BLD AUTO: 0.7 % — SIGNIFICANT CHANGE UP (ref 0–1)
BILIRUB SERPL-MCNC: 0.6 MG/DL — SIGNIFICANT CHANGE UP (ref 0.2–1.2)
BUN SERPL-MCNC: 28 MG/DL — HIGH (ref 10–20)
CALCIUM SERPL-MCNC: 8.7 MG/DL — SIGNIFICANT CHANGE UP (ref 8.5–10.1)
CHLORIDE SERPL-SCNC: 103 MMOL/L — SIGNIFICANT CHANGE UP (ref 98–110)
CO2 SERPL-SCNC: 22 MMOL/L — SIGNIFICANT CHANGE UP (ref 17–32)
CREAT SERPL-MCNC: 1.2 MG/DL — SIGNIFICANT CHANGE UP (ref 0.7–1.5)
EOSINOPHIL # BLD AUTO: 0.21 K/UL — SIGNIFICANT CHANGE UP (ref 0–0.7)
EOSINOPHIL # BLD AUTO: 0.22 K/UL — SIGNIFICANT CHANGE UP (ref 0–0.7)
EOSINOPHIL # BLD AUTO: 0.24 K/UL — SIGNIFICANT CHANGE UP (ref 0–0.7)
EOSINOPHIL NFR BLD AUTO: 2.2 % — SIGNIFICANT CHANGE UP (ref 0–8)
EOSINOPHIL NFR BLD AUTO: 2.9 % — SIGNIFICANT CHANGE UP (ref 0–8)
EOSINOPHIL NFR BLD AUTO: 3 % — SIGNIFICANT CHANGE UP (ref 0–8)
GLUCOSE SERPL-MCNC: 102 MG/DL — HIGH (ref 70–99)
HCT VFR BLD CALC: 23.9 % — LOW (ref 42–52)
HCT VFR BLD CALC: 27.6 % — LOW (ref 42–52)
HCT VFR BLD CALC: 27.7 % — LOW (ref 42–52)
HCV AB S/CO SERPL IA: 0.04 COI — SIGNIFICANT CHANGE UP
HCV AB SERPL-IMP: SIGNIFICANT CHANGE UP
HGB BLD-MCNC: 7.6 G/DL — LOW (ref 14–18)
HGB BLD-MCNC: 8.9 G/DL — LOW (ref 14–18)
HGB BLD-MCNC: 8.9 G/DL — LOW (ref 14–18)
IMM GRANULOCYTES NFR BLD AUTO: 0.2 % — SIGNIFICANT CHANGE UP (ref 0.1–0.3)
IMM GRANULOCYTES NFR BLD AUTO: 0.3 % — SIGNIFICANT CHANGE UP (ref 0.1–0.3)
IMM GRANULOCYTES NFR BLD AUTO: 0.3 % — SIGNIFICANT CHANGE UP (ref 0.1–0.3)
LYMPHOCYTES # BLD AUTO: 1.9 K/UL — SIGNIFICANT CHANGE UP (ref 1.2–3.4)
LYMPHOCYTES # BLD AUTO: 2.25 K/UL — SIGNIFICANT CHANGE UP (ref 1.2–3.4)
LYMPHOCYTES # BLD AUTO: 25.8 % — SIGNIFICANT CHANGE UP (ref 20.5–51.1)
LYMPHOCYTES # BLD AUTO: 27.6 % — SIGNIFICANT CHANGE UP (ref 20.5–51.1)
LYMPHOCYTES # BLD AUTO: 3.31 K/UL — SIGNIFICANT CHANGE UP (ref 1.2–3.4)
LYMPHOCYTES # BLD AUTO: 34.9 % — SIGNIFICANT CHANGE UP (ref 20.5–51.1)
MAGNESIUM SERPL-MCNC: 1.9 MG/DL — SIGNIFICANT CHANGE UP (ref 1.8–2.4)
MCHC RBC-ENTMCNC: 29.1 PG — SIGNIFICANT CHANGE UP (ref 27–31)
MCHC RBC-ENTMCNC: 29.3 PG — SIGNIFICANT CHANGE UP (ref 27–31)
MCHC RBC-ENTMCNC: 29.4 PG — SIGNIFICANT CHANGE UP (ref 27–31)
MCHC RBC-ENTMCNC: 31.8 G/DL — LOW (ref 32–37)
MCHC RBC-ENTMCNC: 32.1 G/DL — SIGNIFICANT CHANGE UP (ref 32–37)
MCHC RBC-ENTMCNC: 32.2 G/DL — SIGNIFICANT CHANGE UP (ref 32–37)
MCV RBC AUTO: 91.1 FL — SIGNIFICANT CHANGE UP (ref 80–94)
MCV RBC AUTO: 91.1 FL — SIGNIFICANT CHANGE UP (ref 80–94)
MCV RBC AUTO: 91.6 FL — SIGNIFICANT CHANGE UP (ref 80–94)
MONOCYTES # BLD AUTO: 0.51 K/UL — SIGNIFICANT CHANGE UP (ref 0.1–0.6)
MONOCYTES # BLD AUTO: 0.59 K/UL — SIGNIFICANT CHANGE UP (ref 0.1–0.6)
MONOCYTES # BLD AUTO: 0.7 K/UL — HIGH (ref 0.1–0.6)
MONOCYTES NFR BLD AUTO: 6.9 % — SIGNIFICANT CHANGE UP (ref 1.7–9.3)
MONOCYTES NFR BLD AUTO: 7.2 % — SIGNIFICANT CHANGE UP (ref 1.7–9.3)
MONOCYTES NFR BLD AUTO: 7.4 % — SIGNIFICANT CHANGE UP (ref 1.7–9.3)
NEUTROPHILS # BLD AUTO: 4.67 K/UL — SIGNIFICANT CHANGE UP (ref 1.4–6.5)
NEUTROPHILS # BLD AUTO: 4.98 K/UL — SIGNIFICANT CHANGE UP (ref 1.4–6.5)
NEUTROPHILS # BLD AUTO: 5.19 K/UL — SIGNIFICANT CHANGE UP (ref 1.4–6.5)
NEUTROPHILS NFR BLD AUTO: 54.7 % — SIGNIFICANT CHANGE UP (ref 42.2–75.2)
NEUTROPHILS NFR BLD AUTO: 61.4 % — SIGNIFICANT CHANGE UP (ref 42.2–75.2)
NEUTROPHILS NFR BLD AUTO: 63.5 % — SIGNIFICANT CHANGE UP (ref 42.2–75.2)
NRBC # BLD: 0 /100 WBCS — SIGNIFICANT CHANGE UP (ref 0–0)
PLATELET # BLD AUTO: 258 K/UL — SIGNIFICANT CHANGE UP (ref 130–400)
PLATELET # BLD AUTO: 269 K/UL — SIGNIFICANT CHANGE UP (ref 130–400)
PLATELET # BLD AUTO: 278 K/UL — SIGNIFICANT CHANGE UP (ref 130–400)
POTASSIUM SERPL-MCNC: 3.8 MMOL/L — SIGNIFICANT CHANGE UP (ref 3.5–5)
POTASSIUM SERPL-SCNC: 3.8 MMOL/L — SIGNIFICANT CHANGE UP (ref 3.5–5)
PROT SERPL-MCNC: 5.9 G/DL — LOW (ref 6–8)
RBC # BLD: 2.61 M/UL — LOW (ref 4.7–6.1)
RBC # BLD: 3.03 M/UL — LOW (ref 4.7–6.1)
RBC # BLD: 3.04 M/UL — LOW (ref 4.7–6.1)
RBC # FLD: 14.4 % — SIGNIFICANT CHANGE UP (ref 11.5–14.5)
RBC # FLD: 14.5 % — SIGNIFICANT CHANGE UP (ref 11.5–14.5)
RBC # FLD: 14.7 % — HIGH (ref 11.5–14.5)
SODIUM SERPL-SCNC: 139 MMOL/L — SIGNIFICANT CHANGE UP (ref 135–146)
WBC # BLD: 7.36 K/UL — SIGNIFICANT CHANGE UP (ref 4.8–10.8)
WBC # BLD: 8.14 K/UL — SIGNIFICANT CHANGE UP (ref 4.8–10.8)
WBC # BLD: 9.49 K/UL — SIGNIFICANT CHANGE UP (ref 4.8–10.8)
WBC # FLD AUTO: 7.36 K/UL — SIGNIFICANT CHANGE UP (ref 4.8–10.8)
WBC # FLD AUTO: 8.14 K/UL — SIGNIFICANT CHANGE UP (ref 4.8–10.8)
WBC # FLD AUTO: 9.49 K/UL — SIGNIFICANT CHANGE UP (ref 4.8–10.8)

## 2020-08-01 PROCEDURE — 43236 UPPR GI SCOPE W/SUBMUC INJ: CPT | Mod: XU

## 2020-08-01 PROCEDURE — 43255 EGD CONTROL BLEEDING ANY: CPT

## 2020-08-01 RX ORDER — LANOLIN ALCOHOL/MO/W.PET/CERES
1 CREAM (GRAM) TOPICAL AT BEDTIME
Refills: 0 | Status: DISCONTINUED | OUTPATIENT
Start: 2020-08-01 | End: 2020-08-01

## 2020-08-01 RX ORDER — LANOLIN ALCOHOL/MO/W.PET/CERES
3 CREAM (GRAM) TOPICAL AT BEDTIME
Refills: 0 | Status: DISCONTINUED | OUTPATIENT
Start: 2020-08-01 | End: 2020-08-02

## 2020-08-01 RX ORDER — LORATADINE 10 MG/1
10 TABLET ORAL ONCE
Refills: 0 | Status: COMPLETED | OUTPATIENT
Start: 2020-08-01 | End: 2020-08-01

## 2020-08-01 RX ORDER — LORATADINE 10 MG/1
10 TABLET ORAL DAILY
Refills: 0 | Status: DISCONTINUED | OUTPATIENT
Start: 2020-08-01 | End: 2020-08-01

## 2020-08-01 RX ADMIN — Medication 3 MILLIGRAM(S): at 22:34

## 2020-08-01 RX ADMIN — ATORVASTATIN CALCIUM 40 MILLIGRAM(S): 80 TABLET, FILM COATED ORAL at 21:20

## 2020-08-01 RX ADMIN — PANTOPRAZOLE SODIUM 10 MG/HR: 20 TABLET, DELAYED RELEASE ORAL at 03:28

## 2020-08-01 RX ADMIN — CHLORHEXIDINE GLUCONATE 1 APPLICATION(S): 213 SOLUTION TOPICAL at 06:27

## 2020-08-01 RX ADMIN — POLYETHYLENE GLYCOL 3350 17 GRAM(S): 17 POWDER, FOR SOLUTION ORAL at 11:37

## 2020-08-01 RX ADMIN — LORATADINE 10 MILLIGRAM(S): 10 TABLET ORAL at 01:29

## 2020-08-01 NOTE — PROGRESS NOTE ADULT - SUBJECTIVE AND OBJECTIVE BOX
Gastroenterology progress note:     Patient is a 69y old  Male who presents with a chief complaint of Melena and fatigue (31 Jul 2020 18:56)       Admitted on: 07-31-20    We are following the patient for melena      Interval History: patient is NPO, 1 BM this morning, dark. no nausea, no vomiting no abdominal pain. s/p 2units PRBCs overnight    Patient's medical problems are stable   Prior records reviewed (Y/N): Y   History obtained from someone other than patient (Y/N): N      PAST MEDICAL & SURGICAL HISTORY:  Hypertension  High blood cholesterol      MEDICATIONS  (STANDING):  atorvastatin 40 milliGRAM(s) Oral at bedtime  chlorhexidine 4% Liquid 1 Application(s) Topical <User Schedule>  pantoprazole Infusion 8 mG/Hr (10 mL/Hr) IV Continuous <Continuous>  polyethylene glycol 3350 17 Gram(s) Oral daily  sodium chloride 0.9%. 1000 milliLiter(s) (100 mL/Hr) IV Continuous <Continuous>    Allergies  No Known Allergies    Review of Systems:   General: no fever  HEENT: no hemoptysis  Cardiovascular:  No Chest Pain, No Palpitations  Respiratory:  No Cough, No Dyspnea  Gastrointestinal:  As described in HPI  Hematology: no bruising or hematoma   Neurology: no new motor deficit  Skin: no new rash    Physical Examination:  T(C): 36.6 (08-01-20 @ 08:00), Max: 37.3 (07-31-20 @ 15:55)  HR: 82 (08-01-20 @ 08:00) (56 - 100)  BP: 144/67 (08-01-20 @ 08:00) (103/55 - 168/85)  RR: 16 (08-01-20 @ 08:00) (14 - 31)  SpO2: 98% (08-01-20 @ 08:00) (96% - 100%)  Weight (kg): 101.4 (07-31-20 @ 20:00)    07-31-20 @ 07:01  -  08-01-20 @ 07:00  --------------------------------------------------------  IN: 1808 mL / OUT: 925 mL / NET: 883 mL    08-01-20 @ 07:01  -  08-01-20 @ 09:11  --------------------------------------------------------  IN: 110 mL / OUT: 150 mL / NET: -40 mL        Constitutional: No acute distress.  Head: normocephalic  Neck: no palpable thyroid  Eyes: EOMI  Respiratory:  No signs of respiratory distress. Lung sounds are clear bilaterally.  Cardiovascular:  S1 S2, Regular rate and rhythm.  Abdominal: Abdomen is soft, symmetric, and non-tender without distention.    Extremities: no pitting edema  Skin: No rashes, No Jaundice.        Data: (reviewed by attending)                        8.9    8.14  )-----------( 269      ( 01 Aug 2020 04:21 )             27.7     Hgb trend:  8.9  08-01-20 @ 04:21  7.6  07-31-20 @ 23:25  8.0  07-31-20 @ 16:58      07-31-20 @ 07:01  -  08-01-20 @ 07:00  --------------------------------------------------------  IN: 538 mL      08-01    139  |  103  |  28<H>  ----------------------------<  102<H>  3.8   |  22  |  1.2    Ca    8.7      01 Aug 2020 04:21  Mg     1.9     08-01    TPro  5.9<L>  /  Alb  3.9  /  TBili  0.6  /  DBili  x   /  AST  19  /  ALT  16  /  AlkPhos  43  08-01    Liver panel trend:  TBili 0.6   /   AST 19   /   ALT 16   /   AlkP 43   /   Tptn 5.9   /   Alb 3.9    /   DBili --      08-01  TBili 0.3   /   AST 18   /   ALT 19   /   AlkP 46   /   Tptn 6.5   /   Alb 4.4    /   DBili --      07-31      PT/INR - ( 31 Jul 2020 16:58 )   PT: 12.00 sec;   INR: 1.04 ratio         PTT - ( 31 Jul 2020 16:58 )  PTT:26.9 sec

## 2020-08-01 NOTE — PROGRESS NOTE ADULT - ASSESSMENT
68 yo Man with recent admission for deep foreign body impaction s/p ESD/tunneling was admitted with melena likely post procedural delayed bleeding.    *Upper GI bleeding  -Post "polypectomy" like bleeding  -s/p EGD: 1cm deep Cameron IIc (pigmented spot) ulcer at the site of the previously removed foreign body treated with the coag grasper coagulation. Site of entry of tunnel intact with clips presents.  -HD stable  -Hb increased appropriately after transfusion  -the episode of melena this morning can be old blood     Rec:  - c/w PPI IV drip  - Type and screen  - keep NPO for now  - monitor CBC   - transfuse as needed  - will follow     will discuss with attending when next CBC is back 70 yo Man with recent admission for deep foreign body impaction s/p ESD/tunneling was admitted with melena likely post procedural delayed bleeding.    *Upper GI bleeding  -Post "polypectomy" like bleeding  -s/p EGD: 1cm deep Cameron IIc (pigmented spot) ulcer at the site of the previously removed foreign body treated with the coag grasper coagulation. Site of entry of tunnel intact with clips presents.  -HD stable  -Hb increased appropriately after transfusion  -the episode of melena this morning can be old blood     Rec:  - c/w PPI IV drip  - Type and screen  - keep NPO for now  - monitor CBC   - transfuse as needed  - will follow     Update 4PM, CBC is stable  can start clear liquid. do not advance diet.  discussed with medical team

## 2020-08-01 NOTE — PROGRESS NOTE ADULT - SUBJECTIVE AND OBJECTIVE BOX
Patient is a 69y old  Male who presents with a chief complaint of Melena and fatigue (01 Aug 2020 09:11)      HPI:  Mr. Anderson is a 68 yo male patient Hx of HTN and DL who presented complaining of black stools and Fatigue.     On July 18, patient was complaining of epigastric pain attributed to a metallic foreign body which was initially embedded in the deep layers of the stomach. The foreign body was removed by ESD after using a tunneling technique. He did well afterwards and was tolertaing diet . Patient states he was doing fine until 36 hours ago when he began having multiple episodes of black stools associated with fatigue and shortness of breath. Pt was told to come to ED for eval by GI and possible scope.    In ED, patient was normotensive, tachycardic. Blood work showed Hb: 8 down from 13. Patient was evaluated by advanced GI and plan for urgent EGD tonight and will be admitted to MICU. (31 Jul 2020 18:56)  so went for EGD and result ulcer with pigmented spot at site of previous foreign body removal s/p coagulation now stable s/p 2 unit prbc     PAST MEDICAL & SURGICAL HISTORY:  Hypertension  High blood cholesterol    Allergies    No Known Allergies    Intolerances      Family history : no cardiovscular family history   Home Medications:  atorvastatin 40 mg oral tablet: 1 tab(s) orally once a day (at bedtime) (31 Jul 2020 21:12)  Norvasc 5 mg oral tablet: 1 tab(s) orally once a day (31 Jul 2020 21:12)    Occupation:  Alochol: Denied  Smoking: Denied  Drug Use: Denied  Marital Status:         ROS: as in HPI; All other systems reviewed are negative    ICU Vital Signs Last 24 Hrs  T(C): 36.6 (01 Aug 2020 08:00), Max: 37.3 (31 Jul 2020 15:55)  T(F): 97.9 (01 Aug 2020 08:00), Max: 99.1 (31 Jul 2020 15:55)  HR: 82 (01 Aug 2020 08:00) (56 - 100)  BP: 144/67 (01 Aug 2020 08:00) (103/55 - 168/85)  BP(mean): 91 (01 Aug 2020 08:00) (75 - 113)  ABP: --  ABP(mean): --  RR: 16 (01 Aug 2020 08:00) (14 - 31)  SpO2: 98% (01 Aug 2020 08:00) (96% - 100%)        Physical Examination:    General: No acute distress.  Alert, oriented, interactive, nonfocal    HEENT: Pupils equal, reactive to light.  Symmetric.    PULM: Clear to auscultation bilaterally, no significant sputum production    CVS: Regular rate and rhythm, no murmurs, rubs, or gallops    ABD: Soft, nondistended, nontender, normoactive bowel sounds, no masses    EXT: No edema, nontender, no clubbing     SKIN: Warm and well perfused, no rashes noted.    Neurology : no motor or sensory deficit     Musculoskeletal : move all extremity     Lymphatic system: no Palpable node               I&O's Detail    31 Jul 2020 07:01  -  01 Aug 2020 07:00  --------------------------------------------------------  IN:    Oral Fluid: 50 mL    Packed Red Blood Cells: 538 mL    pantoprazole Infusion: 120 mL    sodium chloride 0.9%.: 1100 mL  Total IN: 1808 mL    OUT:    Voided: 925 mL  Total OUT: 925 mL    Total NET: 883 mL      01 Aug 2020 07:01  -  01 Aug 2020 09:35  --------------------------------------------------------  IN:    pantoprazole Infusion: 10 mL    sodium chloride 0.9%.: 100 mL  Total IN: 110 mL    OUT:    Voided: 150 mL  Total OUT: 150 mL    Total NET: -40 mL            LABS:                        8.9    8.14  )-----------( 269      ( 01 Aug 2020 04:21 )             27.7     01 Aug 2020 04:21    139    |  103    |  28     ----------------------------<  102    3.8     |  22     |  1.2      Ca    8.7        01 Aug 2020 04:21  Mg     1.9       01 Aug 2020 04:21    TPro  5.9    /  Alb  3.9    /  TBili  0.6    /  DBili  x      /  AST  19     /  ALT  16     /  AlkPhos  43     01 Aug 2020 04:21  Amylase x     lipase x              CAPILLARY BLOOD GLUCOSE      POCT Blood Glucose.: 105 mg/dL (31 Jul 2020 19:41)    PT/INR - ( 31 Jul 2020 16:58 )   PT: 12.00 sec;   INR: 1.04 ratio         PTT - ( 31 Jul 2020 16:58 )  PTT:26.9 sec    Culture        MEDICATIONS  (STANDING):  atorvastatin 40 milliGRAM(s) Oral at bedtime  chlorhexidine 4% Liquid 1 Application(s) Topical <User Schedule>  pantoprazole Infusion 8 mG/Hr (10 mL/Hr) IV Continuous <Continuous>  polyethylene glycol 3350 17 Gram(s) Oral daily  sodium chloride 0.9%. 1000 milliLiter(s) (100 mL/Hr) IV Continuous <Continuous>    MEDICATIONS  (PRN):        RADIOLOGY: ***     CXR: no infiltrate   TLC:  OG:  ET tube:        CAM ICU:  ECHO:

## 2020-08-01 NOTE — PROGRESS NOTE ADULT - ASSESSMENT
IMPRESSION:  GI bleed   anemia       PLAN:    CNS: no sedation     HEENT: oral care     PULMONARY: keep pox > 92 %     CARDIOVASCULAR: iv fluid     GI: GI prophylaxis.  on PPI drip follow GI  regarding feeding     RENAL: follow lytes     INFECTIOUS DISEASE: no abx     HEMATOLOGICAL:  DVT prophylaxis. sequential   Seriel cbc     ENDOCRINE:  Follow up FS.  Insulin protocol if needed.    OOb to chair   ICU monitoring       CRITICAL CARE TIME SPENT: ***

## 2020-08-01 NOTE — CHART NOTE - NSCHARTNOTEFT_GEN_A_CORE
Indication: melena RO post procedure UGIB    Findings:  1cm deep Cameron IIc (pigmented spot) ulcer at the site of the previously removed foreign body treated with the coag grasper coagulation. Site of entry of tunnel intact with clips presents.    Rec:  IV PPI  Monitor in ICU  NPO  Trend + transfuse  Will follow

## 2020-08-02 ENCOUNTER — TRANSCRIPTION ENCOUNTER (OUTPATIENT)
Age: 70
End: 2020-08-02

## 2020-08-02 VITALS — SYSTOLIC BLOOD PRESSURE: 151 MMHG | HEART RATE: 70 BPM | DIASTOLIC BLOOD PRESSURE: 76 MMHG

## 2020-08-02 LAB
ALBUMIN SERPL ELPH-MCNC: 3.9 G/DL — SIGNIFICANT CHANGE UP (ref 3.5–5.2)
ALP SERPL-CCNC: 46 U/L — SIGNIFICANT CHANGE UP (ref 30–115)
ALT FLD-CCNC: 17 U/L — SIGNIFICANT CHANGE UP (ref 0–41)
ANION GAP SERPL CALC-SCNC: 12 MMOL/L — SIGNIFICANT CHANGE UP (ref 7–14)
AST SERPL-CCNC: 19 U/L — SIGNIFICANT CHANGE UP (ref 0–41)
BILIRUB SERPL-MCNC: 0.6 MG/DL — SIGNIFICANT CHANGE UP (ref 0.2–1.2)
BUN SERPL-MCNC: 13 MG/DL — SIGNIFICANT CHANGE UP (ref 10–20)
CALCIUM SERPL-MCNC: 9 MG/DL — SIGNIFICANT CHANGE UP (ref 8.5–10.1)
CHLORIDE SERPL-SCNC: 106 MMOL/L — SIGNIFICANT CHANGE UP (ref 98–110)
CO2 SERPL-SCNC: 23 MMOL/L — SIGNIFICANT CHANGE UP (ref 17–32)
CREAT SERPL-MCNC: 1.1 MG/DL — SIGNIFICANT CHANGE UP (ref 0.7–1.5)
GLUCOSE SERPL-MCNC: 98 MG/DL — SIGNIFICANT CHANGE UP (ref 70–99)
HCT VFR BLD CALC: 28.3 % — LOW (ref 42–52)
HGB BLD-MCNC: 9.5 G/DL — LOW (ref 14–18)
MAGNESIUM SERPL-MCNC: 2 MG/DL — SIGNIFICANT CHANGE UP (ref 1.8–2.4)
MCHC RBC-ENTMCNC: 30 PG — SIGNIFICANT CHANGE UP (ref 27–31)
MCHC RBC-ENTMCNC: 33.6 G/DL — SIGNIFICANT CHANGE UP (ref 32–37)
MCV RBC AUTO: 89.3 FL — SIGNIFICANT CHANGE UP (ref 80–94)
NRBC # BLD: 0 /100 WBCS — SIGNIFICANT CHANGE UP (ref 0–0)
PLATELET # BLD AUTO: 267 K/UL — SIGNIFICANT CHANGE UP (ref 130–400)
POTASSIUM SERPL-MCNC: 4.1 MMOL/L — SIGNIFICANT CHANGE UP (ref 3.5–5)
POTASSIUM SERPL-SCNC: 4.1 MMOL/L — SIGNIFICANT CHANGE UP (ref 3.5–5)
PROT SERPL-MCNC: 5.9 G/DL — LOW (ref 6–8)
RBC # BLD: 3.17 M/UL — LOW (ref 4.7–6.1)
RBC # FLD: 14.3 % — SIGNIFICANT CHANGE UP (ref 11.5–14.5)
SODIUM SERPL-SCNC: 141 MMOL/L — SIGNIFICANT CHANGE UP (ref 135–146)
WBC # BLD: 7.33 K/UL — SIGNIFICANT CHANGE UP (ref 4.8–10.8)
WBC # FLD AUTO: 7.33 K/UL — SIGNIFICANT CHANGE UP (ref 4.8–10.8)

## 2020-08-02 PROCEDURE — 99233 SBSQ HOSP IP/OBS HIGH 50: CPT

## 2020-08-02 PROCEDURE — 71045 X-RAY EXAM CHEST 1 VIEW: CPT | Mod: 26

## 2020-08-02 RX ORDER — AMLODIPINE BESYLATE 2.5 MG/1
1 TABLET ORAL
Qty: 0 | Refills: 0 | DISCHARGE

## 2020-08-02 RX ORDER — AMLODIPINE BESYLATE 2.5 MG/1
1 TABLET ORAL
Qty: 30 | Refills: 3
Start: 2020-08-02 | End: 2020-11-29

## 2020-08-02 RX ORDER — PANTOPRAZOLE SODIUM 20 MG/1
1 TABLET, DELAYED RELEASE ORAL
Qty: 60 | Refills: 3
Start: 2020-08-02 | End: 2020-11-29

## 2020-08-02 RX ADMIN — CHLORHEXIDINE GLUCONATE 1 APPLICATION(S): 213 SOLUTION TOPICAL at 05:30

## 2020-08-02 NOTE — PROGRESS NOTE ADULT - ASSESSMENT
68 yo Man with recent admission for deep foreign body impaction s/p ESD/tunneling was admitted with melena likely post procedural delayed bleeding.    *Upper GI bleeding  -Post "polypectomy" like bleeding  -s/p EGD: 1cm deep Cameron IIc (pigmented spot) ulcer at the site of the previously removed foreign body treated with the coag grasper coagulation. Site of entry of tunnel intact with clips presents.  -hemorrhoids (never had colonoscopy before)  -HD stable  -Hb stable     Rec:  -po ppi bid  -can be discharge on clear liquid diet only  -follow up with dr Graff in 1 week  -Anusol HC suppository for 5 days  -OP colonoscopy

## 2020-08-02 NOTE — DISCHARGE NOTE PROVIDER - NSDCMRMEDTOKEN_GEN_ALL_CORE_FT
atorvastatin 40 mg oral tablet: 1 tab(s) orally once a day (at bedtime)  Norvasc 5 mg oral tablet: 1 tab(s) orally once a day  Protonix 40 mg oral delayed release tablet: 1 tab(s) orally 2 times a day atorvastatin 40 mg oral tablet: 1 tab(s) orally once a day (at bedtime)  Dulcolax Laxative 10 mg rectal suppository: 1 suppository(ies) rectally once a day, As Needed   Norvasc 5 mg oral tablet: 1 tab(s) orally once a day  Protonix 40 mg oral delayed release tablet: 1 tab(s) orally 2 times a day

## 2020-08-02 NOTE — DISCHARGE NOTE NURSING/CASE MANAGEMENT/SOCIAL WORK - PATIENT PORTAL LINK FT
You can access the FollowMyHealth Patient Portal offered by [facility] by registering at the following website: [email or web address]. By joining [facility]’s FollowMyHealth portal, you will also be able to view your health information using other applications (apps) compatible with our system.

## 2020-08-02 NOTE — PROGRESS NOTE ADULT - SUBJECTIVE AND OBJECTIVE BOX
Patient is a 69y old  Male who presents with a chief complaint of Melena and fatigue (01 Aug 2020 09:34)      Over Night Events:  Patient seen and examined.   tolerate diet   h/h stable     ROS:  See HPI    PHYSICAL EXAM    ICU Vital Signs Last 24 Hrs  T(C): 36.6 (02 Aug 2020 07:45), Max: 37.2 (02 Aug 2020 06:00)  T(F): 97.9 (02 Aug 2020 07:45), Max: 99 (02 Aug 2020 06:00)  HR: 74 (02 Aug 2020 07:45) (54 - 74)  BP: 131/67 (02 Aug 2020 07:45) (113/79 - 143/81)  BP(mean): 106 (02 Aug 2020 07:45) (81 - 119)  ABP: --  ABP(mean): --  RR: 20 (02 Aug 2020 07:45) (16 - 25)  SpO2: 96% (02 Aug 2020 07:45) (96% - 98%)      General: Aox3  HEENT:    db            Lymph Nodes: NO cervical LN   Lungs: Bilateral BS  Cardiovascular: Regular   Abdomen: Soft, Positive BS  Extremities: No clubbing   Skin: warm   Neurological: no focla deficit   Musculoskeletal: move all ext     I&O's Detail    01 Aug 2020 07:01  -  02 Aug 2020 07:00  --------------------------------------------------------  IN:    Oral Fluid: 120 mL    pantoprazole Infusion: 220 mL    sodium chloride 0.9%.: 900 mL  Total IN: 1240 mL    OUT:    Voided: 2050 mL  Total OUT: 2050 mL    Total NET: -810 mL          LABS:                          9.5    7.33  )-----------( 267      ( 02 Aug 2020 04:28 )             28.3         02 Aug 2020 04:28    141    |  106    |  13     ----------------------------<  98     4.1     |  23     |  1.1      Ca    9.0        02 Aug 2020 04:28  Mg     2.0       02 Aug 2020 04:28    TPro  5.9    /  Alb  3.9    /  TBili  0.6    /  DBili  x      /  AST  19     /  ALT  17     /  AlkPhos  46     02 Aug 2020 04:28  Amylase x     lipase x                                                 PT/INR - ( 31 Jul 2020 16:58 )   PT: 12.00 sec;   INR: 1.04 ratio         PTT - ( 31 Jul 2020 16:58 )  PTT:26.9 sec                                                                                                                                                                                        MEDICATIONS  (STANDING):  atorvastatin 40 milliGRAM(s) Oral at bedtime  chlorhexidine 4% Liquid 1 Application(s) Topical <User Schedule>  pantoprazole Infusion 8 mG/Hr (10 mL/Hr) IV Continuous <Continuous>  polyethylene glycol 3350 17 Gram(s) Oral daily  sodium chloride 0.9%. 1000 milliLiter(s) (100 mL/Hr) IV Continuous <Continuous>    MEDICATIONS  (PRN):  melatonin 3 milliGRAM(s) Oral at bedtime PRN Sleep          Xrays:  TLC:  OG:  ET tube:                                                                                       ECHO:  CAM ICU:

## 2020-08-02 NOTE — DISCHARGE NOTE PROVIDER - HOSPITAL COURSE
68 yo Man with recent admission for deep foreign body impaction s/p ESD/tunneling was admitted with melena likely post procedural delayed bleeding.        *Upper GI bleeding    -Post "polypectomy" like bleeding    -s/p EGD: 1cm deep Cameron IIc (pigmented spot) ulcer at the site of the previously removed foreign body treated with the coag grasper coagulation. Site of entry of tunnel intact with clips presents.    -HD stable    -Hb increased appropriately after transfusion and is stable    -no more melena. brown stools    - was on PPI drip. can be discharged on PO PPI bid    - to be discharged on clear liquid ( no solid food at any point!!!!!!!!!!! until evaluation by aster)    - fu with dr. Graff as outpatient

## 2020-08-02 NOTE — PROGRESS NOTE ADULT - ASSESSMENT
IMPRESSION:  GI bleed   anemia       PLAN:    CNS: no sedation     HEENT: oral care     PULMONARY: keep pox > 92 %     CARDIOVASCULAR: iv fluid     GI: GI prophylaxis.  on PPI drip follow GI  if can switch to PO or IV Q 12 hrs   advance diet     RENAL: follow lytes     INFECTIOUS DISEASE: no abx     HEMATOLOGICAL:  DVT prophylaxis. sequential   Seriel cbc     ENDOCRINE:  Follow up FS.  Insulin protocol if needed.    OOb to chair   transfer to floor if can d/c drip       CRITICAL CARE TIME SPENT: ***

## 2020-08-02 NOTE — DISCHARGE NOTE PROVIDER - CARE PROVIDER_API CALL
Waqar Graff)  Gastroenterology; Internal Medicine  [address] [place] [id number]  Phone: [phone]  Fax: [phone]  Follow Up Time: 1 week

## 2020-08-02 NOTE — PROGRESS NOTE ADULT - SUBJECTIVE AND OBJECTIVE BOX
Gastroenterology progress note:     Patient is a 69y old  Male who presents with a chief complaint of Melena and fatigue (02 Aug 2020 09:38)       Admitted on: 07-31-20    We are following the patient for melena     Interval History: no BM, no hematemesis tolerates clear liquid. he has anal itching and discomfort thaat he attributes to hemorrhoids     PAST MEDICAL & SURGICAL HISTORY:  Hypertension  High blood cholesterol      MEDICATIONS  (STANDING):  atorvastatin 40 milliGRAM(s) Oral at bedtime  chlorhexidine 4% Liquid 1 Application(s) Topical <User Schedule>  pantoprazole Infusion 8 mG/Hr (10 mL/Hr) IV Continuous <Continuous>  polyethylene glycol 3350 17 Gram(s) Oral daily  sodium chloride 0.9%. 1000 milliLiter(s) (100 mL/Hr) IV Continuous <Continuous>    MEDICATIONS  (PRN):  melatonin 3 milliGRAM(s) Oral at bedtime PRN Sleep      Allergies  No Known Allergies      Review of Systems:   General: no fever  HEENT: no hemoptysis  Cardiovascular:  No Chest Pain, No Palpitations  Respiratory:  No Cough, No Dyspnea  Gastrointestinal:  As described in HPI  Hematology: no bruising or hematoma   Neurology: no new motor deficit  Skin: no new rash    Physical Examination:  T(C): 36.6 (08-02-20 @ 07:45), Max: 37.2 (08-02-20 @ 06:00)  HR: 70 (08-02-20 @ 10:25) (54 - 74)  BP: 151/76 (08-02-20 @ 10:25) (113/79 - 151/76)  RR: 20 (08-02-20 @ 07:45) (16 - 25)  SpO2: 96% (08-02-20 @ 07:45) (96% - 98%)      08-01-20 @ 07:01  -  08-02-20 @ 07:00  --------------------------------------------------------  IN: 1240 mL / OUT: 2050 mL / NET: -810 mL        Constitutional: No acute distress.  Head: normocephalic  Neck: no palpable thyroid  Eyes: EOMI  Respiratory:  No signs of respiratory distress. Lung sounds are clear bilaterally.  Cardiovascular:  S1 S2, Regular rate and rhythm.  Abdominal: Abdomen is soft, symmetric, and non-tender without distention.    Extremities: no pitting edema  Skin: No rashes, No Jaundice.        Data: (reviewed by attending)                        9.5    7.33  )-----------( 267      ( 02 Aug 2020 04:28 )             28.3     Hgb trend:  9.5  08-02-20 @ 04:28  8.9  08-01-20 @ 11:00  8.9  08-01-20 @ 04:21  7.6  07-31-20 @ 23:25  8.0  07-31-20 @ 16:58      07-31-20 @ 07:01  -  08-01-20 @ 07:00  --------------------------------------------------------  IN: 538 mL      08-02    141  |  106  |  13  ----------------------------<  98  4.1   |  23  |  1.1    Ca    9.0      02 Aug 2020 04:28  Mg     2.0     08-02    TPro  5.9<L>  /  Alb  3.9  /  TBili  0.6  /  DBili  x   /  AST  19  /  ALT  17  /  AlkPhos  46  08-02    Liver panel trend:  TBili 0.6   /   AST 19   /   ALT 17   /   AlkP 46   /   Tptn 5.9   /   Alb 3.9    /   DBili --      08-02  TBili 0.6   /   AST 19   /   ALT 16   /   AlkP 43   /   Tptn 5.9   /   Alb 3.9    /   DBili --      08-01  TBili 0.3   /   AST 18   /   ALT 19   /   AlkP 46   /   Tptn 6.5   /   Alb 4.4    /   DBili --      07-31      PT/INR - ( 31 Jul 2020 16:58 )   PT: 12.00 sec;   INR: 1.04 ratio         PTT - ( 31 Jul 2020 16:58 )  PTT:26.9 sec

## 2020-08-02 NOTE — DISCHARGE NOTE PROVIDER - NSDCCPCAREPLAN_GEN_ALL_CORE_FT
PRINCIPAL DISCHARGE DIAGNOSIS  Diagnosis: Upper GI bleed  Assessment and Plan of Treatment: you had GI bleed from an ulcer requiring blood transfusion. your hb is stable now. please eat only clear liquid and fu with dr. Graff as an outpatient

## 2020-08-05 DIAGNOSIS — R00.0 TACHYCARDIA, UNSPECIFIED: ICD-10-CM

## 2020-08-05 DIAGNOSIS — K91.840 POSTPROCEDURAL HEMORRHAGE OF A DIGESTIVE SYSTEM ORGAN OR STRUCTURE FOLLOWING A DIGESTIVE SYSTEM PROCEDURE: ICD-10-CM

## 2020-08-05 DIAGNOSIS — E78.5 HYPERLIPIDEMIA, UNSPECIFIED: ICD-10-CM
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
## 2020-08-05 DIAGNOSIS — K92.1 MELENA: ICD-10-CM

## 2020-08-05 DIAGNOSIS — K25.4 CHRONIC OR UNSPECIFIED GASTRIC ULCER WITH HEMORRHAGE: ICD-10-CM

## 2020-08-05 DIAGNOSIS — I10 ESSENTIAL (PRIMARY) HYPERTENSION: ICD-10-CM

## 2020-08-05 DIAGNOSIS — F17.210 NICOTINE DEPENDENCE, CIGARETTES, UNCOMPLICATED: ICD-10-CM

## 2020-08-06 RX ORDER — PANTOPRAZOLE SODIUM 20 MG/1
1 TABLET, DELAYED RELEASE ORAL
Qty: 30 | Refills: 0
Start: 2020-08-06 | End: 2020-09-04

## 2020-09-25 ENCOUNTER — OUTPATIENT (OUTPATIENT)
Dept: OUTPATIENT SERVICES | Facility: HOSPITAL | Age: 70
LOS: 1 days | Discharge: HOME | End: 2020-09-25

## 2020-09-25 ENCOUNTER — LABORATORY RESULT (OUTPATIENT)
Age: 70
End: 2020-09-25

## 2020-09-25 DIAGNOSIS — Z11.59 ENCOUNTER FOR SCREENING FOR OTHER VIRAL DISEASES: ICD-10-CM

## 2020-09-28 ENCOUNTER — OUTPATIENT (OUTPATIENT)
Dept: OUTPATIENT SERVICES | Facility: HOSPITAL | Age: 70
LOS: 1 days | Discharge: HOME | End: 2020-09-28
Payer: MEDICARE

## 2020-09-28 ENCOUNTER — TRANSCRIPTION ENCOUNTER (OUTPATIENT)
Age: 70
End: 2020-09-28

## 2020-09-28 ENCOUNTER — RESULT REVIEW (OUTPATIENT)
Age: 70
End: 2020-09-28

## 2020-09-28 VITALS
RESPIRATION RATE: 18 BRPM | OXYGEN SATURATION: 98 % | DIASTOLIC BLOOD PRESSURE: 97 MMHG | SYSTOLIC BLOOD PRESSURE: 171 MMHG | TEMPERATURE: 97 F | HEART RATE: 86 BPM

## 2020-09-28 VITALS
HEART RATE: 93 BPM | WEIGHT: 225.09 LBS | RESPIRATION RATE: 18 BRPM | HEIGHT: 65 IN | TEMPERATURE: 98 F | SYSTOLIC BLOOD PRESSURE: 164 MMHG | DIASTOLIC BLOOD PRESSURE: 97 MMHG

## 2020-09-28 PROCEDURE — 43239 EGD BIOPSY SINGLE/MULTIPLE: CPT

## 2020-09-28 PROCEDURE — 88312 SPECIAL STAINS GROUP 1: CPT | Mod: 26

## 2020-09-28 PROCEDURE — 88305 TISSUE EXAM BY PATHOLOGIST: CPT | Mod: 26

## 2020-09-28 NOTE — PRE-ANESTHESIA EVALUATION ADULT - NSANTHRISKNONERD_GEN_ALL_CORE
"Chief Complaint   Patient presents with     RECHECK     Anxiety, depression.       Initial /68 (BP Location: Left arm, Patient Position: Sitting, Cuff Size: Adult Regular)   Pulse 93   Temp 99.5  F (37.5  C) (Tympanic)   Wt 52.2 kg (115 lb)   SpO2 98%   BMI 19.74 kg/m   Estimated body mass index is 19.74 kg/m  as calculated from the following:    Height as of 3/18/19: 1.626 m (5' 4\").    Weight as of this encounter: 52.2 kg (115 lb).  Medication Reconciliation: complete    JADA CASRTO LPN    " No risk alerts present

## 2020-09-28 NOTE — CHART NOTE - NSCHARTNOTEFT_GEN_A_CORE
PACU ANESTHESIA ADMISSION NOTE      Procedure:   Post op diagnosis:      ____  Intubated  TV:______       Rate: ______      FiO2: ______    _x___  Patent Airway    _x___  Full return of protective reflexes    _x___  Full recovery from anesthesia / back to baseline status  0  Vitals:            T:                BP :  157/87              R:  20            Sat:    98           P:86      Mental Status:  _x___ Awake   _____ Alert   _____ Drowsy   _____ Sedated    Nausea/Vomiting:  _x___  NO       ______Yes,   See Post - Op Orders         Pain Scale (0-10):  __0___    Treatment: _x___ None    ____ See Post - Op/PCA Orders    Post - Operative Fluids:   __x__ Oral   ____ See Post - Op Orders    Plan: Discharge:   _x___Home       _____Floor     _____Critical Care    _____  Other:_________________    Comments:  No anesthesia issues or complications noted.  Discharge when criteria met.

## 2020-09-28 NOTE — ASU PREOP CHECKLIST - ALLERGIES REVIEWED
[facility]  Discharge Summary  Name: Amanda Richardson    MRN: [id number]  YOB: 1961    Age: 57 year old  Date of Discharge:  4/17/2019  Date of Admission: 4/10/2019  Primary Care Provider: Julius Hassan  Discharge Physician:  Jarvis Rutherford MD  Discharging Service:  Hospitalist      Discharge Diagnoses:  Sepsis  Possible pneumonia  Possible UTI  Urinary retention  Chronic venous stasis, lymphedema  Sacral, decubitus ulcer  Multiple sclerosis  Chronic pain syndrome  Type II DM  HTN  Probable obesity hypoventilation syndrome  Chronic constipation  History of femur fracture facial  Chronic anemia     Hospital Course:  Summary of Stay: Amanda Richardson is a 57 year old female with past medical history significant for MS, chronic pain syndrome with pain pump in place, chronic decubitus and inguinal ulcers/wounds, type II DM, urinary retention, obesity, likely obesity hypoventilation syndrome, constipation, HTN, anemia, right femur fracture, and chronic leukocytosis with recent admission for urosepsis due to a ureteral stone who is now admitted on 4/10/2019 with fever and leukocytosis consistent with sepsis of unclear etiology.  Started on Zosyn empirically and ID was consulted.  Blood cultures no growth today.  Urine culture growing yeast so IV fluconazole was added.  General surgery was consulted to evaluate her decubitus ulcers which show some necrotic tissue, but did not seem to be the source of infection.  Has undergone local debridement of these ulcers while here.  Having urinary retention and a Bobo catheter was placed for this and for ongoing wound cares.  Resumed her Lasix for lower extremity edema.  PT and OT consulted, TCU when placement found.     Problem List/Assessment and Plan:   Sepsis, possible pneumonia, possible UTI: Multiple potential sources of infection. WBC to 20K associated with malaise, URI symptoms, increased LE edema and redness. Lactate is normal and BP is  stable. CXR with possible infiltrate; her symptoms are more consistent with URI than pneumonia. She also has a chronic decubitus ulcer which could be a source although does not look infected per surgery.  LE edema and redness more likely venous stasis than cellulitis, although all potential sources.  Urine culture growing yeast.  She does have a chronic leukocytosis so unclear that this will ever normalize.  Intermittent low grade fevers.  Cough now productive of more sputum that she says is green.  -ID consulted  -Blood cultures NGTD  -Urine culture growing yeast  -Continue IV Piperacillin/Tazobactam and IV fluconazole, on discharge change to Augmentin 875 mg twice daily and p.o. fluconazole 200 mg daily to finish on 4/22     Urinary retention: Patient reports having ramos intermittently in the past. Having high PVR and requiring straight cath. Given her significant skin issues and urinary retention, will place ramos for now.    -Keep Ramos in place for 1-2 weeks until more mobile and voiding removal with voiding trial or follow-up with urology during this time, defer to nursing home physician     Chronic venous stasis, lymphedema: At risk for cellulitis slight pink color to her legs, but they do not appear infected at present.  Does have bilateral 1+ lower extremity edema.  Reports negative DVT US at TCU within the past few days  -Resumed Lasix 40 mg p.o. Daily      Left sacral decubitus ulcer, present on admission  -WOC in general surgery consulted consulted  -Pressure offloading intermittently as much as possible  -asked surgery to see, patient had some small areas of necrotic tissue that has been debrided multiple times here, although felt less likely to be the source of infection   -Follow-up in wound clinic  -Wound care regimen while in hospital: clean wound with wound spray, wipe out any dead tissue with gauze or q-tip, place Dakins moistened gauze into the wound and pack to edges of wound but not on skin,  place ABD pad or gauze on top.        Multiple sclerosis: baclofen pump in situ, reports refilled within the past 2 weeks prior to admission.  -Continue PTA PO baclofen and baclofen pump     Chronic pain syndrome: continue PTA neurontin, PRN oxycodone, amitriptyline, acetaminophen.  -Patient reports that she has been on oxycodone 15 mg q6h prn for many years although recently it was decreased in half very recently at SNF. She is quiet concerned about this and reports uncontrolled pain, wants to have her regular dose.  Increased back to baseline dose and so far no worsening somnolence and pain level is adequate.  Discharged on 15 mg dosing.  Bowel regimen.     Diabetes mellitus type 2:  controlled on PO meds.  Resume metformin.       HTN: Blood pressure 100-120 systolic while here.  -Resumed metoprolol XL 50 mg daily  -Resumed Lasix 40 mg p.o. Daily   -Resumed losartan 50 mg daily  -Held HCTZ for now for lower pressure, can resume if blood pressure going up at TCU     Probable obesity hypoventilation: Monitor oxygenation and signs of oversedation.  Intermittent mild hypoxia when sleeping needing 1 L O2 occasionally, wean as able     Chronic constipation: miralax,senna as per PTA regimen     History of right femur fracture: Incidentally found to have a previous right femur fracture on admission 2 months ago.  Does have some chronic right hip pain.     HLD: Continue atorvastatin.     Chronic anemia: Hemoglobin at baseline of 8-10.    Physical deconditioning: PT and OT at TCU.  If not improving in time may need long-term care.         Discharge Disposition:  Discharged to rehabilitation facility     Allergies:  Allergies   Allergen Reactions     Lisinopril      Lip swelling     Cyclobenzaprine Other (See Comments)     Per 4-10-17 H&P by Yanna Dugan PA-C.     Flexeril [Cyclobenzaprine Hcl]      Got confused         Discharge Medications:   Current Discharge Medication List      START taking these medications    Details    acetaminophen (TYLENOL) 325 MG tablet Take 2 tablets (650 mg) by mouth every 4 hours as needed for mild pain    Associated Diagnoses: Chronic pain syndrome      amoxicillin-clavulanate (AUGMENTIN) 875-125 MG tablet Take 1 tablet by mouth 2 times daily for 5 days    Associated Diagnoses: Sepsis, due to unspecified organism (H)      fluconazole (DIFLUCAN) 200 MG tablet Take 1 tablet (200 mg) by mouth daily for 7 days    Associated Diagnoses: Urinary tract infection with hematuria, site unspecified      sodium hypochlorite (DAKINS) 0.4-0.5 % external solution Apply topically 2 times daily To left buttocks wound    Associated Diagnoses: Pressure injury of skin of sacral region, unspecified injury stage         CONTINUE these medications which have CHANGED    Details   oxyCODONE (ROXICODONE) 5 MG tablet Take 3 tablets (15 mg) by mouth every 6 hours as needed for severe pain  Qty: 30 tablet, Refills: 0    Associated Diagnoses: Chronic pain syndrome         CONTINUE these medications which have NOT CHANGED    Details   amitriptyline (ELAVIL) 100 MG tablet Take 1 tablet (100 mg) by mouth At Bedtime  Qty: 90 tablet, Refills: 3    Associated Diagnoses: Lumbar radiculopathy; Chronic pain syndrome; MS (multiple sclerosis) (H); Moderate depressive episode (H)      atorvastatin (LIPITOR) 10 MG tablet Take 1 tablet (10 mg) by mouth daily  Qty: 90 tablet, Refills: 3    Associated Diagnoses: Hyperlipidemia LDL goal <70      !! baclofen (LIORESAL) 10 MG tablet Take 10 mg by mouth 3 times daily      cholecalciferol 1000 units TABS Take 1,000 Units by mouth daily      furosemide (LASIX) 40 MG tablet Take 40 mg by mouth daily      gabapentin (NEURONTIN) 300 MG capsule Take 600 mg by mouth 3 times daily      guaiFENesin (MUCINEX) 600 MG 12 hr tablet Take 1 tablet (600 mg) by mouth 2 times daily    Associated Diagnoses: Cough      hydrochlorothiazide (MICROZIDE) 12.5 MG capsule Take 12.5 mg by mouth daily      ipratropium - albuterol  0.5 mg/2.5 mg/3 mL (DUONEB) 0.5-2.5 (3) MG/3ML neb solution Take 1 vial (3 mLs) by nebulization 4 times daily    Associated Diagnoses: Cough      losartan (COZAAR) 50 MG tablet Take 1 tablet (50 mg) by mouth daily  Qty: 30 tablet, Refills: 0    Associated Diagnoses: Hypertension goal BP (blood pressure) < 140/90      metFORMIN (GLUCOPHAGE) 500 MG tablet Take 1 tablet (500 mg) by mouth daily (with dinner)  Qty: 90 tablet, Refills: 3    Associated Diagnoses: Type 2 diabetes mellitus with stage 1 chronic kidney disease, without long-term current use of insulin (H)      metoprolol succinate (TOPROL-XL) 50 MG 24 hr tablet TAKE 1 TABLET BY MOUTH ONCE DAILY  Qty: 90 tablet, Refills: 1    Associated Diagnoses: Hypertension goal BP (blood pressure) < 140/90      Multiple Vitamin (MULTI-VITAMIN PO) Take 1 tablet by mouth daily       omega-3 fatty acids (FISH OIL) 1200 MG capsule Take 1 capsule by mouth daily.      !! polyethylene glycol (MIRALAX/GLYCOLAX) packet Take 17 g by mouth daily      POTASSIUM CHLORIDE ER PO Take 20 mEq by mouth daily      senna-docusate (SENOKOT-S/PERICOLACE) 8.6-50 MG tablet Take 3 tablets by mouth 2 times daily  Qty: 180 tablet, Refills: 0    Associated Diagnoses: MS (multiple sclerosis) (H)      !! baclofen (LIORESAL) 10 MG tablet Take 10 mg by mouth daily as needed for muscle spasms      baclofen (LIORESAL) intraTHECAL Internal Pump by Intrathecal route continuous prn 4/11- Managed by [facility], spoke to Jolene at [phone]  Pump contains baclofen 2000mcg/ml  Alarm date: 6/18/19  Dose-434.8 mcg/day  Reservoir contains 2ml after the alarm date.      bisacodyl (DULCOLAX) 10 MG suppository Place 1 suppository (10 mg) rectally daily as needed for constipation    Associated Diagnoses: MS (multiple sclerosis) (H)      docusate sodium (COLACE) 100 MG capsule Take 100 mg by mouth 2 times daily as needed for constipation      !! polyethylene glycol (MIRALAX/GLYCOLAX) packet Take 1  packet by mouth 2 times daily as needed for constipation       !! - Potential duplicate medications found. Please discuss with provider.           Condition on Discharge:  Discharge condition: Stable   Discharge vitals: Blood pressure 134/60, pulse 86, temperature 97.3  F (36.3  C), temperature source Oral, resp. rate 20, weight 114.4 kg (252 lb 3.2 oz), last menstrual period 12/11/2011, SpO2 94 %, not currently breastfeeding.   Code status on discharge: Full Code     History of Illness:  See detailed admission note for full details.    Physical Exam:  Blood pressure 134/60, pulse 86, temperature 97.3  F (36.3  C), temperature source Oral, resp. rate 20, weight 114.4 kg (252 lb 3.2 oz), last menstrual period 12/11/2011, SpO2 94 %, not currently breastfeeding.  Wt Readings from Last 1 Encounters:   04/14/19 114.4 kg (252 lb 3.2 oz)     Constitutional: Awake, NAD   Eyes: sclera white   HEENT:  MMM  Respiratory: Crackles at bases.  No wheeze  Cardiovascular: RRR.  No murmur   GI:  Obese, non-tender, not distended, bowel sounds present  Skin: Wrinkling skin lower extremities, pink area to bilateral legs well within pen marking.  L decubitus ulcer  Musculoskeletal/extremities: 1+ edema bilateral lower extremities  Neurologic: A&O  Psychiatric: calm, cooperative, flat affect    Procedures other than Imaging:  Bobo catheter placement  Bedside debridement of pressure ulcer, wound     Imaging:  Results for orders placed or performed during the hospital encounter of 04/10/19   Chest XR,  PA & LAT    Narrative    XR CHEST 2 VW   4/10/2019 7:29 PM     HISTORY: cough    COMPARISON: Film dated 3/17/2019    FINDINGS: Cardiac silhouette is enlarged..  There is a small patchy  opacity at the right lung base. This would be compatible with a small  area of infiltrate or atelectasis. The upper lung zones are clear. The  pulmonary vasculature is normal.  The bones and soft tissues are  unremarkable.      Impression    IMPRESSION:   1.  Mildly prominent cardiac silhouette.  2. Small opacity at the right lung base compatible with a small area  of infiltrate or atelectasis.        MURTAZA FREITAS MD        Consultations:  Consultation during this admission received from infectious disease and surgery.       Recent Lab Results:  Recent Labs   Lab 04/16/19  0750 04/15/19  0716 04/14/19  0715   WBC 18.1* 16.2* 15.0*   HGB 9.5* 9.0* 8.7*   HCT 31.5* 29.8* 28.7*   MCV 87 87 88    354 319     Recent Labs   Lab 04/16/19  0750 04/15/19  0716 04/14/19  0715    139 137   POTASSIUM 3.9 3.6 3.6   CHLORIDE 102 103 103   CO2 32 30 34*   ANIONGAP 5 6 <1*   * 115* 109*   BUN 14 13 12   CR 0.58 0.57 0.55   GFRESTIMATED >90 >90 >90   GFRESTBLACK >90 >90 >90   MARY 8.4* 8.4* 8.1*     Recent Labs   Lab 04/10/19  1913 04/10/19  1855 04/10/19  1707   CULT No growth No growth 10,000 to 50,000 colonies/mL  Candida albicans / dubliniensis  Candida albicans and Candida dubliniensis are not routinely speciated  Susceptibility testing not routinely done  *  <1000 colonies/mL  mixed urogenital geovanna  Susceptibility testing not routinely done            Pending Results:    Unresulted Labs Ordered in the Past 30 Days of this Admission     No orders found from 2/9/2019 to 4/11/2019.         These results will be followed up by patient's primary care provider.    Discharge Instructions and Follow-Up:   Discharge Procedure Orders   General info for SNF   Order Comments: Length of Stay Estimate: Short Term Care: Estimated # of Days 31-90  Condition at Discharge: Stable  Level of care:skilled   Rehabilitation Potential: Fair  Admission H&P remains valid and up-to-date: Yes  Recent Chemotherapy: N/A  Use Nursing Home Standing Orders: Yes     Mantoux instructions   Order Comments: Give two-step Mantoux (PPD) Per Facility Policy Yes     Reason for your hospital stay   Order Comments: You were hospitalized for an infection that may have been from pneumonia or from urinary  tract infection.  This improved with IV antibiotics and you will complete a course of Augmentin and fluconazole at the TCU.  You will have ongoing PT and OT.  Wound care will be very important along with follow-up in the wound care clinic.  You had urinary retention and a Bobo catheter is left in place.  This should be removed in 1-2 weeks for a voiding trial or a urology follow-up in clinic during this time.     Glucose monitor nursing POCT   Order Comments: Before meals and at bedtime     Daily weights   Order Comments: Call Provider for weight gain of more than 2 pounds per day or 5 pounds per week.     Bobo catheter   Order Comments: To straight gravity drainage.   Recommend Bobo catheter removal in 1-2 weeks for a voiding trial for retention or alternatively follow-up in urology clinic at this time     Follow Up and recommended labs and tests   Order Comments: Follow up with Nursing home physician.  No follow up labs or test are needed.    Recommend Bobo catheter removal in 1-2 weeks for a voiding trial for retention or alternatively follow-up in urology clinic at this time    Follow-up in wound care clinic     Wound care (specify)   Order Comments: Site:   Left buttocks  Instructions:    Clean wound with wound spray, wipe out any dead tissue with gauze or q-tip, place Dakins moistened gauze into the wound and pack to edges of wound but not on skin, place ABD pad or gauze on top.    Pressure offload intermittently throughout the day as much as possible to endorse wound healing     Activity - Up with nursing assistance     Order Specific Question Answer Comments   Is discharge order? Yes      Full Code     Order Specific Question Answer Comments   Code status determined by: Discussion with patient/legal decision maker      Physical Therapy Adult Consult   Order Comments: Evaluate and treat as clinically indicated.    Reason: Physical deconditioning, multiple sclerosis     Occupational Therapy Adult Consult    Order Comments: Evaluate and treat as clinically indicated.    Reason:  Physical deconditioning, multiple sclerosis     Oxygen - Nasal cannula   Order Comments: Intermittently needing 1 Lpm by nasal cannula to keep O2 sats 92% or greater while sleeping.  Wean as able.  Encourage incentive spirometer     Advance Diet as Tolerated   Order Comments: Follow this diet upon discharge: Orders Placed This Encounter      Snacks/Supplements Adult: Boost Glucose Control; Between Meals      Combination Diet Regular Diet Adult; 2686-7250 Calories: Moderate Consistent CHO (4-6 CHO units/meal)     Order Specific Question Answer Comments   Is discharge order? Yes          I, Jarvis Rutherford, personally saw the patient today and spent greater than 30 minutes discharging this patient.    Jarvis Rutherford MD     done

## 2020-09-28 NOTE — ASU DISCHARGE PLAN (ADULT/PEDIATRIC) - CALL YOUR DOCTOR IF YOU HAVE ANY OF THE FOLLOWING:
Bleeding that does not stop/Nausea and vomiting that does not stop/Fever greater than (need to indicate Fahrenheit or Celsius)/Unable to urinate/Inability to tolerate liquids or foods/Numbness, tingling, color or temperature change to extremity/Increased irritability or sluggishness/Swelling that gets worse/Excessive diarrhea

## 2020-09-30 DIAGNOSIS — E78.00 PURE HYPERCHOLESTEROLEMIA, UNSPECIFIED: ICD-10-CM

## 2020-09-30 DIAGNOSIS — K29.50 UNSPECIFIED CHRONIC GASTRITIS WITHOUT BLEEDING: ICD-10-CM

## 2020-09-30 DIAGNOSIS — I10 ESSENTIAL (PRIMARY) HYPERTENSION: ICD-10-CM

## 2020-09-30 DIAGNOSIS — K20.9 ESOPHAGITIS, UNSPECIFIED: ICD-10-CM

## 2020-09-30 DIAGNOSIS — K25.7 CHRONIC GASTRIC ULCER WITHOUT HEMORRHAGE OR PERFORATION: ICD-10-CM

## 2020-09-30 LAB — SURGICAL PATHOLOGY STUDY: SIGNIFICANT CHANGE UP

## 2020-12-16 ENCOUNTER — APPOINTMENT (OUTPATIENT)
Dept: GASTROENTEROLOGY | Facility: CLINIC | Age: 70
End: 2020-12-16

## 2021-04-20 NOTE — ASU PATIENT PROFILE, ADULT - MEDICATION ADMINISTRATION INFO, PROFILE
[facility] (wife on hippa) stated that she thought that Dr. Dwain Glover was going to do a MRI or Xrays of pt's lower back.     Pl advise [phone] no concerns

## 2021-08-18 NOTE — ED PROVIDER NOTE - MUSCULOSKELETAL, MLM
Additional Anesthesia Volume In Cc (Will Not Render If 0): 0 Spine appears normal, range of motion is not limited, no muscle or joint tenderness

## 2021-09-16 NOTE — ASU PATIENT PROFILE, ADULT - NS PRO PT RIGHT SUPPORT PERSON
Please follow-up with your primary care physician tomorrow and in 2 days for a wound check.  Follow-up tomorrow for a repeat blood pressure check and reevaluation of blood pressure medications as needed.  Return to the ER sooner if severe headache or numbness or weakness or vision changes or speech issues or any concerns or new symptoms.   Yes

## 2021-10-13 ENCOUNTER — TRANSCRIPTION ENCOUNTER (OUTPATIENT)
Age: 71
End: 2021-10-13

## 2021-10-13 ENCOUNTER — INPATIENT (INPATIENT)
Facility: HOSPITAL | Age: 71
LOS: 12 days | Discharge: NURSING, REHAB OR LONG-TERM CARE | End: 2021-10-26
Attending: HOSPITALIST | Admitting: HOSPITALIST
Payer: MEDICARE

## 2021-10-13 VITALS
OXYGEN SATURATION: 98 % | SYSTOLIC BLOOD PRESSURE: 193 MMHG | RESPIRATION RATE: 18 BRPM | HEIGHT: 65 IN | WEIGHT: 199.96 LBS | HEART RATE: 85 BPM | TEMPERATURE: 97 F | DIASTOLIC BLOOD PRESSURE: 96 MMHG

## 2021-10-13 DIAGNOSIS — K56.699 OTHER INTESTINAL OBSTRUCTION UNSPECIFIED AS TO PARTIAL VERSUS COMPLETE OBSTRUCTION: ICD-10-CM

## 2021-10-13 DIAGNOSIS — E86.1 HYPOVOLEMIA: ICD-10-CM

## 2021-10-13 DIAGNOSIS — S72.145A NONDISPLACED INTERTROCHANTERIC FRACTURE OF LEFT FEMUR, INITIAL ENCOUNTER FOR CLOSED FRACTURE: ICD-10-CM

## 2021-10-13 DIAGNOSIS — I12.9 HYPERTENSIVE CHRONIC KIDNEY DISEASE WITH STAGE 1 THROUGH STAGE 4 CHRONIC KIDNEY DISEASE, OR UNSPECIFIED CHRONIC KIDNEY DISEASE: ICD-10-CM

## 2021-10-13 DIAGNOSIS — S93.401A SPRAIN OF UNSPECIFIED LIGAMENT OF RIGHT ANKLE, INITIAL ENCOUNTER: ICD-10-CM

## 2021-10-13 DIAGNOSIS — R63.8 OTHER SYMPTOMS AND SIGNS CONCERNING FOOD AND FLUID INTAKE: ICD-10-CM

## 2021-10-13 DIAGNOSIS — M25.552 PAIN IN LEFT HIP: ICD-10-CM
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
## 2021-10-13 DIAGNOSIS — Z92.22 PERSONAL HISTORY OF MONOCLONAL DRUG THERAPY: ICD-10-CM

## 2021-10-13 DIAGNOSIS — F17.200 NICOTINE DEPENDENCE, UNSPECIFIED, UNCOMPLICATED: ICD-10-CM

## 2021-10-13 DIAGNOSIS — R40.2410 GLASGOW COMA SCALE SCORE 13-15, UNSPECIFIED TIME: ICD-10-CM

## 2021-10-13 DIAGNOSIS — N17.9 ACUTE KIDNEY FAILURE, UNSPECIFIED: ICD-10-CM
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
## 2021-10-13 DIAGNOSIS — R50.9 FEVER, UNSPECIFIED: ICD-10-CM

## 2021-10-13 DIAGNOSIS — J98.11 ATELECTASIS: ICD-10-CM

## 2021-10-13 DIAGNOSIS — M25.511 PAIN IN RIGHT SHOULDER: ICD-10-CM

## 2021-10-13 DIAGNOSIS — K21.9 GASTRO-ESOPHAGEAL REFLUX DISEASE WITHOUT ESOPHAGITIS: ICD-10-CM

## 2021-10-13 DIAGNOSIS — S54.21XA INJURY OF RADIAL NERVE AT FOREARM LEVEL, RIGHT ARM, INITIAL ENCOUNTER: ICD-10-CM

## 2021-10-13 DIAGNOSIS — E78.5 HYPERLIPIDEMIA, UNSPECIFIED: ICD-10-CM

## 2021-10-13 DIAGNOSIS — R26.2 DIFFICULTY IN WALKING, NOT ELSEWHERE CLASSIFIED: ICD-10-CM

## 2021-10-13 DIAGNOSIS — N18.30 CHRONIC KIDNEY DISEASE, STAGE 3 UNSPECIFIED: ICD-10-CM
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
## 2021-10-13 DIAGNOSIS — T40.605A ADVERSE EFFECT OF UNSPECIFIED NARCOTICS, INITIAL ENCOUNTER: ICD-10-CM

## 2021-10-13 DIAGNOSIS — T14.8XXA OTHER INJURY OF UNSPECIFIED BODY REGION, INITIAL ENCOUNTER: ICD-10-CM
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
## 2021-10-13 LAB
ALBUMIN SERPL ELPH-MCNC: 4.4 G/DL — SIGNIFICANT CHANGE UP (ref 3.5–5.2)
ALP SERPL-CCNC: 98 U/L — SIGNIFICANT CHANGE UP (ref 30–115)
ALT FLD-CCNC: 24 U/L — SIGNIFICANT CHANGE UP (ref 0–41)
ANION GAP SERPL CALC-SCNC: 13 MMOL/L — SIGNIFICANT CHANGE UP (ref 7–14)
AST SERPL-CCNC: 45 U/L — HIGH (ref 0–41)
BASOPHILS # BLD AUTO: 0.07 K/UL — SIGNIFICANT CHANGE UP (ref 0–0.2)
BASOPHILS NFR BLD AUTO: 0.6 % — SIGNIFICANT CHANGE UP (ref 0–1)
BILIRUB SERPL-MCNC: 0.3 MG/DL — SIGNIFICANT CHANGE UP (ref 0.2–1.2)
BUN SERPL-MCNC: 37 MG/DL — HIGH (ref 10–20)
CALCIUM SERPL-MCNC: 9.6 MG/DL — SIGNIFICANT CHANGE UP (ref 8.5–10.1)
CHLORIDE SERPL-SCNC: 103 MMOL/L — SIGNIFICANT CHANGE UP (ref 98–110)
CO2 SERPL-SCNC: 22 MMOL/L — SIGNIFICANT CHANGE UP (ref 17–32)
CREAT SERPL-MCNC: 1.3 MG/DL — SIGNIFICANT CHANGE UP (ref 0.7–1.5)
EOSINOPHIL # BLD AUTO: 0.14 K/UL — SIGNIFICANT CHANGE UP (ref 0–0.7)
EOSINOPHIL NFR BLD AUTO: 1.1 % — SIGNIFICANT CHANGE UP (ref 0–8)
GLUCOSE SERPL-MCNC: 105 MG/DL — HIGH (ref 70–99)
HCT VFR BLD CALC: 38.9 % — LOW (ref 42–52)
HGB BLD-MCNC: 13.3 G/DL — LOW (ref 14–18)
IMM GRANULOCYTES NFR BLD AUTO: 0.3 % — SIGNIFICANT CHANGE UP (ref 0.1–0.3)
LYMPHOCYTES # BLD AUTO: 1.73 K/UL — SIGNIFICANT CHANGE UP (ref 1.2–3.4)
LYMPHOCYTES # BLD AUTO: 13.9 % — LOW (ref 20.5–51.1)
MCHC RBC-ENTMCNC: 30 PG — SIGNIFICANT CHANGE UP (ref 27–31)
MCHC RBC-ENTMCNC: 34.2 G/DL — SIGNIFICANT CHANGE UP (ref 32–37)
MCV RBC AUTO: 87.8 FL — SIGNIFICANT CHANGE UP (ref 80–94)
MONOCYTES # BLD AUTO: 1.01 K/UL — HIGH (ref 0.1–0.6)
MONOCYTES NFR BLD AUTO: 8.1 % — SIGNIFICANT CHANGE UP (ref 1.7–9.3)
NEUTROPHILS # BLD AUTO: 9.44 K/UL — HIGH (ref 1.4–6.5)
NEUTROPHILS NFR BLD AUTO: 76 % — HIGH (ref 42.2–75.2)
NRBC # BLD: 0 /100 WBCS — SIGNIFICANT CHANGE UP (ref 0–0)
PLATELET # BLD AUTO: 250 K/UL — SIGNIFICANT CHANGE UP (ref 130–400)
POTASSIUM SERPL-MCNC: 4.8 MMOL/L — SIGNIFICANT CHANGE UP (ref 3.5–5)
POTASSIUM SERPL-SCNC: 4.8 MMOL/L — SIGNIFICANT CHANGE UP (ref 3.5–5)
PROT SERPL-MCNC: 7.5 G/DL — SIGNIFICANT CHANGE UP (ref 6–8)
RBC # BLD: 4.43 M/UL — LOW (ref 4.7–6.1)
RBC # FLD: 13.2 % — SIGNIFICANT CHANGE UP (ref 11.5–14.5)
SARS-COV-2 RNA SPEC QL NAA+PROBE: SIGNIFICANT CHANGE UP
SODIUM SERPL-SCNC: 138 MMOL/L — SIGNIFICANT CHANGE UP (ref 135–146)
WBC # BLD: 12.43 K/UL — HIGH (ref 4.8–10.8)
WBC # FLD AUTO: 12.43 K/UL — HIGH (ref 4.8–10.8)

## 2021-10-13 PROCEDURE — 72125 CT NECK SPINE W/O DYE: CPT | Mod: 26,MA

## 2021-10-13 PROCEDURE — 73552 X-RAY EXAM OF FEMUR 2/>: CPT | Mod: 26,LT

## 2021-10-13 PROCEDURE — 72170 X-RAY EXAM OF PELVIS: CPT | Mod: 26,59

## 2021-10-13 PROCEDURE — 99223 1ST HOSP IP/OBS HIGH 75: CPT | Mod: AI

## 2021-10-13 PROCEDURE — 73030 X-RAY EXAM OF SHOULDER: CPT | Mod: 26,RT

## 2021-10-13 PROCEDURE — 73700 CT LOWER EXTREMITY W/O DYE: CPT | Mod: 26,LT,MA

## 2021-10-13 PROCEDURE — 73502 X-RAY EXAM HIP UNI 2-3 VIEWS: CPT | Mod: 26,LT

## 2021-10-13 PROCEDURE — 99285 EMERGENCY DEPT VISIT HI MDM: CPT

## 2021-10-13 PROCEDURE — 70450 CT HEAD/BRAIN W/O DYE: CPT | Mod: 26,MA

## 2021-10-13 RX ORDER — OXYCODONE AND ACETAMINOPHEN 5; 325 MG/1; MG/1
1 TABLET ORAL ONCE
Refills: 0 | Status: DISCONTINUED | OUTPATIENT
Start: 2021-10-13 | End: 2021-10-13

## 2021-10-13 RX ORDER — OXYCODONE AND ACETAMINOPHEN 5; 325 MG/1; MG/1
1 TABLET ORAL EVERY 4 HOURS
Refills: 0 | Status: DISCONTINUED | OUTPATIENT
Start: 2021-10-13 | End: 2021-10-19

## 2021-10-13 RX ADMIN — OXYCODONE AND ACETAMINOPHEN 1 TABLET(S): 5; 325 TABLET ORAL at 22:28

## 2021-10-13 RX ADMIN — OXYCODONE AND ACETAMINOPHEN 1 TABLET(S): 5; 325 TABLET ORAL at 18:20

## 2021-10-13 RX ADMIN — OXYCODONE AND ACETAMINOPHEN 1 TABLET(S): 5; 325 TABLET ORAL at 14:17

## 2021-10-13 NOTE — H&P ADULT - ASSESSMENT
70 y/o M PMHx HTN and HLD BIBA present to the Ed today s/p fall   72 y/o M PMHx HTN and HLD BIBA present to the Ed today s/p fall. Found to have nondisplaced left hip fracture    #Mechanical fall complicated by Left hip fracture  CT Left hip Nondisplaced fracture of the left greater trochanter.  Rest of trauma workup has been negative  - Per ortho, will obtain MR Left hip noncontrast  - Pain control  - NWB for now until ortho eval. Will get PT after ortho evaluates patient    #HTN/HLD  - Cont amlodipine 10mg qD  - Cont atorvastatin 80mg qD    DVT PPX, heparin SC  70 y/o M PMHx HTN and HLD BIBA present to the Ed today s/p fall. Found to have nondisplaced left hip fracture    #Mechanical fall complicated by Left hip fracture  CT Left hip Nondisplaced fracture of the left greater trochanter.  Rest of trauma workup has been negative  - Per ortho, will obtain MR Left hip noncontrast  - Pain control  - NWB for now until ortho eval. Will get PT after ortho evaluates patient    #HTN/HLD  - Cont amlodipine 10mg qD  - Cont atorvastatin 80mg qD    #CKD Stage 2-3  Cr has been stable when reviwed with previous admissions  - Cont to monitor    DVT PPX, heparin SC

## 2021-10-13 NOTE — ED ADULT NURSE REASSESSMENT NOTE - NS ED NURSE REASSESS COMMENT FT1
IV insertion accidentally documented on this chart for another patient.  Info deleted immediately after saving it .

## 2021-10-13 NOTE — H&P ADULT - HISTORY OF PRESENT ILLNESS
70 y/o M PMHx HTN and HLD BIBA present to the Ed today s/p fall down 6 concrete steps in front of his house at 11am. Denies any LOC prior to event, fall was mechanical. Was helped up by daughter, but came to ED due to ongoing pain. Found to have left hip nondisplaced fracture. Denies chest pain, dyspnea, nausea/vomiting/abdominal pain, diarrhea,dysuria, recent fevers/chills.

## 2021-10-13 NOTE — ED PROVIDER NOTE - OBJECTIVE STATEMENT
72 y/o M PMHx HTN and HLD BIBA present to the Ed today s/p fall down 6 concrete steps in front of his house at 11am. pt states he tried to break his fall and "tumbled" down the stairs. he admits to hitting his head on ground but denies any LOC. pt states he was not able to get himself up at scene and was helped into chair by his daughter.  pt is complaining of left hip pain and right shoulder pain which is why he was came in. pt denies any history of blood thinners, headache, dizziness.

## 2021-10-13 NOTE — ED PROVIDER NOTE - CLINICAL SUMMARY MEDICAL DECISION MAKING FREE TEXT BOX
Pt s/p fall with hip and shoulder pain.  Head and neck CT obtained.  x rays obtained and reported and no fracture however pt unable to bear weight and ambulate so CT obtained which revealed non displaced fracture of the greater trochanter.  I spoke with SHREYA Kirk of Ortho who reviewed images.  Ortho requests pt to have MRI non contrast of the hip to check for intertroch extension. Pt can remain south.  Pt will also require PT eval for assessment of ambulation with proper device as he also has injury to shoulder.  Pt is aware of the plan.  Currently refusing IV insertion and requesting oral pain meds.

## 2021-10-13 NOTE — ED PROVIDER NOTE - NS ED ROS FT
Constitutional: (-) fever, last tetanus was a year ago  Eyes/ENT: (-) blurry vision, (-) epistaxis  Cardiovascular: (-) chest pain, (-) syncope  Respiratory: (-) cough, (-) shortness of breath  Gastrointestinal: (-) vomiting, (-) diarrhea  Musculoskeletal: (-) neck pain, (-) back pain, (-) joint pain  Integumentary: (-) rash, (-) edema  Neurological: (-) headache, (-) altered mental status  Psychiatric: (-) hallucinations  Allergic/Immunologic: (-) pruritus

## 2021-10-13 NOTE — ED PROVIDER NOTE - PHYSICAL EXAMINATION
Physical Exam    Vital Signs: I have reviewed the initial vital signs.  Constitutional: well-nourished, appears stated age, no acute distress,   Eyes: Conjunctiva pink, Sclera clear, PERRLA, EOMI.  Cardiovascular: S1 and S2, regular rate, regular rhythm, well-perfused extremities, radial pulses equal and 2+  Respiratory: unlabored respiratory effort, clear to auscultation bilaterally no wheezing, rales and rhonchi  Gastrointestinal: soft, non-tender abdomen, no pulsatile mass, normal bowl sounds, no CVA tenderness  Musculoskeletal: supple neck, no lower extremity edema, legs equal in length,  no midline tenderness, no cervical neck tenderness  Integumentary: warm, dry, multiple abrasions on extremities and bridge of nose, no active bleeding, no signs of trauma to left hip or right shoulder   Neurologic: awake, alert, cranial nerves II-XII grossly intact, extremities’ motor and sensory functions grossly intact  Psychiatric: appropriate mood, appropriate affect

## 2021-10-13 NOTE — ED PROVIDER NOTE - NS ED MD DISPO ADMITTING SERVICE
Called number on file, no answer    LVM    Need to inform her of Dr. Patel message   Please call mother with normal electrolytes, mild anemia on CBC and mildly low Vit D level- I do recommend a multivitamin with iron- the vitamin should have vit D in it- also encourage high iron foods and foods with vitamin D/calcium   Zinc and magneisum levels are pending- will call with results once available   
MED

## 2021-10-13 NOTE — ED ADULT TRIAGE NOTE - CHIEF COMPLAINT QUOTE
pt biba, fell down aprox 6 outdoor concrete steps, no loc, c/o right shoulder pain , ems state pt initially has nose bleed, no active bleeding at this time. pt biba, fell down aprox 6 outdoor concrete steps, no loc, c/o right shoulder pain ,  left hip pain ,ems state pt initially has nose bleed, no active bleeding at this time.

## 2021-10-13 NOTE — ED PROVIDER NOTE - PROGRESS NOTE DETAILS
Spoke with pt. discussed imaging and tried to have pt ambulate. Pt unable to ambulate ordered ct and labs for further eval.

## 2021-10-13 NOTE — ED ADULT NURSE NOTE - NSICDXPASTMEDICALHX_GEN_ALL_CORE_FT
PAST MEDICAL HISTORY:  GERD (gastroesophageal reflux disease)     High blood cholesterol     Hypertension

## 2021-10-13 NOTE — ED PROVIDER NOTE - CARE PLAN
1 Principal Discharge DX:	Hip fracture  Secondary Diagnosis:	Right shoulder pain  Secondary Diagnosis:	Abrasion

## 2021-10-13 NOTE — ED ADULT NURSE NOTE - CHIEF COMPLAINT QUOTE
pt biba, fell down aprox 6 outdoor concrete steps, no loc, c/o right shoulder pain ,  left hip pain ,ems state pt initially has nose bleed, no active bleeding at this time.

## 2021-10-13 NOTE — H&P ADULT - NSHPPHYSICALEXAM_GEN_ALL_CORE
PHYSICAL EXAM:  GENERAL: NAD, well-developed  HEAD:  Atraumatic, Normocephalic  EYES: EOMI, PERRLA, conjunctiva and sclera clear  NECK: Supple, No JVD  CHEST/LUNG: Clear to auscultation bilaterally; No wheeze  HEART: Regular rate and rhythm; No murmurs, rubs, or gallops  ABDOMEN: Soft, Nontender, Nondistended; Bowel sounds present  EXTREMITIES:  2+ Peripheral Pulses, No clubbing, cyanosis, or edema, ROM of left hip limited due to pain, tender to palpation  PSYCH: AAOx3  SKIN: No rashes or lesions

## 2021-10-13 NOTE — ED PROVIDER NOTE - ATTENDING CONTRIBUTION TO CARE
70 yo M PMHx HTN, GERD, presents with pain to left hip and shoulder s/p slip and fall down his front steps this am.  Pt states that he tried to break his fall with his arm.  Did hit head and had bleeding from nose which resolved.  no LOC.  Pt states that he needed help to get up secondary to the left hip pain, no numbness or tingling.  Tetanus is UTD.  On exam pt iN NAD AAO x 3, GCS 15, no signs of head trauma, PERRL, + abrasions to nasal bridge, non tender, no crepitus, no septal hematoma, no midline vertebral tenderness, + abrasion to left forearm and rt knee, + tender left hip, good ROM with some pain, abd soft nt nd,

## 2021-10-14 LAB
ANION GAP SERPL CALC-SCNC: 13 MMOL/L — SIGNIFICANT CHANGE UP (ref 7–14)
BUN SERPL-MCNC: 25 MG/DL — HIGH (ref 10–20)
CALCIUM SERPL-MCNC: 9.2 MG/DL — SIGNIFICANT CHANGE UP (ref 8.5–10.1)
CHLORIDE SERPL-SCNC: 100 MMOL/L — SIGNIFICANT CHANGE UP (ref 98–110)
CO2 SERPL-SCNC: 22 MMOL/L — SIGNIFICANT CHANGE UP (ref 17–32)
CREAT SERPL-MCNC: 1.1 MG/DL — SIGNIFICANT CHANGE UP (ref 0.7–1.5)
GLUCOSE SERPL-MCNC: 111 MG/DL — HIGH (ref 70–99)
HCT VFR BLD CALC: 39 % — LOW (ref 42–52)
HGB BLD-MCNC: 12.8 G/DL — LOW (ref 14–18)
MCHC RBC-ENTMCNC: 29.4 PG — SIGNIFICANT CHANGE UP (ref 27–31)
MCHC RBC-ENTMCNC: 32.8 G/DL — SIGNIFICANT CHANGE UP (ref 32–37)
MCV RBC AUTO: 89.7 FL — SIGNIFICANT CHANGE UP (ref 80–94)
NRBC # BLD: 0 /100 WBCS — SIGNIFICANT CHANGE UP (ref 0–0)
PLATELET # BLD AUTO: 255 K/UL — SIGNIFICANT CHANGE UP (ref 130–400)
POTASSIUM SERPL-MCNC: 4.6 MMOL/L — SIGNIFICANT CHANGE UP (ref 3.5–5)
POTASSIUM SERPL-SCNC: 4.6 MMOL/L — SIGNIFICANT CHANGE UP (ref 3.5–5)
RBC # BLD: 4.35 M/UL — LOW (ref 4.7–6.1)
RBC # FLD: 13.4 % — SIGNIFICANT CHANGE UP (ref 11.5–14.5)
SODIUM SERPL-SCNC: 135 MMOL/L — SIGNIFICANT CHANGE UP (ref 135–146)
WBC # BLD: 10.53 K/UL — SIGNIFICANT CHANGE UP (ref 4.8–10.8)
WBC # FLD AUTO: 10.53 K/UL — SIGNIFICANT CHANGE UP (ref 4.8–10.8)

## 2021-10-14 PROCEDURE — 73721 MRI JNT OF LWR EXTRE W/O DYE: CPT | Mod: 26,LT

## 2021-10-14 PROCEDURE — 73610 X-RAY EXAM OF ANKLE: CPT | Mod: 26,RT

## 2021-10-14 PROCEDURE — 99233 SBSQ HOSP IP/OBS HIGH 50: CPT

## 2021-10-14 RX ORDER — MORPHINE SULFATE 50 MG/1
4 CAPSULE, EXTENDED RELEASE ORAL ONCE
Refills: 0 | Status: DISCONTINUED | OUTPATIENT
Start: 2021-10-14 | End: 2021-10-14

## 2021-10-14 RX ORDER — ATORVASTATIN CALCIUM 80 MG/1
40 TABLET, FILM COATED ORAL AT BEDTIME
Refills: 0 | Status: DISCONTINUED | OUTPATIENT
Start: 2021-10-14 | End: 2021-10-26

## 2021-10-14 RX ORDER — INFLUENZA VIRUS VACCINE 15; 15; 15; 15 UG/.5ML; UG/.5ML; UG/.5ML; UG/.5ML
0.5 SUSPENSION INTRAMUSCULAR ONCE
Refills: 0 | Status: DISCONTINUED | OUTPATIENT
Start: 2021-10-14 | End: 2021-10-26

## 2021-10-14 RX ORDER — HEPARIN SODIUM 5000 [USP'U]/ML
5000 INJECTION INTRAVENOUS; SUBCUTANEOUS EVERY 8 HOURS
Refills: 0 | Status: DISCONTINUED | OUTPATIENT
Start: 2021-10-14 | End: 2021-10-26

## 2021-10-14 RX ORDER — AMLODIPINE BESYLATE 2.5 MG/1
10 TABLET ORAL DAILY
Refills: 0 | Status: DISCONTINUED | OUTPATIENT
Start: 2021-10-14 | End: 2021-10-26

## 2021-10-14 RX ORDER — MORPHINE SULFATE 50 MG/1
2 CAPSULE, EXTENDED RELEASE ORAL EVERY 6 HOURS
Refills: 0 | Status: DISCONTINUED | OUTPATIENT
Start: 2021-10-14 | End: 2021-10-15

## 2021-10-14 RX ORDER — ACETAMINOPHEN 500 MG
650 TABLET ORAL EVERY 6 HOURS
Refills: 0 | Status: DISCONTINUED | OUTPATIENT
Start: 2021-10-14 | End: 2021-10-24

## 2021-10-14 RX ADMIN — ATORVASTATIN CALCIUM 40 MILLIGRAM(S): 80 TABLET, FILM COATED ORAL at 21:41

## 2021-10-14 RX ADMIN — OXYCODONE AND ACETAMINOPHEN 1 TABLET(S): 5; 325 TABLET ORAL at 06:12

## 2021-10-14 RX ADMIN — AMLODIPINE BESYLATE 10 MILLIGRAM(S): 2.5 TABLET ORAL at 11:36

## 2021-10-14 RX ADMIN — HEPARIN SODIUM 5000 UNIT(S): 5000 INJECTION INTRAVENOUS; SUBCUTANEOUS at 13:23

## 2021-10-14 RX ADMIN — OXYCODONE AND ACETAMINOPHEN 1 TABLET(S): 5; 325 TABLET ORAL at 10:22

## 2021-10-14 RX ADMIN — MORPHINE SULFATE 4 MILLIGRAM(S): 50 CAPSULE, EXTENDED RELEASE ORAL at 13:38

## 2021-10-14 RX ADMIN — Medication 1 MILLIGRAM(S): at 15:19

## 2021-10-14 RX ADMIN — OXYCODONE AND ACETAMINOPHEN 1 TABLET(S): 5; 325 TABLET ORAL at 12:45

## 2021-10-14 RX ADMIN — MORPHINE SULFATE 2 MILLIGRAM(S): 50 CAPSULE, EXTENDED RELEASE ORAL at 11:17

## 2021-10-14 RX ADMIN — MORPHINE SULFATE 4 MILLIGRAM(S): 50 CAPSULE, EXTENDED RELEASE ORAL at 13:39

## 2021-10-14 RX ADMIN — HEPARIN SODIUM 5000 UNIT(S): 5000 INJECTION INTRAVENOUS; SUBCUTANEOUS at 21:41

## 2021-10-14 RX ADMIN — MORPHINE SULFATE 2 MILLIGRAM(S): 50 CAPSULE, EXTENDED RELEASE ORAL at 11:36

## 2021-10-14 NOTE — PROGRESS NOTE ADULT - ASSESSMENT
70 y/o M PMHx HTN and HLD BIBA present to the Ed today s/p fall. Found to have nondisplaced left hip fracture    #Mechanical fall complicated by Left hip fracture/ R ankle pain   CT Left hip Nondisplaced fracture of the left greater trochanter.  Rest of trauma workup has been negative  - Per ortho, will obtain MR Left hip noncontrast  - Pain control  Check xr R ankle         #HTN/HLD  - Cont amlodipine 10mg qD  - Cont atorvastatin 80mg qD    #CKD Stage 2    - Cont to monitor    DVT PPX, heparin SC

## 2021-10-14 NOTE — PROGRESS NOTE ADULT - SUBJECTIVE AND OBJECTIVE BOX
Patient is a 71y old  Male who presents with a chief complaint of     INTERVAL HPI/OVERNIGHT EVENTS:   c/o pain in R ankle   C/o pain in L hip               PHYSICAL EXAM:   NAD; Normocephalic;   LUNGS - no wheezing  HEART: S1 S2+   ABDOMEN: Soft, Nontender, non distended  EXTREMITIES:  2+ Peripheral Pulses, No clubbing, cyanosis, or edema, ROM of left hip limited due to pain, tender to palpation, R ankle is swollen and tender    NERVOUS SYSTEM:  Awake and alert; no focal neuro deficits appreciated    LABS:                        12.8   10.53 )-----------( 255      ( 14 Oct 2021 08:00 )             39.0     10-14    135  |  100  |  25<H>  ----------------------------<  111<H>  4.6   |  22  |  1.1    Ca    9.2      14 Oct 2021 08:00    TPro  7.5  /  Alb  4.4  /  TBili  0.3  /  DBili  x   /  AST  45<H>  /  ALT  24  /  AlkPhos  98  10-13        CAPILLARY BLOOD GLUCOSE          Medications:  MEDICATIONS  (STANDING):  amLODIPine   Tablet 10 milliGRAM(s) Oral daily  atorvastatin 40 milliGRAM(s) Oral at bedtime  influenza   Vaccine 0.5 milliLiter(s) IntraMuscular once    MEDICATIONS  (PRN):  acetaminophen   Tablet .. 650 milliGRAM(s) Oral every 6 hours PRN Mild Pain (1 - 3)  morphine  - Injectable 2 milliGRAM(s) IV Push every 6 hours PRN Severe Pain (7 - 10)  oxycodone    5 mG/acetaminophen 325 mG 1 Tablet(s) Oral every 4 hours PRN Moderate Pain (4 - 6)

## 2021-10-15 LAB
COVID-19 SPIKE DOMAIN AB INTERP: POSITIVE
COVID-19 SPIKE DOMAIN ANTIBODY RESULT: >250 U/ML — HIGH
SARS-COV-2 IGG+IGM SERPL QL IA: >250 U/ML — HIGH
SARS-COV-2 IGG+IGM SERPL QL IA: POSITIVE

## 2021-10-15 PROCEDURE — 99233 SBSQ HOSP IP/OBS HIGH 50: CPT

## 2021-10-15 PROCEDURE — 99221 1ST HOSP IP/OBS SF/LOW 40: CPT

## 2021-10-15 RX ORDER — PANTOPRAZOLE SODIUM 20 MG/1
40 TABLET, DELAYED RELEASE ORAL EVERY 12 HOURS
Refills: 0 | Status: DISCONTINUED | OUTPATIENT
Start: 2021-10-15 | End: 2021-10-25

## 2021-10-15 RX ORDER — SENNA PLUS 8.6 MG/1
1 TABLET ORAL
Refills: 0 | Status: DISCONTINUED | OUTPATIENT
Start: 2021-10-15 | End: 2021-10-26

## 2021-10-15 RX ORDER — MORPHINE SULFATE 50 MG/1
2 CAPSULE, EXTENDED RELEASE ORAL
Refills: 0 | Status: DISCONTINUED | OUTPATIENT
Start: 2021-10-15 | End: 2021-10-16

## 2021-10-15 RX ADMIN — MORPHINE SULFATE 2 MILLIGRAM(S): 50 CAPSULE, EXTENDED RELEASE ORAL at 22:19

## 2021-10-15 RX ADMIN — MORPHINE SULFATE 2 MILLIGRAM(S): 50 CAPSULE, EXTENDED RELEASE ORAL at 07:09

## 2021-10-15 RX ADMIN — Medication 650 MILLIGRAM(S): at 23:49

## 2021-10-15 RX ADMIN — OXYCODONE AND ACETAMINOPHEN 1 TABLET(S): 5; 325 TABLET ORAL at 09:42

## 2021-10-15 RX ADMIN — MORPHINE SULFATE 2 MILLIGRAM(S): 50 CAPSULE, EXTENDED RELEASE ORAL at 05:34

## 2021-10-15 RX ADMIN — HEPARIN SODIUM 5000 UNIT(S): 5000 INJECTION INTRAVENOUS; SUBCUTANEOUS at 05:38

## 2021-10-15 RX ADMIN — AMLODIPINE BESYLATE 10 MILLIGRAM(S): 2.5 TABLET ORAL at 05:38

## 2021-10-15 RX ADMIN — Medication 650 MILLIGRAM(S): at 22:19

## 2021-10-15 RX ADMIN — ATORVASTATIN CALCIUM 40 MILLIGRAM(S): 80 TABLET, FILM COATED ORAL at 22:18

## 2021-10-15 RX ADMIN — MORPHINE SULFATE 2 MILLIGRAM(S): 50 CAPSULE, EXTENDED RELEASE ORAL at 16:40

## 2021-10-15 RX ADMIN — OXYCODONE AND ACETAMINOPHEN 1 TABLET(S): 5; 325 TABLET ORAL at 09:41

## 2021-10-15 RX ADMIN — SENNA PLUS 1 TABLET(S): 8.6 TABLET ORAL at 18:25

## 2021-10-15 RX ADMIN — MORPHINE SULFATE 2 MILLIGRAM(S): 50 CAPSULE, EXTENDED RELEASE ORAL at 17:10

## 2021-10-15 RX ADMIN — PANTOPRAZOLE SODIUM 40 MILLIGRAM(S): 20 TABLET, DELAYED RELEASE ORAL at 12:57

## 2021-10-15 RX ADMIN — MORPHINE SULFATE 2 MILLIGRAM(S): 50 CAPSULE, EXTENDED RELEASE ORAL at 00:11

## 2021-10-15 RX ADMIN — HEPARIN SODIUM 5000 UNIT(S): 5000 INJECTION INTRAVENOUS; SUBCUTANEOUS at 22:19

## 2021-10-15 RX ADMIN — HEPARIN SODIUM 5000 UNIT(S): 5000 INJECTION INTRAVENOUS; SUBCUTANEOUS at 13:06

## 2021-10-15 RX ADMIN — MORPHINE SULFATE 2 MILLIGRAM(S): 50 CAPSULE, EXTENDED RELEASE ORAL at 23:49

## 2021-10-15 NOTE — CONSULT NOTE ADULT - SUBJECTIVE AND OBJECTIVE BOX
72 y/o M PMHx HTN and HLD admitted s/p fall down 6 concrete steps in front of his house.  Found to have left hip greater trochanter  fracture. C/o left hip pain, difficulties walking due to pain, right ankle pain, right shoulder pain.    PAST MEDICAL & SURGICAL HISTORY:  High blood cholesterol    Hypertension    GERD (gastroesophageal reflux disease)    No significant past surgical history        MEDICATIONS  (STANDING):  amLODIPine   Tablet 10 milliGRAM(s) Oral daily  atorvastatin 40 milliGRAM(s) Oral at bedtime  heparin   Injectable 5000 Unit(s) SubCutaneous every 8 hours  influenza   Vaccine 0.5 milliLiter(s) IntraMuscular once  pantoprazole  Injectable 40 milliGRAM(s) IV Push every 12 hours  senna 1 Tablet(s) Oral two times a day    MEDICATIONS  (PRN):  acetaminophen   Tablet .. 650 milliGRAM(s) Oral every 6 hours PRN Mild Pain (1 - 3)  morphine  - Injectable 2 milliGRAM(s) IV Push every 3 hours PRN Moderate Pain (4 - 6)  oxycodone    5 mG/acetaminophen 325 mG 1 Tablet(s) Oral every 4 hours PRN Moderate Pain (4 - 6)      Allergies    No Known Allergies    Intolerances    Pt s/e at bedside  NAD  Vital Signs Last 24 Hrs  T(C): 37.7 (15 Oct 2021 05:04), Max: 37.9 (14 Oct 2021 19:45)  T(F): 99.8 (15 Oct 2021 05:04), Max: 100.3 (14 Oct 2021 19:45)  HR: 95 (15 Oct 2021 05:04) (95 - 107)  BP: 167/79 (15 Oct 2021 05:04) (150/76 - 167/79)  BP(mean): --  RR: 16 (15 Oct 2021 05:04) (16 - 17)  SpO2: --      PE:  left hip - lateral ttp, no groin ttp, rom limited due to pain, NVID, foot wwp, palpable pulses  right ankle- mild swelling, ttp under lateral malleolus, no medial mal ttp, moves toes, sensation grossly intact, foot wwp  right shoulder - no swelling, ecchymosis, no ttp, shoulder ROM limited due to pain, NVID    RADIOLOGY:    left hip CT and MRI showed GT fx, no extension into intertrochanteric region  no right shoulder fx or dislocation  no right ankle fx    EXAM:  MR HIP LT            PROCEDURE DATE:  10/14/2021            INTERPRETATION:  Clinical History / Reason for exam: Hip fracture    TECHNIQUE: Multiplanar multisequence MRI of the left hip without intravenous contrast was performed.    COMPARISON: CT of the left hip October 13, 2021    FINDINGS:    This is an incomplete exam as the patient could not tolerate the exam secondary to pain. Image quality is degraded by motion artifact. Again seen is acute minimally displaced greater trochantericfracture with associated bone marrow edema. There is no fracture extension into the intertrochanteric ridge or the femoral neck. There is small hip joint effusion. Degenerative changes are noted of the left hip.    The gluteus minimus and medius appear intact on the fractured greater trochanteric fragment. Extensive soft tissue edema is noted adjacent to the left hip in the gluteus muscles, adductor muscle and vastus lateralis.    The visualized intrapelvic structures are unremarkable.    The left iliopsoas insertion is intact. There is left hamstring origin degeneration/partial thickness tear, partially imaged.    IMPRESSION:  Incomplete exam and exam quality degraded by motion artifact.    Acute minimally displaced greater trochanteric fracture with associated bone marrow edema. No fracture extension into intertrochanteric ridge with femoral neck.      --- End of Report ---

## 2021-10-15 NOTE — CONSULT NOTE ADULT - ASSESSMENT
71 m with left hip minimally displaced greater trochanter fx, no extension into intertrochanteric region, right ankle sprain s/p fall    - no ortho interventions  - pain control  - PT, WBAT b/l LE  - right ankle elevation, icing  - dvt ppx

## 2021-10-15 NOTE — PROGRESS NOTE ADULT - ASSESSMENT
70 y/o M PMHx HTN and HLD BIBA present to the Ed today s/p fall. Found to have nondisplaced left hip fracture    #Mechanical fall with left hip minimally displaced greater trochanter fx, no extension into intertrochanteric region,   CT Left hip Nondisplaced fracture of the left greater trochanter.  Rest of trauma workup has been negative  - Pain control  Ortho c appreciated  No sx indicated     #Right ankle sprain s/p fall      R radial N paresis 2' fall  XR neg for shoulder dislocation     #HTN/HLD  - Cont amlodipine 10mg qD  - Cont atorvastatin 80mg qD    #CKD Stage 2    - Cont to monitor    DVT PPX, heparin SC    To SNF soon

## 2021-10-15 NOTE — PROGRESS NOTE ADULT - SUBJECTIVE AND OBJECTIVE BOX
Patient is a 71y old  Male who presents with a chief complaint of fall     INTERVAL HPI/OVERNIGHT EVENTS:    R ankle pain is improving   pain in L hip again this am   C/O R SHOULDER PAIN THIS AM Cannot extend this arm this am               PHYSICAL EXAM:   NAD; Normocephalic;   LUNGS - no wheezing  HEART: S1 S2+   ABDOMEN: Soft, Nontender, non distended  EXTREMITIES:  2+ Peripheral Pulses, No clubbing, cyanosis, or edema, ROM of left hip limited due to pain, tender to palpation, R ankle swelling is improving  R shoulder tenderness cannot extend more than 90     NERVOUS SYSTEM:  Awake and alert; no focal neuro deficits appreciated    LABS:                        12.8   10.53 )-----------( 255      ( 14 Oct 2021 08:00 )             39.0     10-14    135  |  100  |  25<H>  ----------------------------<  111<H>  4.6   |  22  |  1.1    Ca    9.2      14 Oct 2021 08:00    TPro  7.5  /  Alb  4.4  /  TBili  0.3  /  DBili  x   /  AST  45<H>  /  ALT  24  /  AlkPhos  98  10-13        CAPILLARY BLOOD GLUCOSE          Medications:  MEDICATIONS  (STANDING):  amLODIPine   Tablet 10 milliGRAM(s) Oral daily  atorvastatin 40 milliGRAM(s) Oral at bedtime  influenza   Vaccine 0.5 milliLiter(s) IntraMuscular once    MEDICATIONS  (PRN):  acetaminophen   Tablet .. 650 milliGRAM(s) Oral every 6 hours PRN Mild Pain (1 - 3)  morphine  - Injectable 2 milliGRAM(s) IV Push every 6 hours PRN Severe Pain (7 - 10)  oxycodone    5 mG/acetaminophen 325 mG 1 Tablet(s) Oral every 4 hours PRN Moderate Pain (4 - 6)

## 2021-10-16 LAB
HCT VFR BLD CALC: 39.2 % — LOW (ref 42–52)
HGB BLD-MCNC: 13.4 G/DL — LOW (ref 14–18)
MCHC RBC-ENTMCNC: 29.8 PG — SIGNIFICANT CHANGE UP (ref 27–31)
MCHC RBC-ENTMCNC: 34.2 G/DL — SIGNIFICANT CHANGE UP (ref 32–37)
MCV RBC AUTO: 87.3 FL — SIGNIFICANT CHANGE UP (ref 80–94)
NRBC # BLD: 0 /100 WBCS — SIGNIFICANT CHANGE UP (ref 0–0)
PLATELET # BLD AUTO: 292 K/UL — SIGNIFICANT CHANGE UP (ref 130–400)
RBC # BLD: 4.49 M/UL — LOW (ref 4.7–6.1)
RBC # FLD: 12.9 % — SIGNIFICANT CHANGE UP (ref 11.5–14.5)
WBC # BLD: 15.29 K/UL — HIGH (ref 4.8–10.8)
WBC # FLD AUTO: 15.29 K/UL — HIGH (ref 4.8–10.8)

## 2021-10-16 PROCEDURE — 74176 CT ABD & PELVIS W/O CONTRAST: CPT | Mod: 26

## 2021-10-16 PROCEDURE — 71045 X-RAY EXAM CHEST 1 VIEW: CPT | Mod: 26

## 2021-10-16 PROCEDURE — 74018 RADEX ABDOMEN 1 VIEW: CPT | Mod: 26

## 2021-10-16 PROCEDURE — 99233 SBSQ HOSP IP/OBS HIGH 50: CPT

## 2021-10-16 RX ORDER — ONDANSETRON 8 MG/1
4 TABLET, FILM COATED ORAL EVERY 8 HOURS
Refills: 0 | Status: DISCONTINUED | OUTPATIENT
Start: 2021-10-16 | End: 2021-10-26

## 2021-10-16 RX ORDER — DIATRIZOATE MEGLUMINE 180 MG/ML
30 INJECTION, SOLUTION INTRAVESICAL ONCE
Refills: 0 | Status: DISCONTINUED | OUTPATIENT
Start: 2021-10-16 | End: 2021-10-16

## 2021-10-16 RX ORDER — IOHEXOL 300 MG/ML
30 INJECTION, SOLUTION INTRAVENOUS ONCE
Refills: 0 | Status: COMPLETED | OUTPATIENT
Start: 2021-10-16 | End: 2021-10-16

## 2021-10-16 RX ORDER — SIMETHICONE 80 MG/1
80 TABLET, CHEWABLE ORAL EVERY 8 HOURS
Refills: 0 | Status: DISCONTINUED | OUTPATIENT
Start: 2021-10-16 | End: 2021-10-26

## 2021-10-16 RX ORDER — MORPHINE SULFATE 50 MG/1
2 CAPSULE, EXTENDED RELEASE ORAL EVERY 4 HOURS
Refills: 0 | Status: DISCONTINUED | OUTPATIENT
Start: 2021-10-16 | End: 2021-10-17

## 2021-10-16 RX ORDER — SODIUM CHLORIDE 9 MG/ML
1000 INJECTION, SOLUTION INTRAVENOUS
Refills: 0 | Status: DISCONTINUED | OUTPATIENT
Start: 2021-10-16 | End: 2021-10-20

## 2021-10-16 RX ADMIN — HEPARIN SODIUM 5000 UNIT(S): 5000 INJECTION INTRAVENOUS; SUBCUTANEOUS at 05:55

## 2021-10-16 RX ADMIN — PANTOPRAZOLE SODIUM 40 MILLIGRAM(S): 20 TABLET, DELAYED RELEASE ORAL at 17:41

## 2021-10-16 RX ADMIN — HEPARIN SODIUM 5000 UNIT(S): 5000 INJECTION INTRAVENOUS; SUBCUTANEOUS at 14:17

## 2021-10-16 RX ADMIN — OXYCODONE AND ACETAMINOPHEN 1 TABLET(S): 5; 325 TABLET ORAL at 07:58

## 2021-10-16 RX ADMIN — OXYCODONE AND ACETAMINOPHEN 1 TABLET(S): 5; 325 TABLET ORAL at 09:00

## 2021-10-16 RX ADMIN — OXYCODONE AND ACETAMINOPHEN 1 TABLET(S): 5; 325 TABLET ORAL at 23:33

## 2021-10-16 RX ADMIN — SENNA PLUS 1 TABLET(S): 8.6 TABLET ORAL at 17:41

## 2021-10-16 RX ADMIN — HEPARIN SODIUM 5000 UNIT(S): 5000 INJECTION INTRAVENOUS; SUBCUTANEOUS at 21:09

## 2021-10-16 RX ADMIN — PANTOPRAZOLE SODIUM 40 MILLIGRAM(S): 20 TABLET, DELAYED RELEASE ORAL at 05:54

## 2021-10-16 RX ADMIN — SODIUM CHLORIDE 125 MILLILITER(S): 9 INJECTION, SOLUTION INTRAVENOUS at 18:54

## 2021-10-16 RX ADMIN — AMLODIPINE BESYLATE 10 MILLIGRAM(S): 2.5 TABLET ORAL at 05:54

## 2021-10-16 RX ADMIN — IOHEXOL 30 MILLILITER(S): 300 INJECTION, SOLUTION INTRAVENOUS at 21:00

## 2021-10-16 RX ADMIN — OXYCODONE AND ACETAMINOPHEN 1 TABLET(S): 5; 325 TABLET ORAL at 13:15

## 2021-10-16 RX ADMIN — ATORVASTATIN CALCIUM 40 MILLIGRAM(S): 80 TABLET, FILM COATED ORAL at 21:09

## 2021-10-16 RX ADMIN — OXYCODONE AND ACETAMINOPHEN 1 TABLET(S): 5; 325 TABLET ORAL at 12:27

## 2021-10-16 NOTE — PROGRESS NOTE ADULT - SUBJECTIVE AND OBJECTIVE BOX
Patient is a 71y old  Male who presents with a chief complaint of fall     INTERVAL HPI/OVERNIGHT EVENTS:    R ankle pain is improving   Had a fever  Hip pain improving  RUE, extending it up little more than yesterday                   PHYSICAL EXAM:   NAD; Normocephalic;   LUNGS - no wheezing  HEART: S1 S2+   ABDOMEN: Soft, Nontender, non distended  EXTREMITIES:  2+ Peripheral Pulses, No clubbing, cyanosis, or edema, ROM of left hip limited due to pain, tender to palpation, R ankle swelling is improving  R shoulder tenderness cannot extend more than 90     NERVOUS SYSTEM:  Awake and alert; no focal neuro deficits appreciated    LABS:                        12.8   10.53 )-----------( 255      ( 14 Oct 2021 08:00 )             39.0     10-14    135  |  100  |  25<H>  ----------------------------<  111<H>  4.6   |  22  |  1.1    Ca    9.2      14 Oct 2021 08:00    TPro  7.5  /  Alb  4.4  /  TBili  0.3  /  DBili  x   /  AST  45<H>  /  ALT  24  /  AlkPhos  98  10-13        CAPILLARY BLOOD GLUCOSE          Medications:  MEDICATIONS  (STANDING):  amLODIPine   Tablet 10 milliGRAM(s) Oral daily  atorvastatin 40 milliGRAM(s) Oral at bedtime  influenza   Vaccine 0.5 milliLiter(s) IntraMuscular once    MEDICATIONS  (PRN):  acetaminophen   Tablet .. 650 milliGRAM(s) Oral every 6 hours PRN Mild Pain (1 - 3)  morphine  - Injectable 2 milliGRAM(s) IV Push every 6 hours PRN Severe Pain (7 - 10)  oxycodone    5 mG/acetaminophen 325 mG 1 Tablet(s) Oral every 4 hours PRN Moderate Pain (4 - 6)              Patient is a 71y old  Male who presents with a chief complaint of fall     INTERVAL HPI/OVERNIGHT EVENTS:    R ankle pain is improving   Had a fever  Hip pain improving  RUE, extending it up little more than yesterday   c/o gas and bloating                PHYSICAL EXAM:   NAD; Normocephalic;   LUNGS - no wheezing  HEART: S1 S2+   ABDOMEN: Soft, Nontender, non distended  EXTREMITIES:  2+ Peripheral Pulses, No clubbing, cyanosis, or edema, ROM of left hip limited due to pain, tender to palpation, R ankle swelling is improving  R shoulder tenderness cannot extend more than 90     NERVOUS SYSTEM:  Awake and alert; no focal neuro deficits appreciated    LABS:                        12.8   10.53 )-----------( 255      ( 14 Oct 2021 08:00 )             39.0     10-14    135  |  100  |  25<H>  ----------------------------<  111<H>  4.6   |  22  |  1.1    Ca    9.2      14 Oct 2021 08:00    TPro  7.5  /  Alb  4.4  /  TBili  0.3  /  DBili  x   /  AST  45<H>  /  ALT  24  /  AlkPhos  98  10-13        CAPILLARY BLOOD GLUCOSE          Medications:  MEDICATIONS  (STANDING):  amLODIPine   Tablet 10 milliGRAM(s) Oral daily  atorvastatin 40 milliGRAM(s) Oral at bedtime  influenza   Vaccine 0.5 milliLiter(s) IntraMuscular once    MEDICATIONS  (PRN):  acetaminophen   Tablet .. 650 milliGRAM(s) Oral every 6 hours PRN Mild Pain (1 - 3)  morphine  - Injectable 2 milliGRAM(s) IV Push every 6 hours PRN Severe Pain (7 - 10)  oxycodone    5 mG/acetaminophen 325 mG 1 Tablet(s) Oral every 4 hours PRN Moderate Pain (4 - 6)

## 2021-10-16 NOTE — PROGRESS NOTE ADULT - ASSESSMENT
72 y/o M PMHx HTN and HLD BIBA present to the Ed today s/p fall. Found to have nondisplaced left hip fracture    #Mechanical fall with left hip minimally displaced greater trochanter fx, no extension into intertrochanteric region,   CT Left hip Nondisplaced fracture of the left greater trochanter.  Rest of trauma workup has been negative  - Pain control  Ortho c appreciated  No sx indicated     #Right ankle sprain s/p fall      R radial N paresis 2' fall  XR neg for shoulder dislocation     #HTN/HLD  - Cont amlodipine 10mg qD  - Cont atorvastatin 80mg qD    #CKD Stage 2    - Cont to monitor    fever  Check CXR  IS     DVT PPX, heparin SC    To SNF soon

## 2021-10-17 LAB
ALBUMIN SERPL ELPH-MCNC: 4 G/DL — SIGNIFICANT CHANGE UP (ref 3.5–5.2)
ALP SERPL-CCNC: 83 U/L — SIGNIFICANT CHANGE UP (ref 30–115)
ALT FLD-CCNC: 13 U/L — SIGNIFICANT CHANGE UP (ref 0–41)
ANION GAP SERPL CALC-SCNC: 18 MMOL/L — HIGH (ref 7–14)
AST SERPL-CCNC: 19 U/L — SIGNIFICANT CHANGE UP (ref 0–41)
BILIRUB SERPL-MCNC: 0.7 MG/DL — SIGNIFICANT CHANGE UP (ref 0.2–1.2)
BUN SERPL-MCNC: 41 MG/DL — HIGH (ref 10–20)
CALCIUM SERPL-MCNC: 9.3 MG/DL — SIGNIFICANT CHANGE UP (ref 8.5–10.1)
CHLORIDE SERPL-SCNC: 98 MMOL/L — SIGNIFICANT CHANGE UP (ref 98–110)
CO2 SERPL-SCNC: 17 MMOL/L — SIGNIFICANT CHANGE UP (ref 17–32)
CREAT SERPL-MCNC: 1.6 MG/DL — HIGH (ref 0.7–1.5)
GLUCOSE SERPL-MCNC: 152 MG/DL — HIGH (ref 70–99)
HCT VFR BLD CALC: 37 % — LOW (ref 42–52)
HGB BLD-MCNC: 12.6 G/DL — LOW (ref 14–18)
MCHC RBC-ENTMCNC: 29.6 PG — SIGNIFICANT CHANGE UP (ref 27–31)
MCHC RBC-ENTMCNC: 34.1 G/DL — SIGNIFICANT CHANGE UP (ref 32–37)
MCV RBC AUTO: 86.9 FL — SIGNIFICANT CHANGE UP (ref 80–94)
NRBC # BLD: 0 /100 WBCS — SIGNIFICANT CHANGE UP (ref 0–0)
PLATELET # BLD AUTO: 317 K/UL — SIGNIFICANT CHANGE UP (ref 130–400)
POTASSIUM SERPL-MCNC: 3.7 MMOL/L — SIGNIFICANT CHANGE UP (ref 3.5–5)
POTASSIUM SERPL-SCNC: 3.7 MMOL/L — SIGNIFICANT CHANGE UP (ref 3.5–5)
PROT SERPL-MCNC: 7 G/DL — SIGNIFICANT CHANGE UP (ref 6–8)
RBC # BLD: 4.26 M/UL — LOW (ref 4.7–6.1)
RBC # FLD: 12.9 % — SIGNIFICANT CHANGE UP (ref 11.5–14.5)
SODIUM SERPL-SCNC: 133 MMOL/L — LOW (ref 135–146)
WBC # BLD: 10.94 K/UL — HIGH (ref 4.8–10.8)
WBC # FLD AUTO: 10.94 K/UL — HIGH (ref 4.8–10.8)

## 2021-10-17 PROCEDURE — 99233 SBSQ HOSP IP/OBS HIGH 50: CPT

## 2021-10-17 RX ADMIN — HEPARIN SODIUM 5000 UNIT(S): 5000 INJECTION INTRAVENOUS; SUBCUTANEOUS at 21:57

## 2021-10-17 RX ADMIN — OXYCODONE AND ACETAMINOPHEN 1 TABLET(S): 5; 325 TABLET ORAL at 21:57

## 2021-10-17 RX ADMIN — HEPARIN SODIUM 5000 UNIT(S): 5000 INJECTION INTRAVENOUS; SUBCUTANEOUS at 05:02

## 2021-10-17 RX ADMIN — MORPHINE SULFATE 2 MILLIGRAM(S): 50 CAPSULE, EXTENDED RELEASE ORAL at 00:18

## 2021-10-17 RX ADMIN — ATORVASTATIN CALCIUM 40 MILLIGRAM(S): 80 TABLET, FILM COATED ORAL at 21:57

## 2021-10-17 RX ADMIN — OXYCODONE AND ACETAMINOPHEN 1 TABLET(S): 5; 325 TABLET ORAL at 15:23

## 2021-10-17 RX ADMIN — PANTOPRAZOLE SODIUM 40 MILLIGRAM(S): 20 TABLET, DELAYED RELEASE ORAL at 05:02

## 2021-10-17 RX ADMIN — HEPARIN SODIUM 5000 UNIT(S): 5000 INJECTION INTRAVENOUS; SUBCUTANEOUS at 18:06

## 2021-10-17 RX ADMIN — SENNA PLUS 1 TABLET(S): 8.6 TABLET ORAL at 05:02

## 2021-10-17 RX ADMIN — OXYCODONE AND ACETAMINOPHEN 1 TABLET(S): 5; 325 TABLET ORAL at 18:17

## 2021-10-17 RX ADMIN — OXYCODONE AND ACETAMINOPHEN 1 TABLET(S): 5; 325 TABLET ORAL at 22:04

## 2021-10-17 RX ADMIN — AMLODIPINE BESYLATE 10 MILLIGRAM(S): 2.5 TABLET ORAL at 05:02

## 2021-10-17 RX ADMIN — SODIUM CHLORIDE 125 MILLILITER(S): 9 INJECTION, SOLUTION INTRAVENOUS at 12:55

## 2021-10-17 RX ADMIN — PANTOPRAZOLE SODIUM 40 MILLIGRAM(S): 20 TABLET, DELAYED RELEASE ORAL at 18:06

## 2021-10-17 RX ADMIN — SIMETHICONE 80 MILLIGRAM(S): 80 TABLET, CHEWABLE ORAL at 22:04

## 2021-10-17 RX ADMIN — SENNA PLUS 1 TABLET(S): 8.6 TABLET ORAL at 18:07

## 2021-10-17 NOTE — PHYSICAL THERAPY INITIAL EVALUATION ADULT - IMPAIRED TRANSFERS: SIT/STAND, REHAB EVAL
decreased endurance/impaired balance/decreased flexibility/pain/impaired postural control/decreased strength

## 2021-10-17 NOTE — PROGRESS NOTE ADULT - SUBJECTIVE AND OBJECTIVE BOX
Patient is a 71y old  Male who presents with a chief complaint of fall     INTERVAL HPI/OVERNIGHT EVENTS:    R ankle pain is improving   Had a fever  Hip pain improving  RUE, extending it up little more than yesterday   Nausea/vomiting resolved  Had diarrhea                 PHYSICAL EXAM:   NAD; Normocephalic;   LUNGS - no wheezing  HEART: S1 S2+   ABDOMEN: Soft, Nontender, + distention BS nl   EXTREMITIES:  2+ Peripheral Pulses, No clubbing, cyanosis, or edema, ROM of left hip limited due to pain, tender to palpation, R ankle swelling is improving  R shoulder tenderness cannot extend more than 90     NERVOUS SYSTEM:  Awake and alert; no focal neuro deficits appreciated                            12.6   10.94 )-----------( 317      ( 17 Oct 2021 08:11 )             37.0       10-17    133<L>  |  98  |  41<H>  ----------------------------<  152<H>  3.7   |  17  |  1.6<H>    Ca    9.3      17 Oct 2021 08:11    TPro  7.0  /  Alb  4.0  /  TBili  0.7  /  DBili  x   /  AST  19  /  ALT  13  /  AlkPhos  83  10-17                      Lactate Trend            CAPILLARY BLOOD GLUCOSE            MEDICATIONS  (STANDING):  amLODIPine   Tablet 10 milliGRAM(s) Oral daily  atorvastatin 40 milliGRAM(s) Oral at bedtime  dextrose 5% + sodium chloride 0.45%. 1000 milliLiter(s) (125 mL/Hr) IV Continuous <Continuous>  heparin   Injectable 5000 Unit(s) SubCutaneous every 8 hours  influenza   Vaccine 0.5 milliLiter(s) IntraMuscular once  pantoprazole  Injectable 40 milliGRAM(s) IV Push every 12 hours  senna 1 Tablet(s) Oral two times a day    MEDICATIONS  (PRN):  acetaminophen   Tablet .. 650 milliGRAM(s) Oral every 6 hours PRN Mild Pain (1 - 3)  ondansetron Injectable 4 milliGRAM(s) IV Push every 8 hours PRN Nausea and/or Vomiting  oxycodone    5 mG/acetaminophen 325 mG 1 Tablet(s) Oral every 4 hours PRN Moderate Pain (4 - 6)  simethicone 80 milliGRAM(s) Chew every 8 hours PRN Gas

## 2021-10-17 NOTE — PROGRESS NOTE ADULT - ASSESSMENT
72 y/o M PMHx HTN and HLD BIBA present to the Ed today s/p fall. Found to have nondisplaced left hip fracture          # Nasuea/ vomiting likely 2' ileous due to narcotics Vs mechanical SBO?   No vomiting this am  + BMs  DC morphine     #Mechanical fall with left hip minimally displaced greater trochanter fx, no extension into intertrochanteric region  CT Left hip Nondisplaced fracture of the left greater trochanter.  Rest of trauma workup has been negative  - Pain control  Ortho c appreciated  No sx indicated     #Right ankle sprain s/p fall      R radial N paresis 2' fall  XR neg for shoulder dislocation     #HTN/HLD  - Cont amlodipine 10mg qD  - Cont atorvastatin 80mg qD    #WILL on CKD Stage 2' hypovelemia due to vomiting   IVF      fever, resolved    CXR- neg   IS     DVT PPX, heparin SC

## 2021-10-17 NOTE — PHYSICAL THERAPY INITIAL EVALUATION ADULT - GAIT DEVIATIONS NOTED, PT EVAL
decreased talisha/increased time in double stance/decreased step length/decreased weight-shifting ability

## 2021-10-17 NOTE — PHYSICAL THERAPY INITIAL EVALUATION ADULT - GENERAL OBSERVATIONS, REHAB EVAL
11:25 - 11:50. Chart reviewed. Patient available to be seen for physical therapy, confirmed with nurse. Patient encountered semi-reclined in bed, +IV. C/o (L) hip/thigh pain, agreeable for PT evaluation now.

## 2021-10-17 NOTE — PHYSICAL THERAPY INITIAL EVALUATION ADULT - ADDITIONAL COMMENTS
Per patient ,resides in a private home with his spouse; +6 steps to enter.  Independent with all mobility / ADLs at baseline.

## 2021-10-17 NOTE — CONSULT NOTE ADULT - SUBJECTIVE AND OBJECTIVE BOX
70 y/o M PMHx GERD, HTN and HLD, H/O FB INGESTION S/P EGD -REMOVAL OF  FB  BY  DR CUEVAS  7/20/20  BIBA  -s/p fall down 6 concrete steps in front of his house at 11am. Denies any LOC prior to event, fall was mechanical. Found to have left hip nondisplaced fracture.seen by ORTHO  -NO SURGICAL REPAIR INDICATED,NOW  WITH  C/O  DIFFUSE ABDOMINAL PAIN  WITH ASSOC . NAUSEA - CAT SCAN A&P  WITH  PO CONTRAST  DONE  SHOWING  SBO-TRANSITION POINT IN MID  ABDOMEN,+ LARGE  BM THIS  AM  POST  CT, + FLATUS  Denies chest pain, dyspnea,  diarrhea,dysuria, recent fevers/chills.     Review of Systems:  Other Review of Systems: All other review of systems negative, except as noted in HPI      Allergies and Intolerances:        Allergies:  	No Known Allergies:     Home Medications:   * Patient Currently Takes Medications as of 28-Sep-2020 10:14 documented in Structured Notes  · 	Dulcolax Laxative 10 mg rectal suppository: 1 suppository(ies) rectally once a day, As Needed   · 	Protonix 40 mg oral delayed release tablet: 1 tab(s) orally 2 times a day   · 	atorvastatin 40 mg oral tablet: 1 tab(s) orally once a day (at bedtime)  · 	amLODIPine 10 mg oral tablet: 1 tab(s) orally once a day    Patient History:    Social History:  Social History (marital status, living situation, occupation, tobacco use, alcohol and drug use, and sexual history): Denies smoking, drinking, drug abuse     Tobacco Screening:  · Core Measure Site	No  · Has the patient used tobacco in the past 30 days?	No    Risk Assessment:    Present on Admission:  Deep Venous Thrombosis	no  Pulmonary Embolus	no     Heart Failure:  Does this patient have a history of or has been diagnosed with heart failure? no.    Physical Exam:   Physical Exam: PHYSICAL EXAM:  GENERAL: NAD, OBESE  HEAD:  Atraumatic, Normocephalic  EYES: EOMI, PERRLA, conjunctiva and sclera clear  NECK: Supple, No JVD  CHEST/LUNG: Clear to auscultation bilaterally; No wheeze  HEART: Regular rate and rhythm; No murmurs, rubs, or gallops  ABDOMEN:+ DIFFUSE  MILD  tender,+distended;HYPOACTIVE  Bowel sounds present  EXTREMITIES:  2+ Peripheral Pulses, No clubbing, cyanosis, or edema, ROM of left hip limited due to pain, tender to palpation  PSYCH: AAOx3  SKIN: No rashes or lesions                        13.4   15.29 )-----------( 292      ( 16 Oct 2021 15:14 )             39.2   ******PRELIMINARY REPORT******    ******PRELIMINARY REPORT******          EXAM:  CT ABDOMEN AND PELVIS OC            PROCEDURE DATE:  10/16/2021          ******PRELIMINARY REPORT******    ******PRELIMINARY REPORT******          INTERPRETATION:  CLINICAL STATEMENT: Abdominal pain.    TECHNIQUE: Contiguous axial CT images were obtained from the lower chest to the pubic symphysis without intravenous contrast.  Oral contrast was administered.  Reformatted images in the coronal and sagittal planes were acquired.    COMPARISON CT: Abdomen and pelvis dated 7/21/2020    OTHER STUDIES USED FOR CORRELATION: None.      FINDINGS:    LOWER CHEST: Unremarkable.    HEPATOBILIARY: Previously described hepatic dome hypodense lesion is not clearly delineatedon this noncontrast examination. No intrahepatic biliary ductal dilatation. The gallbladder is present.    SPLEEN: Unremarkable.    PANCREAS: Unremarkable.    ADRENAL GLANDS: Unremarkable.    KIDNEYS: Lobulated appearance of the bilateral kidneys again noted. No hydronephrosis or obstructing calculus.    ABDOMINOPELVIC NODES: No abdominal pelvic lymphadenopathy.    PELVIC ORGANS: Under distention limits complete assessment of the urinary bladder.    PERITONEUM/MESENTERY/BOWEL: Multiple loops of dilated small bowel with air-fluid levels. Apparent transition point in the mid abdomen (series 4 image 252). Redemonstrated nodularity of the appendiceal tip (series 4 image 223), measuring up to 1.7 cm and not significantly changed from prior examination.    BONES/SOFT TISSUES: Degenerative changes of the visualized thoracal lumbar spine.    OTHER: Calcified atherosclerotic disease of the aorta and branch vessels      IMPRESSION:    Findings consistent with small bowel obstruction. Apparent transition point in the mid abdomen.    Redemonstrated nodularity at the appendiceal tip, not significantly changed from prior examination.      Dr. Gurinder Rios discussed preliminary findings with DES JUNIOR MD on 10/17/2021 4:29 AM with readback.          ******PRELIMINARY REPORT******    ******PRELIMINARY REPORT******          GURINDER RIOS MD; Resident Radiologist

## 2021-10-17 NOTE — CONSULT NOTE ADULT - ASSESSMENT
Small bowel obstruction. Apparent transition point in the mid abdomen.  NPO  IVF  PAIN MGMT  NGT  LCWS  IF  N/V  GI  CONSULT  WILL FOLLOW

## 2021-10-17 NOTE — PHYSICAL THERAPY INITIAL EVALUATION ADULT - RANGE OF MOTION EXAMINATION, REHAB EVAL
RLE AAROM/PROM WFL grossly throughout, (L) hip and knee ROM limited approx 50% range due to incr pain

## 2021-10-17 NOTE — PHYSICAL THERAPY INITIAL EVALUATION ADULT - PERTINENT HX OF CURRENT PROBLEM, REHAB EVAL
Admitted for left hip minimally displaced greater trochanter fx, right ankle sprain s/p fall ; while hospitalized, developed SBO

## 2021-10-18 PROCEDURE — 99233 SBSQ HOSP IP/OBS HIGH 50: CPT

## 2021-10-18 PROCEDURE — 99223 1ST HOSP IP/OBS HIGH 75: CPT

## 2021-10-18 PROCEDURE — 74018 RADEX ABDOMEN 1 VIEW: CPT | Mod: 26

## 2021-10-18 RX ADMIN — OXYCODONE AND ACETAMINOPHEN 1 TABLET(S): 5; 325 TABLET ORAL at 20:32

## 2021-10-18 RX ADMIN — PANTOPRAZOLE SODIUM 40 MILLIGRAM(S): 20 TABLET, DELAYED RELEASE ORAL at 05:44

## 2021-10-18 RX ADMIN — SENNA PLUS 1 TABLET(S): 8.6 TABLET ORAL at 05:55

## 2021-10-18 RX ADMIN — SENNA PLUS 1 TABLET(S): 8.6 TABLET ORAL at 17:45

## 2021-10-18 RX ADMIN — OXYCODONE AND ACETAMINOPHEN 1 TABLET(S): 5; 325 TABLET ORAL at 14:48

## 2021-10-18 RX ADMIN — AMLODIPINE BESYLATE 10 MILLIGRAM(S): 2.5 TABLET ORAL at 05:55

## 2021-10-18 RX ADMIN — HEPARIN SODIUM 5000 UNIT(S): 5000 INJECTION INTRAVENOUS; SUBCUTANEOUS at 21:18

## 2021-10-18 RX ADMIN — OXYCODONE AND ACETAMINOPHEN 1 TABLET(S): 5; 325 TABLET ORAL at 06:00

## 2021-10-18 RX ADMIN — OXYCODONE AND ACETAMINOPHEN 1 TABLET(S): 5; 325 TABLET ORAL at 06:33

## 2021-10-18 RX ADMIN — HEPARIN SODIUM 5000 UNIT(S): 5000 INJECTION INTRAVENOUS; SUBCUTANEOUS at 05:02

## 2021-10-18 RX ADMIN — HEPARIN SODIUM 5000 UNIT(S): 5000 INJECTION INTRAVENOUS; SUBCUTANEOUS at 14:49

## 2021-10-18 RX ADMIN — PANTOPRAZOLE SODIUM 40 MILLIGRAM(S): 20 TABLET, DELAYED RELEASE ORAL at 17:45

## 2021-10-18 RX ADMIN — ATORVASTATIN CALCIUM 40 MILLIGRAM(S): 80 TABLET, FILM COATED ORAL at 21:18

## 2021-10-18 NOTE — CONSULT NOTE ADULT - SUBJECTIVE AND OBJECTIVE BOX
HPI:  72 y/o M PMHx HTN and HLD BIBA present to the Ed today s/p fall down 6 concrete steps in front of his house at 11am. Denies any LOC prior to event, fall was mechanical. Was helped up by daughter, but came to ED due to ongoing pain. Found to have left hip nondisplaced fracture. Denies chest pain, dyspnea, nausea/vomiting/abdominal pain, diarrhea,dysuria, recent fevers/chills. ptn seen at  bed  side  dtr  is  at  bed  side  nad  aox3  fu  command  no  new  c/o    PTN  REFERRED TO ACUTE  REHAB  FOR  EVAL AND  TX   PAST MEDICAL & SURGICAL HISTORY:  High blood cholesterol    Hypertension    GERD (gastroesophageal reflux disease)    No significant past surgical history        Hospital Course:    TODAY'S SUBJECTIVE & REVIEW OF SYMPTOMS:     Constitutional WNL   Cardio WNL   Resp WNL   GI WNL  Heme WNL  Endo WNL  Skin WNL  MSK WNL  Neuro WNL  Cognitive WNL  Psych WNL      MEDICATIONS  (STANDING):  amLODIPine   Tablet 10 milliGRAM(s) Oral daily  atorvastatin 40 milliGRAM(s) Oral at bedtime  dextrose 5% + sodium chloride 0.45%. 1000 milliLiter(s) (125 mL/Hr) IV Continuous <Continuous>  heparin   Injectable 5000 Unit(s) SubCutaneous every 8 hours  influenza   Vaccine 0.5 milliLiter(s) IntraMuscular once  pantoprazole  Injectable 40 milliGRAM(s) IV Push every 12 hours  senna 1 Tablet(s) Oral two times a day    MEDICATIONS  (PRN):  acetaminophen   Tablet .. 650 milliGRAM(s) Oral every 6 hours PRN Mild Pain (1 - 3)  ondansetron Injectable 4 milliGRAM(s) IV Push every 8 hours PRN Nausea and/or Vomiting  oxycodone    5 mG/acetaminophen 325 mG 1 Tablet(s) Oral every 4 hours PRN Moderate Pain (4 - 6)  simethicone 80 milliGRAM(s) Chew every 8 hours PRN Gas      FAMILY HISTORY:      Allergies    No Known Allergies    Intolerances        SOCIAL HISTORY:    [  ] Etoh  [  ] Smoking  [  ] Substance abuse     Home Environment:  [  ] Home Alone  [ x ] Lives with Family  [  ] Home Health Aid    Dwelling:  [  ] Apartment  [ x ] Private House  [  ] Adult Home  [  ] Skilled Nursing Facility      [  ] Short Term  [  ] Long Term  [ x ] Stairs       Elevator [  ]    FUNCTIONAL STATUS PTA: (Check all that apply)  Ambulation: [ x  ]Independent    [  ] Dependent     [  ] Non-Ambulatory  Assistive Device: [  ] SA Cane  [  ]  Q Cane  [  ] Walker  [  ]  Wheelchair  ADL : [x  ] Independent  [  ]  Dependent       Vital Signs Last 24 Hrs  T(C): 36.9 (18 Oct 2021 05:43), Max: 37.4 (17 Oct 2021 21:05)  T(F): 98.5 (18 Oct 2021 05:43), Max: 99.3 (17 Oct 2021 21:05)  HR: 83 (18 Oct 2021 05:43) (83 - 91)  BP: 129/60 (18 Oct 2021 05:43) (129/60 - 165/82)  BP(mean): --  RR: 16 (18 Oct 2021 05:43) (16 - 18)  SpO2: --      PHYSICAL EXAM: Alert & Oriented X3  GENERAL: NAD, well-groomed, well-developed  HEAD:  Atraumatic, Normocephalic  EYES: EOMI, PERRLA, conjunctiva and sclera clear  NECK: Supple, No JVD, Normal thyroid  CHEST/LUNG: Clear to percussion bilaterally; No rales, rhonchi, wheezing, or rubs  HEART: Regular rate and rhythm; No murmurs, rubs, or gallops  ABDOMEN: Soft, Nontender, Nondistended; Bowel sounds present  EXTREMITIES:  2+ Peripheral Pulses, No clubbing, cyanosis, or edema    NERVOUS SYSTEM:  Cranial Nerves 2-12 intact [x  ] Abnormal  [  ]  ROM: WFL all extremities [  x]  Abnormal [  ]  Motor Strength: WFL all extremities  [ x ]  Abnormal [  ]  Sensation: intact to light touch [ x ] Abnormal [  ]  Reflexes: Symmetric [ x ]  Abnormal [  ]    FUNCTIONAL STATUS:  Bed Mobility: Independent [  ]  Supervision [  ]  Needs Assistance [ x ]  N/A [  ]  Transfers: Independent [  ]  Supervision [  ]  Needs Assistance [ x ]  N/A [  ]   Ambulation: Independent [  ]  Supervision [  ]  Needs Assistance [x  ]  N/A [  ]  ADL: Independent [  ] Requires Assistance [  ] N/A [ x ]  SEE PT/OT IE NOTES    LABS:                        12.6   10.94 )-----------( 317      ( 17 Oct 2021 08:11 )             37.0     10-17    133<L>  |  98  |  41<H>  ----------------------------<  152<H>  3.7   |  17  |  1.6<H>    Ca    9.3      17 Oct 2021 08:11    TPro  7.0  /  Alb  4.0  /  TBili  0.7  /  DBili  x   /  AST  19  /  ALT  13  /  AlkPhos  83  10-17          RADIOLOGY & ADDITIONAL STUDIES:< from: CT Hip No Cont, Left (10.13.21 @ 15:51) >  Nondisplaced fracture of the left greater trochanter.    < end of copied text >      Assesment:

## 2021-10-18 NOTE — PROGRESS NOTE ADULT - SUBJECTIVE AND OBJECTIVE BOX
Patient is a 71y old  Male who presents with a chief complaint of fall     INTERVAL HPI/OVERNIGHT EVENTS:   afebrile   Hip pain improving  RUE, extending it up little more than before   Nausea/vomiting resolved  +  diarrhea   Abdominal dis improving                 PHYSICAL EXAM:   NAD; Normocephalic;   LUNGS - no wheezing  HEART: S1 S2+   ABDOMEN: Soft, Nontender, + distention BS nl   EXTREMITIES:  2+ Peripheral Pulses, No clubbing, cyanosis, or edema, ROM of left hip limited due to pain, tender to palpation, R ankle swelling is improving  R shoulder tenderness cannot extend more than 90     NERVOUS SYSTEM:  Awake and alert; no focal neuro deficits appreciated                                         12.6   10.94 )-----------( 317      ( 17 Oct 2021 08:11 )             37.0       10-17    133<L>  |  98  |  41<H>  ----------------------------<  152<H>  3.7   |  17  |  1.6<H>    Ca    9.3      17 Oct 2021 08:11    TPro  7.0  /  Alb  4.0  /  TBili  0.7  /  DBili  x   /  AST  19  /  ALT  13  /  AlkPhos  83  10-17                      Lactate Trend            CAPILLARY BLOOD GLUCOSE          MEDICATIONS  (STANDING):  amLODIPine   Tablet 10 milliGRAM(s) Oral daily  atorvastatin 40 milliGRAM(s) Oral at bedtime  dextrose 5% + sodium chloride 0.45%. 1000 milliLiter(s) (125 mL/Hr) IV Continuous <Continuous>  heparin   Injectable 5000 Unit(s) SubCutaneous every 8 hours  influenza   Vaccine 0.5 milliLiter(s) IntraMuscular once  pantoprazole  Injectable 40 milliGRAM(s) IV Push every 12 hours  senna 1 Tablet(s) Oral two times a day    MEDICATIONS  (PRN):  acetaminophen   Tablet .. 650 milliGRAM(s) Oral every 6 hours PRN Mild Pain (1 - 3)  ondansetron Injectable 4 milliGRAM(s) IV Push every 8 hours PRN Nausea and/or Vomiting  oxycodone    5 mG/acetaminophen 325 mG 1 Tablet(s) Oral every 4 hours PRN Moderate Pain (4 - 6)  simethicone 80 milliGRAM(s) Chew every 8 hours PRN Gas

## 2021-10-18 NOTE — PROGRESS NOTE ADULT - ASSESSMENT
70 y/o M PMHx HTN and HLD BIBA present to the Ed s/p fall. Found to have nondisplaced left hip fracture, not needing surgery, course complicated by narcotic induced Ileus             # Nasuea/ vomiting likely 2' ileus due to narcotics Vs mechanical SBO? Improving   No vomiting  + BMs  DC morphine   gen sx/ GI following     #Mechanical fall with left hip minimally displaced greater trochanter fx, no extension into intertrochanteric region  CT Left hip Nondisplaced fracture of the left greater trochanter.  Rest of trauma workup has been negative  - Pain control  Ortho c appreciated  No sx indicated     #Right ankle sprain s/p fall      R radial N paresis 2' fall  XR neg for shoulder dislocation     #HTN/HLD  - Cont amlodipine 10mg qD  - Cont atorvastatin 80mg qD    #WILL on CKD Stage 2' hypovolemia due to vomiting, improved    IVF      fever, resolved    CXR- neg   IS     DVT PPX, heparin SC

## 2021-10-18 NOTE — CONSULT NOTE ADULT - ASSESSMENT
IMPRESSION: Rehab of 70 y/o m  ptn  rehab  for  lt hip  fx  gd  sp  fall     PRECAUTIONS: [  ] Cardiac  [  ] Respiratory  [  ] Seizures [  ] Contact Isolation  [  ] Droplet Isolation  [ FALL ] Other    Weight Bearing Status: wbat lt  le     RECOMMENDATION:    Out of Bed to Chair     DVT/Decubiti Prophylaxis    REHAB PLAN:     [   xx] Bedside P/T 3-5 times a week   [   ]   Bedside O/T  2-3 times a week             [   ] No Rehab Therapy Indicated                   [   ]  Speech Therapy   Conditioning/ROM                                    ADL  Bed Mobility                                               Conditioning/ROM  Transfers                                                     Bed Mobility  Sitting /Standing Balance                         Transfers                                        Gait Training                                               Sitting/Standing Balance  Stair Training [   ]Applicable                    Home equipment Eval                                                                        Splinting  [   ] Only      GOALS:   ADL   [  x ]   Independent                    Transfers  [x ] Independent                          Ambulation  [x   ] Independent     [  x  ] With device                            [ x  ]  CG                                                         [x   ]  CG                                                                  [   x] CG                            [    ] Min A                                                   [   ] Min A                                                              [   ] Min  A          DISCHARGE PLAN:   [   ]  Good candidate for Intensive Rehabilitation/Hospital based-4A [facility]                                             Will tolerate 3hrs Intensive Rehab Daily                                       [  xx  ]  Short Term Rehab in Skilled Nursing Facility cont current care                                       [    ]  Home with Outpatient or VN services                                         [    ]  Possible Candidate for Intensive Hospital based Rehab

## 2021-10-18 NOTE — CONSULT NOTE ADULT - ATTENDING COMMENTS
pt seen  films reviewed    WBAT    assisted ambulation     no surgery needed
above noted discussed case with surgical resident and pt abdomen soft mild distension/tendernes ct scan and labs noted had bn today
I edited the note

## 2021-10-18 NOTE — OCCUPATIONAL THERAPY INITIAL EVALUATION ADULT - PERTINENT HX OF CURRENT PROBLEM, REHAB EVAL
Admitted for left hip, right shoulder pain, and contusions after falling down the 6 steps in front of his house s/p left hip displaced fracture and R shoulder pain

## 2021-10-18 NOTE — CONSULT NOTE ADULT - SUBJECTIVE AND OBJECTIVE BOX
Chief complaint/Reason for consult: sbo vs ileus    HPI:   70 y/o M PMHx HTN and HLD BIBA present to the Ed today s/p fall down 6 concrete steps in front of his house at 11am. Denies any LOC prior to event, fall was mechanical. Was helped up by daughter, but came to ED due to ongoing pain. Found to have left hip nondisplaced fracture. Denies chest pain, dyspnea, nausea/vomiting/abdominal pain, diarrhea, dysuria, recent fevers/chills. (13 Oct 2021 18:34)    GI Updates: 71yMale pmh HTN, HLD admitted with fall. Patient was receiving morphine for and now developed abdominal distention. Patient feels better today was not passing gas or having bowel movements since Friday. Currently Patient denies nausea, vomiting, hematemesis, melena, blood in stool, diarrhea, constipation, abdominal pain. patient reports he is passing flatus now and having small bowel movements.      PAST MEDICAL & SURGICAL HISTORY:  High blood cholesterol    Hypertension    GERD (gastroesophageal reflux disease)    No significant past surgical history          Family history:  FAMILY HISTORY:    No GI cancers in first or second degree relatives    Social History: No smoking. No alcohol. No illegal drug use.    Allergies:   No Known Allergies      MEDICATIONS: Home Medications:  amLODIPine 10 mg oral tablet: 1 tab(s) orally once a day (14 Oct 2021 07:32)  atorvastatin 40 mg oral tablet: 1 tab(s) orally once a day (at bedtime) (14 Oct 2021 07:32)    MEDICATIONS  (STANDING):  amLODIPine   Tablet 10 milliGRAM(s) Oral daily  atorvastatin 40 milliGRAM(s) Oral at bedtime  dextrose 5% + sodium chloride 0.45%. 1000 milliLiter(s) (125 mL/Hr) IV Continuous <Continuous>  heparin   Injectable 5000 Unit(s) SubCutaneous every 8 hours  influenza   Vaccine 0.5 milliLiter(s) IntraMuscular once  pantoprazole  Injectable 40 milliGRAM(s) IV Push every 12 hours  senna 1 Tablet(s) Oral two times a day    MEDICATIONS  (PRN):  acetaminophen   Tablet .. 650 milliGRAM(s) Oral every 6 hours PRN Mild Pain (1 - 3)  ondansetron Injectable 4 milliGRAM(s) IV Push every 8 hours PRN Nausea and/or Vomiting  oxycodone    5 mG/acetaminophen 325 mG 1 Tablet(s) Oral every 4 hours PRN Moderate Pain (4 - 6)  simethicone 80 milliGRAM(s) Chew every 8 hours PRN Gas        REVIEW OF SYSTEMS  General:  No weight loss, fevers, or chills.  Eyes:  No reported pain or visual changes  ENT:  No sore throat or runny nose.  NECK: No stiffness or lymphadenopathy  CV:  No chest pain or palpitations.  Resp:  No shortness of breath, cough, wheezing or hemoptysis  GI:  No abdominal pain, nausea, vomiting, dysphagia, diarrhea or constipation. No rectal bleeding, melena, or hematemesis.  Neuro:  No tingling, numbness       VITALS:   T(F): 98.5 (10-18-21 @ 05:43), Max: 99.3 (10-17-21 @ 21:05)  HR: 83 (10-18-21 @ 05:43) (83 - 91)  BP: 129/60 (10-18-21 @ 05:43) (129/60 - 165/82)  RR: 16 (10-18-21 @ 05:43) (16 - 18)  SpO2: --    PHYSICAL EXAM:  GENERAL: AAOx3, no acute distress.  HEAD:  Atraumatic, Normocephalic  EYES: conjunctiva and sclera clear  NECK: Supple, No thyromegaly   CHEST/LUNG: Clear to auscultation bilaterally; No wheeze, rhonchi, or rales  HEART: Regular rate and rhythm; normal S1, S2, No murmurs.  ABDOMEN: Soft, nontender, +distended; Bowel sounds present  NEUROLOGY: No asterixis or tremor  SKIN: Intact, no jaundice          LABS:  10-17    133<L>  |  98  |  41<H>  ----------------------------<  152<H>  3.7   |  17  |  1.6<H>    Ca    9.3      17 Oct 2021 08:11    TPro  7.0  /  Alb  4.0  /  TBili  0.7  /  DBili  x   /  AST  19  /  ALT  13  /  AlkPhos  83  10-17                          12.6   10.94 )-----------( 317      ( 17 Oct 2021 08:11 )             37.0     LIVER FUNCTIONS - ( 17 Oct 2021 08:11 )  Alb: 4.0 g/dL / Pro: 7.0 g/dL / ALK PHOS: 83 U/L / ALT: 13 U/L / AST: 19 U/L / GGT: x               IMAGING:    < from: CT Abdomen and Pelvis w/ Oral Cont (10.16.21 @ 23:03) >    EXAM:  CT ABDOMEN AND PELVIS OC            PROCEDURE DATE:  10/16/2021            INTERPRETATION:  CLINICAL STATEMENT: Abdominal pain.    TECHNIQUE: Contiguous axial CT images were obtained from the lower chest to the pubic symphysis without intravenous contrast.  Oral contrast was administered.  Reformatted images in the coronal and sagittal planes were acquired.    COMPARISON CT: Abdomen and pelvis dated 7/21/2020    OTHER STUDIES USED FOR CORRELATION: None.      FINDINGS:    LOWER CHEST: Unremarkable.    HEPATOBILIARY: Previously described hepatic dome hypodense lesion is not clearly delineated on this noncontrast examination. No intrahepatic biliary ductal dilatation. The gallbladder is present.    SPLEEN: Unremarkable.    PANCREAS: Unremarkable.    ADRENAL GLANDS: Unremarkable.    KIDNEYS: Lobulated appearance of the bilateral kidneys again noted. No hydronephrosis or obstructing calculus.    ABDOMINOPELVIC NODES: No abdominal pelvic lymphadenopathy.    PELVIC ORGANS: Under distention limits complete assessment of the urinary bladder.    PERITONEUM/MESENTERY/BOWEL: Multiple loops of dilated small bowel with air-fluid levels. Apparent transition point in the mid abdomen (series 4 image 252). Subsequently, the colon is distended as well with transition zone in the sigmoid colon (series 4 image 308).  Predominantly sigmoid diverticulosis without evidence of diverticulitis. Redemonstrated nodularity of the appendiceal tip (series 4 image 223), measuring up to 1.7 cm and not significantly changed from prior examination.    BONES/SOFT TISSUES: Degenerative changes of the visualized thoracal lumbar spine.    OTHER: Calcified atherosclerotic disease of the aorta and branch vessels      IMPRESSION:      Findings consistent with small bowel obstruction. Apparent transition point in the mid abdomen. Concurrent colonic distention with transition zone in the sigmoid colon.    Redemonstrated nodularity at the appendiceal tip, not significantly changed from prior examination.      Dr. Gurinder Rios discussed preliminary findings with DES JUNIOR MD on 10/17/2021 4:29 AM with readback.    --- End of Report ---            GURINDER RIOS MD; Resident Radiologist  This document has been electronically signed.  MYLES STINSON MD; Attending Radiologist  This document has been electronically signed. Oct 17 2021  5:47AM    < end of copied text >       Chief complaint/Reason for consult: sbo vs ileus    HPI:   72 y/o M PMHx HTN and HLD BIBA present to the Ed today s/p fall down 6 concrete steps in front of his house at 11am. Denies any LOC prior to event, fall was mechanical. Was helped up by daughter, but came to ED due to ongoing pain. Found to have left hip nondisplaced fracture. Denies chest pain, dyspnea, nausea/vomiting/abdominal pain, diarrhea, dysuria, recent fevers/chills. (13 Oct 2021 18:34)    GI Updates: 71yMale pmh HTN, HLD admitted with fall. Patient was receiving morphine for and now developed abdominal distention. Patient feels better today was not passing gas or having bowel movements since Friday. Currently Patient denies nausea, vomiting, hematemesis, melena, blood in stool, diarrhea, constipation, abdominal pain. patient reports he is passing flatus now and having small bowel movements.      PAST MEDICAL & SURGICAL HISTORY:  High blood cholesterol    Hypertension    GERD (gastroesophageal reflux disease)    No significant past surgical history          Family history:  FAMILY HISTORY:    No GI cancers in first or second degree relatives    Social History: No smoking. No alcohol. No illegal drug use.    Allergies:   No Known Allergies      MEDICATIONS: Home Medications:  amLODIPine 10 mg oral tablet: 1 tab(s) orally once a day (14 Oct 2021 07:32)  atorvastatin 40 mg oral tablet: 1 tab(s) orally once a day (at bedtime) (14 Oct 2021 07:32)    MEDICATIONS  (STANDING):  amLODIPine   Tablet 10 milliGRAM(s) Oral daily  atorvastatin 40 milliGRAM(s) Oral at bedtime  dextrose 5% + sodium chloride 0.45%. 1000 milliLiter(s) (125 mL/Hr) IV Continuous <Continuous>  heparin   Injectable 5000 Unit(s) SubCutaneous every 8 hours  influenza   Vaccine 0.5 milliLiter(s) IntraMuscular once  pantoprazole  Injectable 40 milliGRAM(s) IV Push every 12 hours  senna 1 Tablet(s) Oral two times a day    MEDICATIONS  (PRN):  acetaminophen   Tablet .. 650 milliGRAM(s) Oral every 6 hours PRN Mild Pain (1 - 3)  ondansetron Injectable 4 milliGRAM(s) IV Push every 8 hours PRN Nausea and/or Vomiting  oxycodone    5 mG/acetaminophen 325 mG 1 Tablet(s) Oral every 4 hours PRN Moderate Pain (4 - 6)  simethicone 80 milliGRAM(s) Chew every 8 hours PRN Gas        REVIEW OF SYSTEMS  General:  No weight loss, fevers, or chills.  Eyes:  No reported pain or visual changes  ENT:  No sore throat or runny nose.  NECK: No stiffness or lymphadenopathy  CV:  No chest pain or palpitations.  Resp:  No shortness of breath, cough, wheezing or hemoptysis  GI:  No abdominal pain, nausea, vomiting, dysphagia, diarrhea +constipation. No rectal bleeding, melena, or hematemesis.  Neuro:  No tingling, numbness       VITALS:   T(F): 98.5 (10-18-21 @ 05:43), Max: 99.3 (10-17-21 @ 21:05)  HR: 83 (10-18-21 @ 05:43) (83 - 91)  BP: 129/60 (10-18-21 @ 05:43) (129/60 - 165/82)  RR: 16 (10-18-21 @ 05:43) (16 - 18)  SpO2: --    PHYSICAL EXAM:  GENERAL: AAOx3, no acute distress.  HEAD:  Atraumatic, Normocephalic  EYES: conjunctiva and sclera clear  NECK: Supple, No thyromegaly   CHEST/LUNG: Clear to auscultation bilaterally; No wheeze, rhonchi, or rales  HEART: Regular rate and rhythm; normal S1, S2, No murmurs.  ABDOMEN: Soft, nontender, +distended; Bowel sounds present  NEUROLOGY: No asterixis or tremor  SKIN: Intact, no jaundice          LABS:  10-17    133<L>  |  98  |  41<H>  ----------------------------<  152<H>  3.7   |  17  |  1.6<H>    Ca    9.3      17 Oct 2021 08:11    TPro  7.0  /  Alb  4.0  /  TBili  0.7  /  DBili  x   /  AST  19  /  ALT  13  /  AlkPhos  83  10-17                          12.6   10.94 )-----------( 317      ( 17 Oct 2021 08:11 )             37.0     LIVER FUNCTIONS - ( 17 Oct 2021 08:11 )  Alb: 4.0 g/dL / Pro: 7.0 g/dL / ALK PHOS: 83 U/L / ALT: 13 U/L / AST: 19 U/L / GGT: x               IMAGING:    < from: CT Abdomen and Pelvis w/ Oral Cont (10.16.21 @ 23:03) >    EXAM:  CT ABDOMEN AND PELVIS OC            PROCEDURE DATE:  10/16/2021            INTERPRETATION:  CLINICAL STATEMENT: Abdominal pain.    TECHNIQUE: Contiguous axial CT images were obtained from the lower chest to the pubic symphysis without intravenous contrast.  Oral contrast was administered.  Reformatted images in the coronal and sagittal planes were acquired.    COMPARISON CT: Abdomen and pelvis dated 7/21/2020    OTHER STUDIES USED FOR CORRELATION: None.      FINDINGS:    LOWER CHEST: Unremarkable.    HEPATOBILIARY: Previously described hepatic dome hypodense lesion is not clearly delineated on this noncontrast examination. No intrahepatic biliary ductal dilatation. The gallbladder is present.    SPLEEN: Unremarkable.    PANCREAS: Unremarkable.    ADRENAL GLANDS: Unremarkable.    KIDNEYS: Lobulated appearance of the bilateral kidneys again noted. No hydronephrosis or obstructing calculus.    ABDOMINOPELVIC NODES: No abdominal pelvic lymphadenopathy.    PELVIC ORGANS: Under distention limits complete assessment of the urinary bladder.    PERITONEUM/MESENTERY/BOWEL: Multiple loops of dilated small bowel with air-fluid levels. Apparent transition point in the mid abdomen (series 4 image 252). Subsequently, the colon is distended as well with transition zone in the sigmoid colon (series 4 image 308).  Predominantly sigmoid diverticulosis without evidence of diverticulitis. Redemonstrated nodularity of the appendiceal tip (series 4 image 223), measuring up to 1.7 cm and not significantly changed from prior examination.    BONES/SOFT TISSUES: Degenerative changes of the visualized thoracal lumbar spine.    OTHER: Calcified atherosclerotic disease of the aorta and branch vessels      IMPRESSION:      Findings consistent with small bowel obstruction. Apparent transition point in the mid abdomen. Concurrent colonic distention with transition zone in the sigmoid colon.    Redemonstrated nodularity at the appendiceal tip, not significantly changed from prior examination.      Dr. Gurinder Rios discussed preliminary findings with DES JUNIOR MD on 10/17/2021 4:29 AM with readback.    --- End of Report ---            GURINDER RIOS MD; Resident Radiologist  This document has been electronically signed.  MYLES STINSON MD; Attending Radiologist  This document has been electronically signed. Oct 17 2021  5:47AM    < end of copied text >

## 2021-10-18 NOTE — PROGRESS NOTE ADULT - SUBJECTIVE AND OBJECTIVE BOX
DIAGNOSIS:   HOSPITAL DAY #:    STATUS POST:    POST OPERATIVE DAY #:     Vital Signs Last 24 Hrs  T(C): 36.1 (18 Oct 2021 21:32), Max: 36.9 (18 Oct 2021 05:43)  T(F): 96.9 (18 Oct 2021 21:32), Max: 98.5 (18 Oct 2021 05:43)  HR: 91 (18 Oct 2021 21:32) (83 - 91)  BP: 158/78 (18 Oct 2021 21:32) (129/60 - 158/78)  BP(mean): --  RR: 16 (18 Oct 2021 21:32) (16 - 16)  SpO2: --    SUBJECTIVE: Pt seen    Pain: YES  [ ]   NO [ ]   Nausea: [ ] YES [ ] NO           Vomiting: [ ] YES [ ] NO  Flatus: [ ] YES [ ] NO             Bowel Movement: [ ] YES [ ] NO     Void: [ ]YES [ ]No      HOLLI DRAINAGE: SIGNIFICANT [ ]   NOT SIGNIFICANT [ ]   NOT APPLICABLE [ ]  YES [ ] NO    General Appearance: Appears well, NAD  Neck: Supple  Chest: Equal expansion bilaterally, equal breath sounds  CV: Pulse regular presently  Abdomen: Soft [x ] YES [ ]NO  DISTENDED [x ] YES [ ] NO TENDERNESS [ ]YES [x ]NO  INCISIONS: HEALING WELL [ ] YES  [ ] NO ERYTHEMA [ ] YES [ ] NO DRAINAGE [ ] YES  [ ] NO  Extremities: Grossly symmetric, CALF TENDERNESS [ ] YES  [ ] NO      LABS:                        12.6   10.94 )-----------( 317      ( 17 Oct 2021 08:11 )             37.0     10-17    133<L>  |  98  |  41<H>  ----------------------------<  152<H>  3.7   |  17  |  1.6<H>    Ca    9.3      17 Oct 2021 08:11    TPro  7.0  /  Alb  4.0  /  TBili  0.7  /  DBili  x   /  AST  19  /  ALT  13  /  AlkPhos  83  10-17            ASSESSMENT: intestinal obstruction GI follow up noted    GOOD POST OP COURSE [ ]  YES  [ ] NO  CONDITION IMPROVING  []  YES  [ ]  NO          PLAN:    CONTINUE PRESENT MANAGEMENT  [ ] YES  [ ] NO

## 2021-10-18 NOTE — CONSULT NOTE ADULT - ASSESSMENT
71yMale pmh HTN, HLD admitted with fall. Patient was receiving morphine for and now developed abdominal distention. Patient feels better today was not passing gas or having bowel movements since Friday.    Problem 1-Findings consistent with small bowel obstruction. Apparent transition point in the mid abdomen. Concurrent colonic distention with transition zone in the sigmoid colon.  Rec  -surgical follow-up  -avoid narcotis  -IVF  -IF N/V NG tube  -No acute GI intervention  -await return of spontaneous bowel function  -follow abdominal x-rays  -Colonoscopy outpatient for anemia as patient has never had one    Problem 2-Redemonstrated nodularity at the appendiceal tip, not significantly changed from prior examination.  Rec  - Care as per primary team     Recall GI if needed  71yMale pmh HTN, HLD admitted with fall. Patient was receiving morphine for and now developed abdominal distention. Patient feels better today was not passing gas or having bowel movements since Friday.    Problem 1-Findings consistent with small bowel obstruction. Apparent transition point in the mid abdomen. Concurrent colonic distention with transition zone in the sigmoid colon.  Rec  -surgical follow-up  -avoid narcotis  -IVF  -IF N/V NG tube  -No acute GI intervention  -await return of spontaneous bowel function  -follow abdominal x-rays  -colonoscopy outpatient given transition point in sigmoid colon after SBO resolves     Problem 2-Redemonstrated nodularity at the appendiceal tip, not significantly changed from prior examination.  Rec  -Colonoscopy outpatient for anemia as patient has never had one, patient has private GI he wants to follow up with       71yMale pmh HTN, HLD admitted with fall. Patient was receiving morphine for and now developed abdominal distention. Patient feels better today was not passing gas or having bowel movements since Friday.    Problem 1-Findings consistent with small bowel obstruction. Apparent transition point in the mid abdomen. Concurrent colonic distention with transition zone in the sigmoid colon.  Rec  -surgical follow-up  -avoid narcotis  -IVF  -IF N/V NG tube  -No acute GI intervention  -await return of spontaneous bowel function  -follow abdominal x-rays  -colonoscopy outpatient given transition point in sigmoid colon after SBO resolves     Problem 2-Redemonstrated nodularity at the appendiceal tip, not significantly changed from prior examination.  Rec  -Colonoscopy outpatient for anemia as patient has never had one, patient has private GI he wants to follow up with    Recall GI if needed    71yMale pmh HTN, HLD admitted with fall. Patient was receiving morphine for and now developed abdominal distention. Patient feels better today was not passing gas or having bowel movements since Friday.    Problem 1-Findings consistent with small bowel obstruction. Apparent transition point in the mid abdomen. Concurrent colonic distention with transition zone in the sigmoid colon.  Rec  -surgical follow-up  -avoid narcotics  -IVF  -IF N/V NG tube  -No acute GI intervention  -await return of spontaneous bowel function  -follow abdominal x-rays  -colonoscopy outpatient given transition point in sigmoid colon after SBO resolves     Problem 2-Redemonstrated nodularity at the appendiceal tip, not significantly changed from prior examination.  Rec  -Colonoscopy outpatient for anemia as patient has never had one, patient has private GI he wants to follow up with    Recall GI if needed

## 2021-10-18 NOTE — OCCUPATIONAL THERAPY INITIAL EVALUATION ADULT - LIVES WITH, PROFILE
Lives in a private home with 10 steps to enter R side railing+ 21 stairs to reach bedroom+ tub shower with glass doors+ grab bars/spouse

## 2021-10-18 NOTE — OCCUPATIONAL THERAPY INITIAL EVALUATION ADULT - GENERAL OBSERVATIONS, REHAB EVAL
10:40-11:26 chart reviewed, ok to treat by Occupational Therapist as confirmed by RN, Pt received semi- smith in bed with daughter present + IV+ tele+ in NAD. Pt. in agreement with OT IE

## 2021-10-19 LAB
ALBUMIN SERPL ELPH-MCNC: 4 G/DL — SIGNIFICANT CHANGE UP (ref 3.5–5.2)
ALP SERPL-CCNC: 92 U/L — SIGNIFICANT CHANGE UP (ref 30–115)
ALT FLD-CCNC: 16 U/L — SIGNIFICANT CHANGE UP (ref 0–41)
AST SERPL-CCNC: 40 U/L — SIGNIFICANT CHANGE UP (ref 0–41)
BILIRUB SERPL-MCNC: SIGNIFICANT CHANGE UP MG/DL (ref 0.2–1.2)
BUN SERPL-MCNC: SIGNIFICANT CHANGE UP MG/DL (ref 10–20)
CALCIUM SERPL-MCNC: SIGNIFICANT CHANGE UP MG/DL (ref 8.5–10.1)
CHLORIDE SERPL-SCNC: 102 MMOL/L — SIGNIFICANT CHANGE UP (ref 98–110)
CO2 SERPL-SCNC: SIGNIFICANT CHANGE UP MMOL/L (ref 17–32)
CREAT SERPL-MCNC: 1.4 MG/DL — SIGNIFICANT CHANGE UP (ref 0.7–1.5)
GLUCOSE SERPL-MCNC: 104 MG/DL — HIGH (ref 70–99)
HCT VFR BLD CALC: 38.6 % — LOW (ref 42–52)
HGB BLD-MCNC: 13.1 G/DL — LOW (ref 14–18)
MCHC RBC-ENTMCNC: 29.6 PG — SIGNIFICANT CHANGE UP (ref 27–31)
MCHC RBC-ENTMCNC: 33.9 G/DL — SIGNIFICANT CHANGE UP (ref 32–37)
MCV RBC AUTO: 87.1 FL — SIGNIFICANT CHANGE UP (ref 80–94)
NEUTS BAND # BLD: 5 % — SIGNIFICANT CHANGE UP (ref 0–6)
NRBC # BLD: 0 /100 WBCS — SIGNIFICANT CHANGE UP (ref 0–0)
NRBC # BLD: 0 /100 — SIGNIFICANT CHANGE UP (ref 0–0)
PLAT MORPH BLD: NORMAL — SIGNIFICANT CHANGE UP
PLATELET # BLD AUTO: 108 K/UL — LOW (ref 130–400)
PLATELET COUNT - ESTIMATE: NORMAL — SIGNIFICANT CHANGE UP
POTASSIUM SERPL-MCNC: 5.1 MMOL/L — HIGH (ref 3.5–5)
POTASSIUM SERPL-SCNC: 5.1 MMOL/L — HIGH (ref 3.5–5)
PROT SERPL-MCNC: SIGNIFICANT CHANGE UP G/DL (ref 6–8)
RBC # BLD: 4.43 M/UL — LOW (ref 4.7–6.1)
RBC # FLD: 12.6 % — SIGNIFICANT CHANGE UP (ref 11.5–14.5)
RBC BLD AUTO: NORMAL — SIGNIFICANT CHANGE UP
SODIUM SERPL-SCNC: 133 MMOL/L — LOW (ref 135–146)
WBC # BLD: 3.7 K/UL — LOW (ref 4.8–10.8)
WBC # FLD AUTO: 3.7 K/UL — LOW (ref 4.8–10.8)

## 2021-10-19 PROCEDURE — 99232 SBSQ HOSP IP/OBS MODERATE 35: CPT

## 2021-10-19 PROCEDURE — 74018 RADEX ABDOMEN 1 VIEW: CPT | Mod: 26

## 2021-10-19 RX ORDER — LIDOCAINE 4 G/100G
1 CREAM TOPICAL DAILY
Refills: 0 | Status: DISCONTINUED | OUTPATIENT
Start: 2021-10-19 | End: 2021-10-26

## 2021-10-19 RX ORDER — KETOROLAC TROMETHAMINE 30 MG/ML
15 SYRINGE (ML) INJECTION ONCE
Refills: 0 | Status: DISCONTINUED | OUTPATIENT
Start: 2021-10-19 | End: 2021-10-19

## 2021-10-19 RX ORDER — ACETAMINOPHEN 500 MG
650 TABLET ORAL EVERY 6 HOURS
Refills: 0 | Status: DISCONTINUED | OUTPATIENT
Start: 2021-10-19 | End: 2021-10-24

## 2021-10-19 RX ADMIN — SIMETHICONE 80 MILLIGRAM(S): 80 TABLET, CHEWABLE ORAL at 11:38

## 2021-10-19 RX ADMIN — HEPARIN SODIUM 5000 UNIT(S): 5000 INJECTION INTRAVENOUS; SUBCUTANEOUS at 21:42

## 2021-10-19 RX ADMIN — OXYCODONE AND ACETAMINOPHEN 1 TABLET(S): 5; 325 TABLET ORAL at 03:10

## 2021-10-19 RX ADMIN — Medication 15 MILLIGRAM(S): at 18:30

## 2021-10-19 RX ADMIN — SENNA PLUS 1 TABLET(S): 8.6 TABLET ORAL at 17:02

## 2021-10-19 RX ADMIN — PANTOPRAZOLE SODIUM 40 MILLIGRAM(S): 20 TABLET, DELAYED RELEASE ORAL at 05:54

## 2021-10-19 RX ADMIN — AMLODIPINE BESYLATE 10 MILLIGRAM(S): 2.5 TABLET ORAL at 05:53

## 2021-10-19 RX ADMIN — HEPARIN SODIUM 5000 UNIT(S): 5000 INJECTION INTRAVENOUS; SUBCUTANEOUS at 14:39

## 2021-10-19 RX ADMIN — HEPARIN SODIUM 5000 UNIT(S): 5000 INJECTION INTRAVENOUS; SUBCUTANEOUS at 05:53

## 2021-10-19 RX ADMIN — ATORVASTATIN CALCIUM 40 MILLIGRAM(S): 80 TABLET, FILM COATED ORAL at 21:38

## 2021-10-19 RX ADMIN — PANTOPRAZOLE SODIUM 40 MILLIGRAM(S): 20 TABLET, DELAYED RELEASE ORAL at 17:03

## 2021-10-19 RX ADMIN — SIMETHICONE 80 MILLIGRAM(S): 80 TABLET, CHEWABLE ORAL at 03:10

## 2021-10-19 NOTE — PROGRESS NOTE ADULT - ASSESSMENT
72 y/o M PMHx HTN and HLD BIBA present to the Ed s/p fall. Found to have nondisplaced left hip fracture, not needing surgery, course complicated by narcotic induced Ileus             # Nasuea/ vomiting likely 2' ileus due to narcotics Vs mechanical SBO? Improving   No vomiting  + BMs  DC morphine   gen sx/ GI following     #Mechanical fall with left hip minimally displaced greater trochanter fx, no extension into intertrochanteric region  CT Left hip Nondisplaced fracture of the left greater trochanter.  Rest of trauma workup has been negative  - Pain control  Ortho c appreciated  No sx indicated     #Right ankle sprain s/p fall      R radial N paresis 2' fall  XR neg for shoulder dislocation     #HTN/HLD  - Cont amlodipine 10mg qD  - Cont atorvastatin 80mg qD    #WILL on CKD Stage 2' hypovolemia due to vomiting, improved    IVF      fever, resolved    CXR- neg   IS     DVT PPX, heparin SC

## 2021-10-19 NOTE — PROGRESS NOTE ADULT - ASSESSMENT
72 y/o M PMHx HTN and HLD BIBA present to the Ed s/p fall. Found to have nondisplaced left hip fracture, not needing surgery, course complicated by narcotic induced Ileus             # Nasuea/ vomiting likely 2' ileus due to narcotics Vs mechanical SBO? slowly Improving   No vomiting  + BMs  Avoid narcotics   gen sx/ GI following     #Mechanical fall with left hip minimally displaced greater trochanter fx, no extension into intertrochanteric region  CT Left hip Nondisplaced fracture of the left greater trochanter.  Rest of trauma workup has been negative  - Pain control  Ortho c appreciated  No sx indicated     #Right ankle sprain s/p fall      R radial N paresis 2' fall  XR neg for shoulder dislocation     #HTN/HLD  - Cont amlodipine 10mg qD  - Cont atorvastatin 80mg qD    #WILL on CKD Stage 2' hypovolemia due to vomiting, improved    IVF      fever, resolved    CXR- neg   IS     DVT PPX, heparin SC

## 2021-10-20 LAB
ALBUMIN SERPL ELPH-MCNC: 3.8 G/DL — SIGNIFICANT CHANGE UP (ref 3.5–5.2)
ALP SERPL-CCNC: 82 U/L — SIGNIFICANT CHANGE UP (ref 30–115)
ALT FLD-CCNC: 17 U/L — SIGNIFICANT CHANGE UP (ref 0–41)
ANION GAP SERPL CALC-SCNC: 15 MMOL/L — HIGH (ref 7–14)
AST SERPL-CCNC: 20 U/L — SIGNIFICANT CHANGE UP (ref 0–41)
BILIRUB SERPL-MCNC: 0.6 MG/DL — SIGNIFICANT CHANGE UP (ref 0.2–1.2)
BUN SERPL-MCNC: 24 MG/DL — HIGH (ref 10–20)
CALCIUM SERPL-MCNC: 9.5 MG/DL — SIGNIFICANT CHANGE UP (ref 8.5–10.1)
CHLORIDE SERPL-SCNC: 97 MMOL/L — LOW (ref 98–110)
CO2 SERPL-SCNC: 18 MMOL/L — SIGNIFICANT CHANGE UP (ref 17–32)
CREAT SERPL-MCNC: 1 MG/DL — SIGNIFICANT CHANGE UP (ref 0.7–1.5)
GLUCOSE SERPL-MCNC: 108 MG/DL — HIGH (ref 70–99)
HCT VFR BLD CALC: 32.8 % — LOW (ref 42–52)
HGB BLD-MCNC: 11.2 G/DL — LOW (ref 14–18)
MCHC RBC-ENTMCNC: 29.4 PG — SIGNIFICANT CHANGE UP (ref 27–31)
MCHC RBC-ENTMCNC: 34.1 G/DL — SIGNIFICANT CHANGE UP (ref 32–37)
MCV RBC AUTO: 86.1 FL — SIGNIFICANT CHANGE UP (ref 80–94)
NRBC # BLD: 0 /100 WBCS — SIGNIFICANT CHANGE UP (ref 0–0)
PLATELET # BLD AUTO: 393 K/UL — SIGNIFICANT CHANGE UP (ref 130–400)
POTASSIUM SERPL-MCNC: 3.5 MMOL/L — SIGNIFICANT CHANGE UP (ref 3.5–5)
POTASSIUM SERPL-SCNC: 3.5 MMOL/L — SIGNIFICANT CHANGE UP (ref 3.5–5)
PROT SERPL-MCNC: 7 G/DL — SIGNIFICANT CHANGE UP (ref 6–8)
RBC # BLD: 3.81 M/UL — LOW (ref 4.7–6.1)
RBC # FLD: 12.7 % — SIGNIFICANT CHANGE UP (ref 11.5–14.5)
SODIUM SERPL-SCNC: 130 MMOL/L — LOW (ref 135–146)
WBC # BLD: 5 K/UL — SIGNIFICANT CHANGE UP (ref 4.8–10.8)
WBC # FLD AUTO: 5 K/UL — SIGNIFICANT CHANGE UP (ref 4.8–10.8)

## 2021-10-20 PROCEDURE — 71045 X-RAY EXAM CHEST 1 VIEW: CPT | Mod: 26

## 2021-10-20 PROCEDURE — 99233 SBSQ HOSP IP/OBS HIGH 50: CPT

## 2021-10-20 PROCEDURE — 74018 RADEX ABDOMEN 1 VIEW: CPT | Mod: 26

## 2021-10-20 RX ORDER — OXYCODONE AND ACETAMINOPHEN 5; 325 MG/1; MG/1
1 TABLET ORAL ONCE
Refills: 0 | Status: DISCONTINUED | OUTPATIENT
Start: 2021-10-20 | End: 2021-10-20

## 2021-10-20 RX ORDER — LIDOCAINE 4 G/100G
1 CREAM TOPICAL ONCE
Refills: 0 | Status: COMPLETED | OUTPATIENT
Start: 2021-10-20 | End: 2021-10-20

## 2021-10-20 RX ORDER — IOHEXOL 300 MG/ML
30 INJECTION, SOLUTION INTRAVENOUS ONCE
Refills: 0 | Status: COMPLETED | OUTPATIENT
Start: 2021-10-20 | End: 2021-10-20

## 2021-10-20 RX ORDER — SODIUM CHLORIDE 9 MG/ML
1000 INJECTION, SOLUTION INTRAVENOUS
Refills: 0 | Status: DISCONTINUED | OUTPATIENT
Start: 2021-10-20 | End: 2021-10-23

## 2021-10-20 RX ADMIN — PANTOPRAZOLE SODIUM 40 MILLIGRAM(S): 20 TABLET, DELAYED RELEASE ORAL at 06:00

## 2021-10-20 RX ADMIN — LIDOCAINE 1 PATCH: 4 CREAM TOPICAL at 02:23

## 2021-10-20 RX ADMIN — HEPARIN SODIUM 5000 UNIT(S): 5000 INJECTION INTRAVENOUS; SUBCUTANEOUS at 14:59

## 2021-10-20 RX ADMIN — HEPARIN SODIUM 5000 UNIT(S): 5000 INJECTION INTRAVENOUS; SUBCUTANEOUS at 06:00

## 2021-10-20 RX ADMIN — SODIUM CHLORIDE 75 MILLILITER(S): 9 INJECTION, SOLUTION INTRAVENOUS at 15:00

## 2021-10-20 RX ADMIN — OXYCODONE AND ACETAMINOPHEN 1 TABLET(S): 5; 325 TABLET ORAL at 04:49

## 2021-10-20 RX ADMIN — IOHEXOL 30 MILLILITER(S): 300 INJECTION, SOLUTION INTRAVENOUS at 22:08

## 2021-10-20 RX ADMIN — SENNA PLUS 1 TABLET(S): 8.6 TABLET ORAL at 06:00

## 2021-10-20 RX ADMIN — HEPARIN SODIUM 5000 UNIT(S): 5000 INJECTION INTRAVENOUS; SUBCUTANEOUS at 21:15

## 2021-10-20 RX ADMIN — SIMETHICONE 80 MILLIGRAM(S): 80 TABLET, CHEWABLE ORAL at 02:35

## 2021-10-20 RX ADMIN — PANTOPRAZOLE SODIUM 40 MILLIGRAM(S): 20 TABLET, DELAYED RELEASE ORAL at 17:42

## 2021-10-20 RX ADMIN — AMLODIPINE BESYLATE 10 MILLIGRAM(S): 2.5 TABLET ORAL at 05:59

## 2021-10-20 NOTE — PROGRESS NOTE ADULT - ASSESSMENT
ASSESSMENT:    Patient still markedly distended after many days of observation without NGT. Patient is not nauseas, or vomiting. patient was given clears and tolerating. passing gas and having bowel movements daily, but still very distended. This is likely an ileus. Decision made to place NGT, and keep NPO. Will follow    PLAN:  - Strict i/o  - NPO, IVF  - NGT to low continuous suction  - Daily KUBs  - Monitor bowel function    Date/Time: 10-20-21 @ 16:47 ASSESSMENT:    Patient still markedly distended after many days of observation without NGT. Patient is not nauseas, or vomiting. patient was given clears and tolerating. passing gas and having bowel movements daily, but still very distended. This is likely an ileus. Decision made to place NGT, and keep NPO. Will follow    PLAN:  - Strict i/o  - NPO, IVF  - NGT to low continuous suction  - Daily KUBs  - Monitor bowel function  - CT Abdomen and Pelvis with IV and PO contrast, 4 hour delay PO contrast    Date/Time: 10-20-21 @ 16:47

## 2021-10-20 NOTE — PROGRESS NOTE ADULT - SUBJECTIVE AND OBJECTIVE BOX
71yMale  Being followed for SBO  Interval history: Patient denies nausea, vomiting, hematemesis, melena, blood in stool,  constipation, abdominal pain. Patient passing gas but having very small pasty bowel movements that are loose and watery.      PAST MEDICAL & SURGICAL HISTORY:  High blood cholesterol    Hypertension    GERD (gastroesophageal reflux disease)    No significant past surgical history            Social History: No smoking. No alcohol. No illegal drug use.            MEDICATIONS  (STANDING):  amLODIPine   Tablet 10 milliGRAM(s) Oral daily  atorvastatin 40 milliGRAM(s) Oral at bedtime  dextrose 5% + sodium chloride 0.45%. 1000 milliLiter(s) (75 mL/Hr) IV Continuous <Continuous>  heparin   Injectable 5000 Unit(s) SubCutaneous every 8 hours  influenza   Vaccine 0.5 milliLiter(s) IntraMuscular once  lidocaine   4% Patch 1 Patch Transdermal daily  pantoprazole  Injectable 40 milliGRAM(s) IV Push every 12 hours  senna 1 Tablet(s) Oral two times a day    MEDICATIONS  (PRN):  acetaminophen   Tablet .. 650 milliGRAM(s) Oral every 6 hours PRN Mild Pain (1 - 3)  acetaminophen   Tablet .. 650 milliGRAM(s) Oral every 6 hours PRN Temp greater or equal to 38.5C (101.3F), Mild Pain (1 - 3), Moderate Pain (4 - 6), Severe Pain (7 - 10)  ondansetron Injectable 4 milliGRAM(s) IV Push every 8 hours PRN Nausea and/or Vomiting  simethicone 80 milliGRAM(s) Chew every 8 hours PRN Gas      Allergies:   No Known Allergies          REVIEW OF SYSTEMS:  General:  No weight loss, fevers, or chills.  Eyes:  No reported pain or visual changes  ENT:  No sore throat or runny nose.  NECK: No stiffness   CV:  No chest pain or palpitations.  Resp:  No shortness of breath, cough  GI:  No abdominal pain, nausea, vomiting, dysphagia, diarrhea or constipation. No rectal bleeding, melena, or hematemesis.  Neuro:  No tingling, numbness       VITAL SIGNS:   T(F): 98.7 (10-20-21 @ 05:36), Max: 98.8 (10-19-21 @ 22:32)  HR: 80 (10-20-21 @ 05:36) (75 - 85)  BP: 140/79 (10-20-21 @ 05:36) (135/66 - 150/71)  RR: 16 (10-20-21 @ 05:36) (16 - 16)  SpO2: --    PHYSICAL EXAM:  GENERAL: AAOx3, no acute distress.  HEAD:  Atraumatic, Normocephalic  EYES: conjunctiva and sclera clear  NECK: Supple, no JVD or thyromegaly  CHEST/LUNG: Clear to auscultation bilaterally; No wheeze, rhonchi, or rales  HEART: Regular rate and rhythm; normal S1, S2, No murmurs.  ABDOMEN: Soft, nontender, +distended; Bowel sounds present  NEUROLOGY: No asterixis or tremor.   SKIN: Intact, no jaundice            LABS:                        11.2   5.00  )-----------( 393      ( 20 Oct 2021 08:59 )             32.8     10-19    133<L>  |  102  |  QNS  ----------------------------<  104<H>  5.1<H>   |  QNS  |  1.4    Ca    QNS      19 Oct 2021 15:16    TPro  QNS  /  Alb  4.0  /  TBili  QNS  /  DBili  x   /  AST  40  /  ALT  16  /  AlkPhos  92  10-19    LIVER FUNCTIONS - ( 19 Oct 2021 15:16 )  Alb: 4.0 g/dL / Pro: QNS g/dL / ALK PHOS: 92 U/L / ALT: 16 U/L / AST: 40 U/L / GGT: x               IMAGING:      < from: CT Abdomen and Pelvis w/ Oral Cont (10.16.21 @ 23:03) >    EXAM:  CT ABDOMEN AND PELVIS OC            PROCEDURE DATE:  10/16/2021            INTERPRETATION:  CLINICAL STATEMENT: Abdominal pain.    TECHNIQUE: Contiguous axial CT images were obtained from the lower chest to the pubic symphysis without intravenous contrast.  Oral contrast was administered.  Reformatted images in the coronal and sagittal planes were acquired.    COMPARISON CT: Abdomen and pelvis dated 7/21/2020    OTHER STUDIES USED FOR CORRELATION: None.      FINDINGS:    LOWER CHEST: Unremarkable.    HEPATOBILIARY: Previously described hepatic dome hypodense lesion is not clearly delineated on this noncontrast examination. No intrahepatic biliary ductal dilatation. The gallbladder is present.    SPLEEN: Unremarkable.    PANCREAS: Unremarkable.    ADRENAL GLANDS: Unremarkable.    KIDNEYS: Lobulated appearance of the bilateral kidneys again noted. No hydronephrosis or obstructing calculus.    ABDOMINOPELVIC NODES: No abdominal pelvic lymphadenopathy.    PELVIC ORGANS: Under distention limits complete assessment of the urinary bladder.    PERITONEUM/MESENTERY/BOWEL: Multiple loops of dilated small bowel with air-fluid levels. Apparent transition point in the mid abdomen (series 4 image 252). Subsequently, the colon is distended as well with transition zone in the sigmoid colon (series 4 image 308).  Predominantly sigmoid diverticulosis without evidence of diverticulitis. Redemonstrated nodularity of the appendiceal tip (series 4 image 223), measuring up to 1.7 cm and not significantly changed from prior examination.    BONES/SOFT TISSUES: Degenerative changes of the visualized thoracal lumbar spine.    OTHER: Calcified atherosclerotic disease of the aorta and branch vessels      IMPRESSION:      Findings consistent with small bowel obstruction. Apparent transition point in the mid abdomen. Concurrent colonic distention with transition zone in the sigmoid colon.    Redemonstrated nodularity at the appendiceal tip, not significantly changed from prior examination.      Dr. Gurinder Rios discussed preliminary findings with DES JUNIOR MD on 10/17/2021 4:29 AM with readback.    --- End of Report ---            GURINDER RIOS MD; Resident Radiologist  This document has been electronically signed.  MYLES STINSON MD; Attending Radiologist  This document has been electronically signed. Oct 17 2021  5:47AM    < end of copied text >      < from: Xray Kidney Ureter Bladder (10.19.21 @ 19:03) >    EXAM:  XR KUB 1 VIEW            PROCEDURE DATE:  10/19/2021            INTERPRETATION:  CLINICAL HISTORY:Small bowel obstruction    COMPARISON: October 18, 2021.    TECHNIQUE: Supine abdominal radiograph    FINDINGS/  IMPRESSION:  Multiple dilated gas-filled small bowel loops again seen throughout the abdomen, compatible with known small bowel obstruction. Gas-filled dilated proximal colon also noted. Limited evaluation for pneumoperitoneum due to supine technique. Stable visualized osseous structures.    --- End of Report ---              KRIS QUEZADA MD; Attending Radiologist  This document has been electronically signed. Oct 20 2021  9:25AM    < end of copied text >

## 2021-10-20 NOTE — PROGRESS NOTE ADULT - ASSESSMENT
71yMale pmh HTN, HLD admitted with fall. Patient was receiving morphine for and now developed abdominal distention. Patient feels better today was not passing gas or having bowel movements since Friday.    Problem 1-Findings consistent with small bowel obstruction. Apparent transition point in the mid abdomen. Concurrent colonic distention with transition zone in the sigmoid colon.  Rec  -surgical follow-up  -avoid narcotics  -IVF  -IF N/V NG tube  -No acute GI intervention  -await return of spontaneous bowel function  -follow abdominal x-rays  -colonoscopy outpatient given transition point in sigmoid colon after SBO resolves     Problem 2-Redemonstrated nodularity at the appendiceal tip, not significantly changed from prior examination.  Rec  -Colonoscopy outpatient for anemia as patient has never had one, patient has private GI he wants to follow up with    Recall GI if needed    71yMale pmh HTN, HLD admitted with fall. Patient was receiving morphine for and now developed abdominal distention. Patient feels better today was not passing gas or having bowel movements since Friday.    Problem 1-Findings consistent with small bowel obstruction. Apparent transition point in the mid abdomen. Concurrent colonic distention with transition zone in the sigmoid colon.  Rec  -surgical follow-up, patient with persistent small bowel obstruction, patient and family awaiting Dr. Pena follow-up  -avoid narcotics  -IVF  -IF N/V NG tube  -No acute GI intervention  -await return of spontaneous bowel function  -follow abdominal x-rays  -colonoscopy outpatient given transition point in sigmoid colon after SBO resolves     Problem 2-Redemonstrated nodularity at the appendiceal tip, not significantly changed from prior examination.  Rec  -Colonoscopy outpatient for anemia as patient has never had one, patient has private GI he wants to follow up with    Recall GI if needed

## 2021-10-20 NOTE — PROGRESS NOTE ADULT - SUBJECTIVE AND OBJECTIVE BOX
GENERAL SURGERY PROGRESS NOTE    Patient: YUMIKO GALINDO , 71y ([birth year])Male   MRN: [id number]  Location: [address] [place] [address]  Visit: 10-13-21 Inpatient  Date: 10-20-21 @ 16:47    Patient still markedly distended after many days of observation without NGT. Patient is not nauseas, or vomiting. patient was given clears and tolerating. passing gas and having bowel movements daily, but still very distended. This is likely an ileus. Decision made to place NGT, and keep NPO. Will follow    PAST MEDICAL & SURGICAL HISTORY:  High blood cholesterol    Hypertension    GERD (gastroesophageal reflux disease)    No significant past surgical history        Vitals:   T(F): 96.3 (10-20-21 @ 14:09), Max: 98.8 (10-19-21 @ 22:32)  HR: 80 (10-20-21 @ 14:09)  BP: 138/83 (10-20-21 @ 14:09)  RR: 16 (10-20-21 @ 14:09)  SpO2: --      Diet, NPO:   Except Medications      Fluids:       PHYSICAL EXAM:  General Appearance: NAD  HEENT: EOMI, sclera non-icteric.  Heart: RRR   Lungs: CTABL.   Abdomen:  Soft, nontender, markedly distended   MSK/Extremities: Warm & well-perfused.   Skin: Warm, dry. No jaundice.       MEDICATIONS  (STANDING):  amLODIPine   Tablet 10 milliGRAM(s) Oral daily  atorvastatin 40 milliGRAM(s) Oral at bedtime  dextrose 5% + sodium chloride 0.9%. 1000 milliLiter(s) (75 mL/Hr) IV Continuous <Continuous>  heparin   Injectable 5000 Unit(s) SubCutaneous every 8 hours  influenza   Vaccine 0.5 milliLiter(s) IntraMuscular once  lidocaine   4% Patch 1 Patch Transdermal daily  pantoprazole  Injectable 40 milliGRAM(s) IV Push every 12 hours  senna 1 Tablet(s) Oral two times a day    MEDICATIONS  (PRN):  acetaminophen   Tablet .. 650 milliGRAM(s) Oral every 6 hours PRN Mild Pain (1 - 3)  acetaminophen   Tablet .. 650 milliGRAM(s) Oral every 6 hours PRN Temp greater or equal to 38.5C (101.3F), Mild Pain (1 - 3), Moderate Pain (4 - 6), Severe Pain (7 - 10)  ondansetron Injectable 4 milliGRAM(s) IV Push every 8 hours PRN Nausea and/or Vomiting  simethicone 80 milliGRAM(s) Chew every 8 hours PRN Gas      DVT PROPHYLAXIS: heparin   Injectable 5000 Unit(s) SubCutaneous every 8 hours    GI PROPHYLAXIS: pantoprazole  Injectable 40 milliGRAM(s) IV Push every 12 hours    ANTICOAGULATION:   ANTIBIOTICS:            LAB/STUDIES:  Labs:  CAPILLARY BLOOD GLUCOSE                              11.2   5.00  )-----------( 393      ( 20 Oct 2021 08:59 )             32.8         10-20    130<L>  |  97<L>  |  24<H>  ----------------------------<  108<H>  3.5   |  18  |  1.0      Calcium, Total Serum: 9.5 mg/dL (10-20-21 @ 08:59)      LFTs:             7.0  | 0.6  | 20       ------------------[82      ( 20 Oct 2021 08:59 )  3.8  | x    | 17          Lipase:x      Amylase:x             Coags:                        IMAGING:  < from: Xray Kidney Ureter Bladder (10.20.21 @ 11:50) >  FINDINGS:  Increased gas-filled, dilated loops of small bowel throughout the abdomen, not significantly changed. Gaseous distention of the proximal colon again noted. Limited evaluation for pneumoperitoneum.    Stable visualized osseous structures.    IMPRESSION:  Persistent small bowel obstruction with increased gaseous distention of small bowel loops throughout the abdomen.    --- End of Report ---    < end of copied text >

## 2021-10-20 NOTE — PROGRESS NOTE ADULT - ASSESSMENT
70 y/o M PMHx HTN and HLD BIBA present to the Ed s/p fall. Found to have nondisplaced left hip fracture, not needing surgery, course complicated by narcotic induced Ileus             # Nasuea/ vomiting likely 2' ileus due to narcotics Vs mechanical SBO?   NG placement today   Avoid narcotics        #Mechanical fall with left hip minimally displaced greater trochanter fx, no extension into intertrochanteric region  CT Left hip Nondisplaced fracture of the left greater trochanter.  Rest of trauma workup has been negative  - Pain control  Ortho c appreciated  No sx indicated per ortho     #Right ankle sprain s/p fall      R radial N paresis 2' fall  XR neg for shoulder dislocation     #HTN/HLD  - Cont amlodipine 10mg qD  - Cont atorvastatin 80mg qD    #WILL on CKD Stage 2' hypovolemia due to vomiting, improved    IVF      fever, resolved    CXR- neg   IS     DVT PPX, heparin SC

## 2021-10-20 NOTE — PROGRESS NOTE ADULT - SUBJECTIVE AND OBJECTIVE BOX
Patient is a 71y old  Male who presents with a chief complaint of fall     INTERVAL HPI/OVERNIGHT EVENTS:     No n/v  Abdomen is distended  No pain   Walking with PT                 PHYSICAL EXAM:   NAD; Normocephalic;   LUNGS - no wheezing  HEART: S1 S2+   ABDOMEN: Soft, Nontender, + distention BS nl   EXTREMITIES:  2+ Peripheral Pulses, No clubbing, cyanosis, or edema, ROM of left hip limited due to pain, tender to palpation, R ankle swelling is improving  R shoulder tenderness cannot extend more than 90     NERVOUS SYSTEM:  Awake and alert; no focal neuro deficits appreciated                                                    11.2   5.00  )-----------( 393      ( 20 Oct 2021 08:59 )             32.8       10-20    130<L>  |  97<L>  |  24<H>  ----------------------------<  108<H>  3.5   |  18  |  1.0    Ca    9.5      20 Oct 2021 08:59    TPro  7.0  /  Alb  3.8  /  TBili  0.6  /  DBili  x   /  AST  20  /  ALT  17  /  AlkPhos  82  10-20                      Lactate Trend            CAPILLARY BLOOD GLUCOSE                              Lactate Trend            CAPILLARY BLOOD GLUCOSE          MEDICATIONS  (STANDING):  amLODIPine   Tablet 10 milliGRAM(s) Oral daily  atorvastatin 40 milliGRAM(s) Oral at bedtime  dextrose 5% + sodium chloride 0.45%. 1000 milliLiter(s) (125 mL/Hr) IV Continuous <Continuous>  heparin   Injectable 5000 Unit(s) SubCutaneous every 8 hours  influenza   Vaccine 0.5 milliLiter(s) IntraMuscular once  pantoprazole  Injectable 40 milliGRAM(s) IV Push every 12 hours  senna 1 Tablet(s) Oral two times a day    MEDICATIONS  (PRN):  acetaminophen   Tablet .. 650 milliGRAM(s) Oral every 6 hours PRN Mild Pain (1 - 3)  ondansetron Injectable 4 milliGRAM(s) IV Push every 8 hours PRN Nausea and/or Vomiting  oxycodone    5 mG/acetaminophen 325 mG 1 Tablet(s) Oral every 4 hours PRN Moderate Pain (4 - 6)  simethicone 80 milliGRAM(s) Chew every 8 hours PRN Gas

## 2021-10-21 LAB
ALBUMIN SERPL ELPH-MCNC: 3.8 G/DL — SIGNIFICANT CHANGE UP (ref 3.5–5.2)
ALP SERPL-CCNC: 78 U/L — SIGNIFICANT CHANGE UP (ref 30–115)
ALT FLD-CCNC: 29 U/L — SIGNIFICANT CHANGE UP (ref 0–41)
ANION GAP SERPL CALC-SCNC: 13 MMOL/L — SIGNIFICANT CHANGE UP (ref 7–14)
AST SERPL-CCNC: 29 U/L — SIGNIFICANT CHANGE UP (ref 0–41)
BILIRUB SERPL-MCNC: 0.6 MG/DL — SIGNIFICANT CHANGE UP (ref 0.2–1.2)
BUN SERPL-MCNC: 21 MG/DL — HIGH (ref 10–20)
CALCIUM SERPL-MCNC: 9.1 MG/DL — SIGNIFICANT CHANGE UP (ref 8.5–10.1)
CHLORIDE SERPL-SCNC: 100 MMOL/L — SIGNIFICANT CHANGE UP (ref 98–110)
CO2 SERPL-SCNC: 22 MMOL/L — SIGNIFICANT CHANGE UP (ref 17–32)
CREAT SERPL-MCNC: 1.1 MG/DL — SIGNIFICANT CHANGE UP (ref 0.7–1.5)
GLUCOSE SERPL-MCNC: 115 MG/DL — HIGH (ref 70–99)
HCT VFR BLD CALC: 32.8 % — LOW (ref 42–52)
HGB BLD-MCNC: 11 G/DL — LOW (ref 14–18)
MCHC RBC-ENTMCNC: 29.3 PG — SIGNIFICANT CHANGE UP (ref 27–31)
MCHC RBC-ENTMCNC: 33.5 G/DL — SIGNIFICANT CHANGE UP (ref 32–37)
MCV RBC AUTO: 87.2 FL — SIGNIFICANT CHANGE UP (ref 80–94)
NRBC # BLD: 0 /100 WBCS — SIGNIFICANT CHANGE UP (ref 0–0)
PLATELET # BLD AUTO: 383 K/UL — SIGNIFICANT CHANGE UP (ref 130–400)
POTASSIUM SERPL-MCNC: 3.5 MMOL/L — SIGNIFICANT CHANGE UP (ref 3.5–5)
POTASSIUM SERPL-SCNC: 3.5 MMOL/L — SIGNIFICANT CHANGE UP (ref 3.5–5)
PROT SERPL-MCNC: 6.5 G/DL — SIGNIFICANT CHANGE UP (ref 6–8)
RBC # BLD: 3.76 M/UL — LOW (ref 4.7–6.1)
RBC # FLD: 12.5 % — SIGNIFICANT CHANGE UP (ref 11.5–14.5)
SODIUM SERPL-SCNC: 135 MMOL/L — SIGNIFICANT CHANGE UP (ref 135–146)
WBC # BLD: 4.87 K/UL — SIGNIFICANT CHANGE UP (ref 4.8–10.8)
WBC # FLD AUTO: 4.87 K/UL — SIGNIFICANT CHANGE UP (ref 4.8–10.8)

## 2021-10-21 PROCEDURE — 74177 CT ABD & PELVIS W/CONTRAST: CPT | Mod: 26

## 2021-10-21 PROCEDURE — 71045 X-RAY EXAM CHEST 1 VIEW: CPT | Mod: 26

## 2021-10-21 PROCEDURE — 74018 RADEX ABDOMEN 1 VIEW: CPT | Mod: 26

## 2021-10-21 PROCEDURE — 99232 SBSQ HOSP IP/OBS MODERATE 35: CPT

## 2021-10-21 RX ORDER — OXYCODONE AND ACETAMINOPHEN 5; 325 MG/1; MG/1
1 TABLET ORAL EVERY 4 HOURS
Refills: 0 | Status: DISCONTINUED | OUTPATIENT
Start: 2021-10-21 | End: 2021-10-26

## 2021-10-21 RX ORDER — HYDROXYZINE HCL 10 MG
50 TABLET ORAL ONCE
Refills: 0 | Status: COMPLETED | OUTPATIENT
Start: 2021-10-21 | End: 2021-10-21

## 2021-10-21 RX ORDER — KETOROLAC TROMETHAMINE 30 MG/ML
15 SYRINGE (ML) INJECTION ONCE
Refills: 0 | Status: DISCONTINUED | OUTPATIENT
Start: 2021-10-21 | End: 2021-10-21

## 2021-10-21 RX ADMIN — PANTOPRAZOLE SODIUM 40 MILLIGRAM(S): 20 TABLET, DELAYED RELEASE ORAL at 18:00

## 2021-10-21 RX ADMIN — HEPARIN SODIUM 5000 UNIT(S): 5000 INJECTION INTRAVENOUS; SUBCUTANEOUS at 14:23

## 2021-10-21 RX ADMIN — OXYCODONE AND ACETAMINOPHEN 1 TABLET(S): 5; 325 TABLET ORAL at 13:01

## 2021-10-21 RX ADMIN — PANTOPRAZOLE SODIUM 40 MILLIGRAM(S): 20 TABLET, DELAYED RELEASE ORAL at 05:49

## 2021-10-21 RX ADMIN — HEPARIN SODIUM 5000 UNIT(S): 5000 INJECTION INTRAVENOUS; SUBCUTANEOUS at 21:37

## 2021-10-21 RX ADMIN — AMLODIPINE BESYLATE 10 MILLIGRAM(S): 2.5 TABLET ORAL at 05:48

## 2021-10-21 RX ADMIN — Medication 50 MILLIGRAM(S): at 21:48

## 2021-10-21 RX ADMIN — Medication 15 MILLIGRAM(S): at 03:00

## 2021-10-21 RX ADMIN — SENNA PLUS 1 TABLET(S): 8.6 TABLET ORAL at 18:00

## 2021-10-21 RX ADMIN — SENNA PLUS 1 TABLET(S): 8.6 TABLET ORAL at 05:48

## 2021-10-21 RX ADMIN — ATORVASTATIN CALCIUM 40 MILLIGRAM(S): 80 TABLET, FILM COATED ORAL at 21:37

## 2021-10-21 RX ADMIN — HEPARIN SODIUM 5000 UNIT(S): 5000 INJECTION INTRAVENOUS; SUBCUTANEOUS at 05:48

## 2021-10-21 RX ADMIN — Medication 15 MILLIGRAM(S): at 02:40

## 2021-10-21 NOTE — PROGRESS NOTE ADULT - ASSESSMENT
71 year old male with a past medical history of GERD, HTN, HLD, H/O FB INGESTION S/P EGD -REMOVAL OF FB BY DR CUEVAS 7/20/20 [facility], admitted from prior fall down 6 concrete steps in from of his house. Suspicion of ileus.    Plan:   continue NG tube NPO  will discuss repeat CT results with attending and make further recommentations   may clamp NG tube for ambulation, encourage OOB 71 year old male with a past medical history of GERD, HTN, HLD, H/O FB INGESTION S/P EGD -REMOVAL OF FB BY DR CUEVAS 7/20/20 [facility], admitted from prior fall down 6 concrete steps in from of his house. Suspicion of ileus.    Plan:   continue NG tube NPO  will discuss repeat CT results with attending and make further recommentations   may clamp NG tube for ambulation, encourage OOB    ADDENDUM:    CT Abdomen and Pelvis w/ Oral Cont and w/ IV Cont (10.21.21 @ 02:25) >  IMPRESSION:    Since 10/16/2021,    Interval placement of an enteric tube terminating in the gastric fundus.    Diffuse small and large bowel dilatation with air-fluid levels and liquid stool in the colon. No discrete transition point appreciated. No bowel wall thickening, free fluid or free air.    -- Will discuss above w/attending

## 2021-10-21 NOTE — PROGRESS NOTE ADULT - SUBJECTIVE AND OBJECTIVE BOX
Patient is a 71y old  Male who presents with a chief complaint of fall     INTERVAL HPI/OVERNIGHT EVENTS:     No n/v  Abdomen is distended  No pain   Walking with PT                 PHYSICAL EXAM:   NAD; Normocephalic;   LUNGS - no wheezing  HEART: S1 S2+   ABDOMEN: Soft, Nontender, + distention BS nl   EXTREMITIES:  2+ Peripheral Pulses, No clubbing, cyanosis, or edema, ROM of left hip limited due to pain, tender to palpation, R ankle swelling is improving  R shoulder tenderness cannot extend more than 90     NERVOUS SYSTEM:  Awake and alert; no focal neuro deficits appreciated                                                    11.2   5.00  )-----------( 393      ( 20 Oct 2021 08:59 )             32.8       10-20    130<L>  |  97<L>  |  24<H>  ----------------------------<  108<H>  3.5   |  18  |  1.0    Ca    9.5      20 Oct 2021 08:59    TPro  7.0  /  Alb  3.8  /  TBili  0.6  /  DBili  x   /  AST  20  /  ALT  17  /  AlkPhos  82  10-20                      Lactate Trend            CAPILLARY BLOOD GLUCOSE                              Lactate Trend            CAPILLARY BLOOD GLUCOSE          MEDICATIONS  (STANDING):  amLODIPine   Tablet 10 milliGRAM(s) Oral daily  atorvastatin 40 milliGRAM(s) Oral at bedtime  dextrose 5% + sodium chloride 0.45%. 1000 milliLiter(s) (125 mL/Hr) IV Continuous <Continuous>  heparin   Injectable 5000 Unit(s) SubCutaneous every 8 hours  influenza   Vaccine 0.5 milliLiter(s) IntraMuscular once  pantoprazole  Injectable 40 milliGRAM(s) IV Push every 12 hours  senna 1 Tablet(s) Oral two times a day    MEDICATIONS  (PRN):  acetaminophen   Tablet .. 650 milliGRAM(s) Oral every 6 hours PRN Mild Pain (1 - 3)  ondansetron Injectable 4 milliGRAM(s) IV Push every 8 hours PRN Nausea and/or Vomiting  oxycodone    5 mG/acetaminophen 325 mG 1 Tablet(s) Oral every 4 hours PRN Moderate Pain (4 - 6)  simethicone 80 milliGRAM(s) Chew every 8 hours PRN Gas                    Patient is a 71y old  Male who presents with a chief complaint of fall     INTERVAL HPI/OVERNIGHT EVENTS:   He walked with PT, says passed gas while walking   No n/v  Abdomen is still distended  No pain                   PHYSICAL EXAM:   NAD; Normocephalic;   LUNGS - no wheezing  HEART: S1 S2+   ABDOMEN: Soft, Nontender, + distention,  BS hypoactive    EXTREMITIES:  2+ Peripheral Pulses, No clubbing, cyanosis, or edema, ROM of left hip limited due to pain, tender to palpation, R ankle swelling is improving  R shoulder tenderness cannot extend more than 90     NERVOUS SYSTEM:  Awake and alert; no focal neuro deficits appreciated                                                               11.0   4.87  )-----------( 383      ( 21 Oct 2021 07:35 )             32.8       10-21    135  |  100  |  21<H>  ----------------------------<  115<H>  3.5   |  22  |  1.1    Ca    9.1      21 Oct 2021 07:35    TPro  6.5  /  Alb  3.8  /  TBili  0.6  /  DBili  x   /  AST  29  /  ALT  29  /  AlkPhos  78  10-21                      Lactate Trend            CAPILLARY BLOOD GLUCOSE                                                  Lactate Trend            CAPILLARY BLOOD GLUCOSE          MEDICATIONS  (STANDING):  amLODIPine   Tablet 10 milliGRAM(s) Oral daily  atorvastatin 40 milliGRAM(s) Oral at bedtime  dextrose 5% + sodium chloride 0.45%. 1000 milliLiter(s) (125 mL/Hr) IV Continuous <Continuous>  heparin   Injectable 5000 Unit(s) SubCutaneous every 8 hours  influenza   Vaccine 0.5 milliLiter(s) IntraMuscular once  pantoprazole  Injectable 40 milliGRAM(s) IV Push every 12 hours  senna 1 Tablet(s) Oral two times a day    MEDICATIONS  (PRN):  acetaminophen   Tablet .. 650 milliGRAM(s) Oral every 6 hours PRN Mild Pain (1 - 3)  ondansetron Injectable 4 milliGRAM(s) IV Push every 8 hours PRN Nausea and/or Vomiting  oxycodone    5 mG/acetaminophen 325 mG 1 Tablet(s) Oral every 4 hours PRN Moderate Pain (4 - 6)  simethicone 80 milliGRAM(s) Chew every 8 hours PRN Gas

## 2021-10-21 NOTE — PROGRESS NOTE ADULT - SUBJECTIVE AND OBJECTIVE BOX
71 year old male with a past medical history of GERD, HTN, HLD, H/O FB INGESTION S/P EGD -REMOVAL OF FB BY DR CUEVAS 7/20/20 [facility], admitted from prior fall down 6 concrete steps in from of his house. This morning patient has no pain but states his abdomen is still very bloated and distended. Patient states his distention has not increased or decreased since yesterday. Patient states his last bowel movement was this morning and says it was watery. Patient states he only passes gas when he goes to take a bowel movement, not laying in the bed or sitting up on the chair. NG tube is still in good placement, putting out 300cc at 8:30 this morning. Patient denies any nausea or vomiting, fever/chills, fatigue/weakness or palpitations.     MEDICATIONS  (STANDING):  amLODIPine   Tablet 10 milliGRAM(s) Oral daily  atorvastatin 40 milliGRAM(s) Oral at bedtime  dextrose 5% + sodium chloride 0.9%. 1000 milliLiter(s) (75 mL/Hr) IV Continuous <Continuous>  heparin   Injectable 5000 Unit(s) SubCutaneous every 8 hours  influenza   Vaccine 0.5 milliLiter(s) IntraMuscular once  lidocaine   4% Patch 1 Patch Transdermal daily  pantoprazole  Injectable 40 milliGRAM(s) IV Push every 12 hours  senna 1 Tablet(s) Oral two times a day    MEDICATIONS  (PRN):  acetaminophen   Tablet .. 650 milliGRAM(s) Oral every 6 hours PRN Mild Pain (1 - 3)  acetaminophen   Tablet .. 650 milliGRAM(s) Oral every 6 hours PRN Temp greater or equal to 38.5C (101.3F), Mild Pain (1 - 3), Moderate Pain (4 - 6), Severe Pain (7 - 10)  ondansetron Injectable 4 milliGRAM(s) IV Push every 8 hours PRN Nausea and/or Vomiting  simethicone 80 milliGRAM(s) Chew every 8 hours PRN Gas    Physical Exam:    Constitutional: Patient is sitting upright in chair and is comfortable  Respiratory: Equal breath sounds B/L, no rales, rhonchi or wheezes  Abdomen: Diminished bowel sounds in all quadrants, Tympanic to percussion, distention noted, non-tender  Cardio: Normal S1 and S2, no murmurs, rubs or gallops    Vital Signs Last 24 Hrs  T(C): 37.2 (21 Oct 2021 05:00), Max: 37.2 (21 Oct 2021 05:00)  T(F): 98.9 (21 Oct 2021 05:00), Max: 98.9 (21 Oct 2021 05:00)  HR: 72 (21 Oct 2021 05:00) (72 - 83)  BP: 132/65 (21 Oct 2021 05:00) (132/65 - 138/83)  BP(mean): --  RR: 16 (21 Oct 2021 05:00) (16 - 16)  SpO2: --    Labs:  Comprehensive Metabolic Panel in AM (10.21.21 @ 07:35)    Sodium, Serum: 135 mmol/L    Potassium, Serum: 3.5 mmol/L    Chloride, Serum: 100 mmol/L    Carbon Dioxide, Serum: 22 mmol/L    Anion Gap, Serum: 13 mmol/L    Blood Urea Nitrogen, Serum: 21 mg/dL    Creatinine, Serum: 1.1 mg/dL    Glucose, Serum: 115 mg/dL    Calcium, Total Serum: 9.1 mg/dL    Protein Total, Serum: 6.5 g/dL    Albumin, Serum: 3.8 g/dL    Bilirubin Total, Serum: 0.6 mg/dL    Alkaline Phosphatase, Serum: 78 U/L    Aspartate Aminotransferase (AST/SGOT): 29 U/L    Alanine Aminotransferase (ALT/SGPT): 29 U/L    eGFR if : 79 mL/min/1.73M2    Imaging:   CT of ABD and PELVIS WITH ORAL CONTRAST WITH IV CONTRAST- 10/21/2021 2:16  Impression: Interval placement of an enteric tube terminating in the gastric fundus.   Diffuse small and large bowel dilation with air fluid levels and liquid stool in the colon. No discrete transition point appreciated. No bowel wall thickening, free fluid or free air.            71 year old male with a past medical history of GERD, HTN, HLD, H/O FB INGESTION S/P EGD -REMOVAL OF FB BY DR CUEVAS 7/20/20 [facility], admitted from prior fall down 6 concrete steps in from of his house. This morning patient has no pain but states his abdomen is still very bloated and distended. Patient states his distention has not increased or decreased since yesterday. Patient states his last bowel movement was this morning and says it was watery. Patient states he only passes gas when he goes to take a bowel movement, not laying in the bed or sitting up on the chair. NG tube is still in good placement, putting out 300cc at 8:30 this morning. Patient denies any nausea or vomiting, fever/chills, fatigue/weakness or palpitations.     MEDICATIONS  (STANDING):  amLODIPine   Tablet 10 milliGRAM(s) Oral daily  atorvastatin 40 milliGRAM(s) Oral at bedtime  dextrose 5% + sodium chloride 0.9%. 1000 milliLiter(s) (75 mL/Hr) IV Continuous <Continuous>  heparin   Injectable 5000 Unit(s) SubCutaneous every 8 hours  influenza   Vaccine 0.5 milliLiter(s) IntraMuscular once  lidocaine   4% Patch 1 Patch Transdermal daily  pantoprazole  Injectable 40 milliGRAM(s) IV Push every 12 hours  senna 1 Tablet(s) Oral two times a day    MEDICATIONS  (PRN):  acetaminophen   Tablet .. 650 milliGRAM(s) Oral every 6 hours PRN Mild Pain (1 - 3)  acetaminophen   Tablet .. 650 milliGRAM(s) Oral every 6 hours PRN Temp greater or equal to 38.5C (101.3F), Mild Pain (1 - 3), Moderate Pain (4 - 6), Severe Pain (7 - 10)  ondansetron Injectable 4 milliGRAM(s) IV Push every 8 hours PRN Nausea and/or Vomiting  simethicone 80 milliGRAM(s) Chew every 8 hours PRN Gas    Physical Exam:    Constitutional: Patient is sitting upright in chair and is comfortable  Respiratory: Equal breath sounds B/L, no rales, rhonchi or wheezes  Abdomen: Diminished bowel sounds in all quadrants, Tympanic to percussion, distention noted, non-tender. NGT in place  Cardio: Normal S1 and S2, no murmurs, rubs or gallops    Vital Signs Last 24 Hrs  T(C): 37.2 (21 Oct 2021 05:00), Max: 37.2 (21 Oct 2021 05:00)  T(F): 98.9 (21 Oct 2021 05:00), Max: 98.9 (21 Oct 2021 05:00)  HR: 72 (21 Oct 2021 05:00) (72 - 83)  BP: 132/65 (21 Oct 2021 05:00) (132/65 - 138/83)  BP(mean): --  RR: 16 (21 Oct 2021 05:00) (16 - 16)  SpO2: --    Labs:  CAPILLARY BLOOD GLUCOSE                              11.0   4.87  )-----------( 383      ( 21 Oct 2021 07:35 )             32.8         10-21    135  |  100  |  21<H>  ----------------------------<  115<H>  3.5   |  22  |  1.1      Calcium, Total Serum: 9.1 mg/dL (10-21-21 @ 07:35)      LFTs:             6.5  | 0.6  | 29       ------------------[78      ( 21 Oct 2021 07:35 )  3.8  | x    | 29          Lipase:x      Amylase:x

## 2021-10-21 NOTE — PROGRESS NOTE ADULT - ASSESSMENT
70 y/o M PMHx HTN and HLD BIBA present to the Ed s/p fall. Found to have nondisplaced left hip fracture, not needing surgery, course complicated by narcotic induced Ileus             # Nasuea/ vomiting likely 2' ileus due to narcotics Vs mechanical SBO?   NG placement today   Avoid narcotics        #Mechanical fall with left hip minimally displaced greater trochanter fx, no extension into intertrochanteric region  CT Left hip Nondisplaced fracture of the left greater trochanter.  Rest of trauma workup has been negative  - Pain control  Ortho c appreciated  No sx indicated per ortho     #Right ankle sprain s/p fall      R radial N paresis 2' fall  XR neg for shoulder dislocation     #HTN/HLD  - Cont amlodipine 10mg qD  - Cont atorvastatin 80mg qD    #WILL on CKD Stage 2' hypovolemia due to vomiting, improved    IVF      fever, resolved    CXR- neg   IS     DVT PPX, heparin SC        72 y/o M PMHx HTN and HLD BIBA present to the Ed s/p fall. Found to have nondisplaced left hip fracture, not needing surgery, course complicated by narcotic induced Ileus Vs SBO              # Nasuea/ vomiting/ abdominal distention  likely 2' ileus due to narcotics Vs SBO?   NG placement 10/20, however needs to be advanced. I spoke to SX PA to advance it   Avoid narcotics   CT abdomen still with air fluid levels   Continue supportive care        #Mechanical fall with left hip minimally displaced greater trochanter fx, no extension into intertrochanteric region, no surgery indicated   CT Left hip Nondisplaced fracture of the left greater trochanter.  Rest of trauma workup has been negative  - Pain control  Ortho c appreciated  No sx indicated per ortho     #Right ankle sprain s/p fall, improved       R radial N paresis 2' fall, improved   XR neg for shoulder dislocation     #HTN/HLD   amlodipine 10mg qD   atorvastatin 80mg qD    #WILL on CKD Stage 2' hypovolemia due to vomiting, improved          fever, resolved    CXR- neg   IS     DVT PPX, heparin SC      Handoff  Pt's NG needs to be advanced, spoke to SX PA  After ileus is resolved, needs rehab for non displaced hip fracture.  Pt is walking with walker

## 2021-10-22 LAB
ALBUMIN SERPL ELPH-MCNC: 3.7 G/DL — SIGNIFICANT CHANGE UP (ref 3.5–5.2)
ALP SERPL-CCNC: 79 U/L — SIGNIFICANT CHANGE UP (ref 30–115)
ALT FLD-CCNC: 35 U/L — SIGNIFICANT CHANGE UP (ref 0–41)
ANION GAP SERPL CALC-SCNC: 13 MMOL/L — SIGNIFICANT CHANGE UP (ref 7–14)
AST SERPL-CCNC: 34 U/L — SIGNIFICANT CHANGE UP (ref 0–41)
BILIRUB SERPL-MCNC: 0.5 MG/DL — SIGNIFICANT CHANGE UP (ref 0.2–1.2)
BUN SERPL-MCNC: 15 MG/DL — SIGNIFICANT CHANGE UP (ref 10–20)
CALCIUM SERPL-MCNC: 8.9 MG/DL — SIGNIFICANT CHANGE UP (ref 8.5–10.1)
CHLORIDE SERPL-SCNC: 108 MMOL/L — SIGNIFICANT CHANGE UP (ref 98–110)
CO2 SERPL-SCNC: 19 MMOL/L — SIGNIFICANT CHANGE UP (ref 17–32)
CREAT SERPL-MCNC: 0.9 MG/DL — SIGNIFICANT CHANGE UP (ref 0.7–1.5)
GLUCOSE SERPL-MCNC: 111 MG/DL — HIGH (ref 70–99)
HCT VFR BLD CALC: 33.5 % — LOW (ref 42–52)
HGB BLD-MCNC: 11.1 G/DL — LOW (ref 14–18)
MCHC RBC-ENTMCNC: 29.2 PG — SIGNIFICANT CHANGE UP (ref 27–31)
MCHC RBC-ENTMCNC: 33.1 G/DL — SIGNIFICANT CHANGE UP (ref 32–37)
MCV RBC AUTO: 88.2 FL — SIGNIFICANT CHANGE UP (ref 80–94)
NRBC # BLD: 0 /100 WBCS — SIGNIFICANT CHANGE UP (ref 0–0)
PLATELET # BLD AUTO: 409 K/UL — HIGH (ref 130–400)
POTASSIUM SERPL-MCNC: 3.3 MMOL/L — LOW (ref 3.5–5)
POTASSIUM SERPL-SCNC: 3.3 MMOL/L — LOW (ref 3.5–5)
PROT SERPL-MCNC: 6 G/DL — SIGNIFICANT CHANGE UP (ref 6–8)
RBC # BLD: 3.8 M/UL — LOW (ref 4.7–6.1)
RBC # FLD: 12.8 % — SIGNIFICANT CHANGE UP (ref 11.5–14.5)
SODIUM SERPL-SCNC: 140 MMOL/L — SIGNIFICANT CHANGE UP (ref 135–146)
WBC # BLD: 7.13 K/UL — SIGNIFICANT CHANGE UP (ref 4.8–10.8)
WBC # FLD AUTO: 7.13 K/UL — SIGNIFICANT CHANGE UP (ref 4.8–10.8)

## 2021-10-22 PROCEDURE — 74019 RADEX ABDOMEN 2 VIEWS: CPT | Mod: 26

## 2021-10-22 PROCEDURE — 99232 SBSQ HOSP IP/OBS MODERATE 35: CPT

## 2021-10-22 RX ORDER — POTASSIUM CHLORIDE 20 MEQ
20 PACKET (EA) ORAL ONCE
Refills: 0 | Status: COMPLETED | OUTPATIENT
Start: 2021-10-22 | End: 2021-10-22

## 2021-10-22 RX ORDER — HYDROXYZINE HCL 10 MG
50 TABLET ORAL ONCE
Refills: 0 | Status: COMPLETED | OUTPATIENT
Start: 2021-10-22 | End: 2021-10-22

## 2021-10-22 RX ADMIN — ATORVASTATIN CALCIUM 40 MILLIGRAM(S): 80 TABLET, FILM COATED ORAL at 22:00

## 2021-10-22 RX ADMIN — AMLODIPINE BESYLATE 10 MILLIGRAM(S): 2.5 TABLET ORAL at 06:01

## 2021-10-22 RX ADMIN — SENNA PLUS 1 TABLET(S): 8.6 TABLET ORAL at 17:27

## 2021-10-22 RX ADMIN — PANTOPRAZOLE SODIUM 40 MILLIGRAM(S): 20 TABLET, DELAYED RELEASE ORAL at 06:00

## 2021-10-22 RX ADMIN — LIDOCAINE 1 PATCH: 4 CREAM TOPICAL at 12:20

## 2021-10-22 RX ADMIN — Medication 50 MILLIGRAM(S): at 22:01

## 2021-10-22 RX ADMIN — PANTOPRAZOLE SODIUM 40 MILLIGRAM(S): 20 TABLET, DELAYED RELEASE ORAL at 17:27

## 2021-10-22 RX ADMIN — LIDOCAINE 1 PATCH: 4 CREAM TOPICAL at 19:15

## 2021-10-22 RX ADMIN — SENNA PLUS 1 TABLET(S): 8.6 TABLET ORAL at 06:01

## 2021-10-22 RX ADMIN — Medication 50 MILLIEQUIVALENT(S): at 12:50

## 2021-10-22 RX ADMIN — OXYCODONE AND ACETAMINOPHEN 1 TABLET(S): 5; 325 TABLET ORAL at 15:57

## 2021-10-22 RX ADMIN — HEPARIN SODIUM 5000 UNIT(S): 5000 INJECTION INTRAVENOUS; SUBCUTANEOUS at 22:00

## 2021-10-22 RX ADMIN — HEPARIN SODIUM 5000 UNIT(S): 5000 INJECTION INTRAVENOUS; SUBCUTANEOUS at 06:01

## 2021-10-22 RX ADMIN — HEPARIN SODIUM 5000 UNIT(S): 5000 INJECTION INTRAVENOUS; SUBCUTANEOUS at 15:50

## 2021-10-22 NOTE — PROGRESS NOTE ADULT - ASSESSMENT
71 year old male with a past medical history of GERD, HTN, HLD, H/O FB INGESTION S/P EGD -REMOVAL OF FB admitted to surgery for SBO. Patient is doing well, with no new complaints. Ng tube still in place.    Plan:  - continue NG tube NPO  - clamp NG tube for ambulation and advise patient to walk around  -  71 year old male with a past medical history of GERD, HTN, HLD, H/O FB INGESTION S/P EGD -REMOVAL OF FB admitted to surgery for SBO. Patient is doing well, with no new complaints. Ng tube still in place.    Plan:  - continue NG tube   - NPO  - clamp NG tube for ambulation and advise patient to walk around  - AXR today

## 2021-10-22 NOTE — DIETITIAN INITIAL EVALUATION ADULT. - WEIGHT FOR BMI (KG)
106
You can access the FollowMyHealth Patient Portal offered by [facility] by registering at the following website: [email or web address]. By joining [facility]’s FollowMyHealth portal, you will also be able to view your health information using other applications (apps) compatible with our system.

## 2021-10-22 NOTE — PROGRESS NOTE ADULT - ASSESSMENT
70 y/o M PMHx HTN and HLD BIBA present to the Ed s/p fall. Found to have nondisplaced left hip fracture, not needing surgery, course complicated by narcotic induced Ileus Vs SBO    # Nasuea/ vomiting/ abdominal distention 2/2 ileus due to narcotics Vs SBO?  - CT abdomen still with air fluid levels   PLAN   - Surg on board  - cont NGT for now; clamp NG tube for ambulation and advise patient to walk around  - Avoid narcotics - Continue supportive care   - NPO  - IVF  - monitor electrolytes  - f/u AXR today      # Mechanical fall with left hip minimally displaced greater trochanter fx, no extension into intertrochanteric region  # Right ankle sprain s/p fall, improved   # R radial nerve paresis 2/2 fall, improved   - CT Left hip Nondisplaced fracture of the left greater trochanter.  - Rest of trauma workup has been negative; XR neg for shoulder dislocation   PLAN  - Pain control  - Ortho eval appreciated; No sx indicated per ortho     #HTN/HLD  - amlodipine 10mg qD  - atorvastatin 80mg qD    #WILL on CKD Stage 2/2 hypovolemia due to vomiting, improved    - cont IVF  - monitor BMP    # fever, resolved likely 2/2 atelectasis   - CXR- neg   - IS     DVT PPX, heparin SC    Dispo: remains acute for now; surg f/u; After ileus is resolved, needs rehab for non displaced hip fracture.

## 2021-10-22 NOTE — PROGRESS NOTE ADULT - SUBJECTIVE AND OBJECTIVE BOX
71 year old male with a past medical history of GERD, HTN, HLD, H/O FB INGESTION S/P EGD -REMOVAL OF FB admitted to surgery for SBO. Patient is doing well, with no new complaints. Patient states he has been passing a lot of gas and had 1 bowel movement this morning that was watery. Patient states he feels his abdomen is going down in size and feels softer than it did yesterday. Patient denies any nausea, vomiting, fever/chills, sob, chest pain or palpitations.     MEDICATIONS  (STANDING):  amLODIPine   Tablet 10 milliGRAM(s) Oral daily  atorvastatin 40 milliGRAM(s) Oral at bedtime  dextrose 5% + sodium chloride 0.9%. 1000 milliLiter(s) (75 mL/Hr) IV Continuous <Continuous>  heparin   Injectable 5000 Unit(s) SubCutaneous every 8 hours  influenza   Vaccine 0.5 milliLiter(s) IntraMuscular once  lidocaine   4% Patch 1 Patch Transdermal daily  pantoprazole  Injectable 40 milliGRAM(s) IV Push every 12 hours  senna 1 Tablet(s) Oral two times a day    MEDICATIONS  (PRN):  acetaminophen   Tablet .. 650 milliGRAM(s) Oral every 6 hours PRN Mild Pain (1 - 3)  acetaminophen   Tablet .. 650 milliGRAM(s) Oral every 6 hours PRN Temp greater or equal to 38.5C (101.3F), Mild Pain (1 - 3), Moderate Pain (4 - 6), Severe Pain (7 - 10)  ondansetron Injectable 4 milliGRAM(s) IV Push every 8 hours PRN Nausea and/or Vomiting  oxycodone    5 mG/acetaminophen 325 mG 1 Tablet(s) Oral every 4 hours PRN Moderate Pain (4 - 6)  simethicone 80 milliGRAM(s) Chew every 8 hours PRN Gas    Physical Exam:  Constitutional: Patient is laying in bed and is comfortable in no apparent distress  Respiratory: Equal breath sounds B/L, no rales, rhonchi or wheezes  Cardio: Normal S1 and S2, no murmurs, rubs or gallops  Abdomen: Firm, non-tender, tympanic on percussion    Vital Signs Last 24 Hrs  T(C): 36.6 (22 Oct 2021 05:21), Max: 36.7 (21 Oct 2021 13:43)  T(F): 97.8 (22 Oct 2021 05:21), Max: 98.1 (21 Oct 2021 13:43)  HR: 76 (22 Oct 2021 05:21) (76 - 86)  BP: 131/70 (22 Oct 2021 05:21) (129/64 - 146/84)  BP(mean): --  RR: 16 (22 Oct 2021 05:21) (16 - 16)  SpO2: --    Complete Blood Count in AM (10.22.21 @ 08:11)    Nucleated RBC: 0 /100 WBCs    WBC Count: 7.13 K/uL    RBC Count: 3.80 M/uL    Hemoglobin: 11.1 g/dL    Hematocrit: 33.5 %    Mean Cell Volume: 88.2 fL    Mean Cell Hemoglobin: 29.2 pg    Mean Cell Hemoglobin Conc: 33.1 g/dL    Red Cell Distrib Width: 12.8 %    Platelet Count - Automated: 409 K/uL       71 year old male with a past medical history of GERD, HTN, HLD, H/O FB INGESTION S/P EGD -REMOVAL OF FB admitted for SBO. Patient is doing well, with no new complaints. Patient states he has been passing a lot of gas and had 1 bowel movement this morning that was watery. Patient states he feels his abdomen is going down in size and feels softer than it did yesterday. Patient denies any nausea, vomiting, fever/chills, sob, chest pain or palpitations.     MEDICATIONS  (STANDING):  amLODIPine   Tablet 10 milliGRAM(s) Oral daily  atorvastatin 40 milliGRAM(s) Oral at bedtime  dextrose 5% + sodium chloride 0.9%. 1000 milliLiter(s) (75 mL/Hr) IV Continuous <Continuous>  heparin   Injectable 5000 Unit(s) SubCutaneous every 8 hours  influenza   Vaccine 0.5 milliLiter(s) IntraMuscular once  lidocaine   4% Patch 1 Patch Transdermal daily  pantoprazole  Injectable 40 milliGRAM(s) IV Push every 12 hours  senna 1 Tablet(s) Oral two times a day    MEDICATIONS  (PRN):  acetaminophen   Tablet .. 650 milliGRAM(s) Oral every 6 hours PRN Mild Pain (1 - 3)  acetaminophen   Tablet .. 650 milliGRAM(s) Oral every 6 hours PRN Temp greater or equal to 38.5C (101.3F), Mild Pain (1 - 3), Moderate Pain (4 - 6), Severe Pain (7 - 10)  ondansetron Injectable 4 milliGRAM(s) IV Push every 8 hours PRN Nausea and/or Vomiting  oxycodone    5 mG/acetaminophen 325 mG 1 Tablet(s) Oral every 4 hours PRN Moderate Pain (4 - 6)  simethicone 80 milliGRAM(s) Chew every 8 hours PRN Gas    I&O's Summary    21 Oct 2021 07:01  -  22 Oct 2021 07:00  --------------------------------------------------------  IN: 0 mL / OUT: 200 mL / NET: -200 mL        Vital Signs Last 24 Hrs  T(C): 36.6 (22 Oct 2021 05:21), Max: 36.7 (21 Oct 2021 13:43)  T(F): 97.8 (22 Oct 2021 05:21), Max: 98.1 (21 Oct 2021 13:43)  HR: 76 (22 Oct 2021 05:21) (76 - 86)  BP: 131/70 (22 Oct 2021 05:21) (129/64 - 146/84)  RR: 16 (22 Oct 2021 05:21) (16 - 16)      Physical Exam:  Constitutional: Patient is laying in bed and is comfortable in no apparent distress  Respiratory: Equal breath sounds B/L, no rales, rhonchi or wheezes  Cardio: Normal S1 and S2, no murmurs, rubs or gallops  Abdomen: soft , non-tender, tympanic on percussion      10-22    140  |  108  |  15  ----------------------------<  111<H>  3.3<L>   |  19  |  0.9    Ca    8.9      22 Oct 2021 08:11    TPro  6.0  /  Alb  3.7  /  TBili  0.5  /  DBili  x   /  AST  34  /  ALT  35  /  AlkPhos  79  10-22                            11.1   7.13  )-----------( 409      ( 22 Oct 2021 08:11 )             33.5     CAPILLARY BLOOD GLUCOSE

## 2021-10-22 NOTE — DIETITIAN INITIAL EVALUATION ADULT. - REASON INDICATOR FOR ASSESSMENT
----- Message from Jeferson Whitmore NP sent at 10/6/2020  4:23 PM CDT -----  Her A1c is to high right now for her to be cleared. We will need to work on getting her diabetes better controlled before she can have an elective surgery  ----- Message -----  From: Melanie Coto LPN  Sent: 10/6/2020   4:00 PM CDT  To: Jeferson Whitmore NP      
Notified Melanie Coto LPN with [facility] that pt's A1C is high right know and will not be able to be cleared for elective surgery at this time. Attempted to notify pt, unable to leave a voicemail.   
Rd screen 2/2 prolonged NPO status

## 2021-10-22 NOTE — DIETITIAN INITIAL EVALUATION ADULT. - OTHER INFO
pt is 71 year old male with hx of HTN, gerd, HLD presents with pain s/p fall down 6 concrete steps. + L hip minimally displaced FX, R ankle sprain, R shoulder pain, no surgical intervention warranted. 10/16 c/o abd pain with bloating. KUB--> + SBO with transition point in mid abdomen, likely narcotic induced. s/p NGT to suction. no acute GI intervention. pt has been having small watery BM and passing gas however abd remains distended likely ileus. Repeat KUB today shows multiple gas filled dilated small bowel and colonic loops. Pt remains NPO, NGT to suction with Bilious return >400ml. NST consult placed.

## 2021-10-22 NOTE — DIETITIAN INITIAL EVALUATION ADULT. - PERTINENT MEDS FT
MEDICATIONS  (STANDING):  amLODIPine   Tablet 10 milliGRAM(s) Oral daily  atorvastatin 40 milliGRAM(s) Oral at bedtime  dextrose 5% + sodium chloride 0.9%. 1000 milliLiter(s) (75 mL/Hr) IV Continuous <Continuous>  heparin   Injectable 5000 Unit(s) SubCutaneous every 8 hours  influenza   Vaccine 0.5 milliLiter(s) IntraMuscular once  lidocaine   4% Patch 1 Patch Transdermal daily  pantoprazole  Injectable 40 milliGRAM(s) IV Push every 12 hours  senna 1 Tablet(s) Oral two times a day    MEDICATIONS  (PRN):  acetaminophen   Tablet .. 650 milliGRAM(s) Oral every 6 hours PRN Mild Pain (1 - 3)  acetaminophen   Tablet .. 650 milliGRAM(s) Oral every 6 hours PRN Temp greater or equal to 38.5C (101.3F), Mild Pain (1 - 3), Moderate Pain (4 - 6), Severe Pain (7 - 10)  ondansetron Injectable 4 milliGRAM(s) IV Push every 8 hours PRN Nausea and/or Vomiting  oxycodone    5 mG/acetaminophen 325 mG 1 Tablet(s) Oral every 4 hours PRN Moderate Pain (4 - 6)  simethicone 80 milliGRAM(s) Chew every 8 hours PRN Gas

## 2021-10-22 NOTE — PROGRESS NOTE ADULT - SUBJECTIVE AND OBJECTIVE BOX
Patient is a 71y old  Male who presents with a chief complaint of fall     INTERVAL HPI/OVERNIGHT EVENTS:   Patient seen and examined at bedside; patient denies any abd pain, N/V; does report that he had a BM this AM and reports he passed gas this AM. Otherwise reports he would like to walk with PT. He does report fo Left hip pain.    ROS: All systems review and otherwise negative        PHYSICAL EXAM:  Gen: NAD, appears comfortable  HEENT: AT, NC, EOMI, MMM  LUNGS - no wheezing, no rales, no ronchi  HEART: S1 S2+, normal S1s2, no LE edema  ABDOMEN: Soft, Nontender, + distention,  BS hypoactive, +NG tube  EXTREMITIES:  2+ Peripheral Pulses, No clubbing, cyanosis, or edema, ROM of left hip limited due to pain, tender to palpation, +R ankle swelling (improving), +R shoulder tenderness cannot extend more than 90   NERVOUS SYSTEM:  Awake and alert; no focal neuro deficits appreciated                          11.1   7.13  )-----------( 409      ( 22 Oct 2021 08:11 )             33.5   10-22    140  |  108  |  15  ----------------------------<  111<H>  3.3<L>   |  19  |  0.9    Ca    8.9      22 Oct 2021 08:11    TPro  6.0  /  Alb  3.7  /  TBili  0.5  /  DBili  x   /  AST  34  /  ALT  35  /  AlkPhos  79  10-22      Lactate Trend    CAPILLARY BLOOD GLUCOSE      MEDICATIONS  (STANDING):  amLODIPine   Tablet 10 milliGRAM(s) Oral daily  atorvastatin 40 milliGRAM(s) Oral at bedtime  dextrose 5% + sodium chloride 0.9%. 1000 milliLiter(s) (75 mL/Hr) IV Continuous <Continuous>  heparin   Injectable 5000 Unit(s) SubCutaneous every 8 hours  influenza   Vaccine 0.5 milliLiter(s) IntraMuscular once  lidocaine   4% Patch 1 Patch Transdermal daily  pantoprazole  Injectable 40 milliGRAM(s) IV Push every 12 hours  senna 1 Tablet(s) Oral two times a day    MEDICATIONS  (PRN):  acetaminophen   Tablet .. 650 milliGRAM(s) Oral every 6 hours PRN Mild Pain (1 - 3)  acetaminophen   Tablet .. 650 milliGRAM(s) Oral every 6 hours PRN Temp greater or equal to 38.5C (101.3F), Mild Pain (1 - 3), Moderate Pain (4 - 6), Severe Pain (7 - 10)  ondansetron Injectable 4 milliGRAM(s) IV Push every 8 hours PRN Nausea and/or Vomiting  oxycodone    5 mG/acetaminophen 325 mG 1 Tablet(s) Oral every 4 hours PRN Moderate Pain (4 - 6)  simethicone 80 milliGRAM(s) Chew every 8 hours PRN Gas

## 2021-10-23 LAB
ALBUMIN SERPL ELPH-MCNC: 3.6 G/DL — SIGNIFICANT CHANGE UP (ref 3.5–5.2)
ALP SERPL-CCNC: 90 U/L — SIGNIFICANT CHANGE UP (ref 30–115)
ALT FLD-CCNC: 42 U/L — HIGH (ref 0–41)
ANION GAP SERPL CALC-SCNC: 13 MMOL/L — SIGNIFICANT CHANGE UP (ref 7–14)
AST SERPL-CCNC: 37 U/L — SIGNIFICANT CHANGE UP (ref 0–41)
BILIRUB SERPL-MCNC: 0.5 MG/DL — SIGNIFICANT CHANGE UP (ref 0.2–1.2)
BUN SERPL-MCNC: 10 MG/DL — SIGNIFICANT CHANGE UP (ref 10–20)
CALCIUM SERPL-MCNC: 9 MG/DL — SIGNIFICANT CHANGE UP (ref 8.5–10.1)
CHLORIDE SERPL-SCNC: 109 MMOL/L — SIGNIFICANT CHANGE UP (ref 98–110)
CO2 SERPL-SCNC: 20 MMOL/L — SIGNIFICANT CHANGE UP (ref 17–32)
CREAT SERPL-MCNC: 1 MG/DL — SIGNIFICANT CHANGE UP (ref 0.7–1.5)
GLUCOSE SERPL-MCNC: 111 MG/DL — HIGH (ref 70–99)
HCT VFR BLD CALC: 34.3 % — LOW (ref 42–52)
HGB BLD-MCNC: 11.2 G/DL — LOW (ref 14–18)
MAGNESIUM SERPL-MCNC: 2 MG/DL — SIGNIFICANT CHANGE UP (ref 1.8–2.4)
MCHC RBC-ENTMCNC: 28.7 PG — SIGNIFICANT CHANGE UP (ref 27–31)
MCHC RBC-ENTMCNC: 32.7 G/DL — SIGNIFICANT CHANGE UP (ref 32–37)
MCV RBC AUTO: 87.9 FL — SIGNIFICANT CHANGE UP (ref 80–94)
NRBC # BLD: 0 /100 WBCS — SIGNIFICANT CHANGE UP (ref 0–0)
PLATELET # BLD AUTO: 444 K/UL — HIGH (ref 130–400)
POTASSIUM SERPL-MCNC: 3.4 MMOL/L — LOW (ref 3.5–5)
POTASSIUM SERPL-SCNC: 3.4 MMOL/L — LOW (ref 3.5–5)
PROT SERPL-MCNC: 6.3 G/DL — SIGNIFICANT CHANGE UP (ref 6–8)
RBC # BLD: 3.9 M/UL — LOW (ref 4.7–6.1)
RBC # FLD: 12.7 % — SIGNIFICANT CHANGE UP (ref 11.5–14.5)
SODIUM SERPL-SCNC: 142 MMOL/L — SIGNIFICANT CHANGE UP (ref 135–146)
TRIGL SERPL-MCNC: 181 MG/DL — HIGH
WBC # BLD: 9.23 K/UL — SIGNIFICANT CHANGE UP (ref 4.8–10.8)
WBC # FLD AUTO: 9.23 K/UL — SIGNIFICANT CHANGE UP (ref 4.8–10.8)

## 2021-10-23 PROCEDURE — 99232 SBSQ HOSP IP/OBS MODERATE 35: CPT

## 2021-10-23 PROCEDURE — 74018 RADEX ABDOMEN 1 VIEW: CPT | Mod: 26

## 2021-10-23 RX ORDER — ELECTROLYTE SOLUTION,INJ
1 VIAL (ML) INTRAVENOUS
Refills: 0 | Status: DISCONTINUED | OUTPATIENT
Start: 2021-10-23 | End: 2021-10-23

## 2021-10-23 RX ORDER — POTASSIUM CHLORIDE 20 MEQ
20 PACKET (EA) ORAL ONCE
Refills: 0 | Status: COMPLETED | OUTPATIENT
Start: 2021-10-23 | End: 2021-10-23

## 2021-10-23 RX ORDER — I.V. FAT EMULSION 20 G/100ML
0.71 EMULSION INTRAVENOUS
Qty: 75.26 | Refills: 0 | Status: DISCONTINUED | OUTPATIENT
Start: 2021-10-23 | End: 2021-10-23

## 2021-10-23 RX ORDER — HYDROXYZINE HCL 10 MG
50 TABLET ORAL ONCE
Refills: 0 | Status: COMPLETED | OUTPATIENT
Start: 2021-10-23 | End: 2021-10-23

## 2021-10-23 RX ORDER — HYDROXYZINE HCL 10 MG
25 TABLET ORAL ONCE
Refills: 0 | Status: DISCONTINUED | OUTPATIENT
Start: 2021-10-23 | End: 2021-10-23

## 2021-10-23 RX ADMIN — PANTOPRAZOLE SODIUM 40 MILLIGRAM(S): 20 TABLET, DELAYED RELEASE ORAL at 18:56

## 2021-10-23 RX ADMIN — Medication 50 MILLIEQUIVALENT(S): at 12:44

## 2021-10-23 RX ADMIN — LIDOCAINE 1 PATCH: 4 CREAM TOPICAL at 12:44

## 2021-10-23 RX ADMIN — SENNA PLUS 1 TABLET(S): 8.6 TABLET ORAL at 06:00

## 2021-10-23 RX ADMIN — HEPARIN SODIUM 5000 UNIT(S): 5000 INJECTION INTRAVENOUS; SUBCUTANEOUS at 06:00

## 2021-10-23 RX ADMIN — Medication 1 EACH: at 20:04

## 2021-10-23 RX ADMIN — ATORVASTATIN CALCIUM 40 MILLIGRAM(S): 80 TABLET, FILM COATED ORAL at 22:01

## 2021-10-23 RX ADMIN — Medication 50 MILLIGRAM(S): at 22:18

## 2021-10-23 RX ADMIN — AMLODIPINE BESYLATE 10 MILLIGRAM(S): 2.5 TABLET ORAL at 06:00

## 2021-10-23 RX ADMIN — OXYCODONE AND ACETAMINOPHEN 1 TABLET(S): 5; 325 TABLET ORAL at 12:44

## 2021-10-23 RX ADMIN — SENNA PLUS 1 TABLET(S): 8.6 TABLET ORAL at 18:56

## 2021-10-23 RX ADMIN — HEPARIN SODIUM 5000 UNIT(S): 5000 INJECTION INTRAVENOUS; SUBCUTANEOUS at 22:01

## 2021-10-23 RX ADMIN — PANTOPRAZOLE SODIUM 40 MILLIGRAM(S): 20 TABLET, DELAYED RELEASE ORAL at 06:00

## 2021-10-23 RX ADMIN — SODIUM CHLORIDE 75 MILLILITER(S): 9 INJECTION, SOLUTION INTRAVENOUS at 06:16

## 2021-10-23 RX ADMIN — I.V. FAT EMULSION 23.5 GM/KG/DAY: 20 EMULSION INTRAVENOUS at 20:04

## 2021-10-23 RX ADMIN — LIDOCAINE 1 PATCH: 4 CREAM TOPICAL at 20:04

## 2021-10-23 NOTE — PROGRESS NOTE ADULT - SUBJECTIVE AND OBJECTIVE BOX
Chief Complaint: Patient is a 71y old  Male who presents with a chief complaint of SBO (23 Oct 2021 09:28) s/p fractured hip . gi coverage  for service. clinoically doing better  passing gas/ loose bowel movement feels better  less stomach distewnsion axr improving        Review Of Systems:    Medications:  acetaminophen   Tablet .. 650 milliGRAM(s) Oral every 6 hours PRN  acetaminophen   Tablet .. 650 milliGRAM(s) Oral every 6 hours PRN  amLODIPine   Tablet 10 milliGRAM(s) Oral daily  atorvastatin 40 milliGRAM(s) Oral at bedtime  dextrose 5% + sodium chloride 0.9%. 1000 milliLiter(s) IV Continuous <Continuous>  fat emulsion (Plant Based) 20% Infusion 0.71 Gm/kG/Day IV Continuous <Continuous>  heparin   Injectable 5000 Unit(s) SubCutaneous every 8 hours  influenza   Vaccine 0.5 milliLiter(s) IntraMuscular once  lidocaine   4% Patch 1 Patch Transdermal daily  ondansetron Injectable 4 milliGRAM(s) IV Push every 8 hours PRN  oxycodone    5 mG/acetaminophen 325 mG 1 Tablet(s) Oral every 4 hours PRN  pantoprazole  Injectable 40 milliGRAM(s) IV Push every 12 hours  Parenteral Nutrition - Adult 1 Each TPN Continuous <Continuous>  senna 1 Tablet(s) Oral two times a day  simethicone 80 milliGRAM(s) Chew every 8 hours PRN      PMHX/PSHX:  No pertinent past medical history    High blood cholesterol    Hypertension    GERD (gastroesophageal reflux disease)    High blood cholesterol    No significant past surgical history        Family history:      Social History:       Allergies:  No Known Allergies            PHYSICAL EXAM:   Vital Signs:  Vital Signs Last 24 Hrs  T(C): 35.8 (23 Oct 2021 13:20), Max: 36.8 (23 Oct 2021 05:22)  T(F): 96.5 (23 Oct 2021 13:20), Max: 98.3 (23 Oct 2021 05:22)  HR: 87 (23 Oct 2021 13:20) (75 - 87)  BP: 120/62 (23 Oct 2021 13:20) (120/62 - 162/72)  BP(mean): --  RR: 16 (23 Oct 2021 13:20) (16 - 18)  SpO2: --  Daily Height in cm: 165.1 (22 Oct 2021 20:39)    Daily     T(C): 35.8 (10-23-21 @ 13:20), Max: 36.8 (10-23-21 @ 05:22)  HR: 87 (10-23-21 @ 13:20) (75 - 87)  BP: 120/62 (10-23-21 @ 13:20) (120/62 - 162/72)  RR: 16 (10-23-21 @ 13:20) (16 - 18)  SpO2: --    GENERAL:  Appears stated age, well-groomed, well-nourished, no distress  ABDOMEN:  Soft, non-tender, non-distended, normoactive bowel sounds,  no masses ,no hepato-splenomegaly, no signs of chronic liver disease        LABS:                        11.2   9.23  )-----------( 444      ( 23 Oct 2021 07:36 )             34.3     10-23    142  |  109  |  10  ----------------------------<  111<H>  3.4<L>   |  20  |  1.0    Ca    9.0      23 Oct 2021 07:36  Mg     2.0     10-23    TPro  6.3  /  Alb  3.6  /  TBili  0.5  /  DBili  x   /  AST  37  /  ALT  42<H>  /  AlkPhos  90  10-23    LIVER FUNCTIONS - ( 23 Oct 2021 07:36 )  Alb: 3.6 g/dL / Pro: 6.3 g/dL / ALK PHOS: 90 U/L / ALT: 42 U/L / AST: 37 U/L / GGT: x                   Imaging:  10/23 axr improvement  with less distension

## 2021-10-23 NOTE — PROGRESS NOTE ADULT - ASSESSMENT
SBO vs illeus (colonic/ and small bowel) appers to be resolving s/p hip fx  plan- cont ngt / iv fluids . if continues to improve would start liquid diet tommorow  outpatient colonoscopy

## 2021-10-23 NOTE — PROGRESS NOTE ADULT - ASSESSMENT
71 year old male with a past medical history of GERD, HTN, HLD, H/O FB INGESTION S/P EGD -REMOVAL OF FB admitted to surgery for SBO. Patient is doing well, with no new complaints. Ng tube still in place.    Plan:  - continue NG tube   - NPO  - clamp NG tube for ambulation and advise patient to walk around  - AXR today   Patient is not obstructed, but abdominal xray still shows significant bowel distention.   GI planning on outpatient colonoscopy, but patient is not improving much clinically. Should consider inpatient colonoscopy.   Furthermore, will place nutrition consult for Dr. Mesa.

## 2021-10-23 NOTE — PROGRESS NOTE ADULT - ASSESSMENT
70 y/o M PMHx HTN and HLD BIBA present to the Ed s/p fall. Found to have nondisplaced left hip fracture, not needing surgery, course complicated by narcotic induced Ileus Vs SBO    # Nasuea/ vomiting/ abdominal distention 2/2 ileus due to narcotics Vs SBO?  - CT abdomen still with air fluid levels   PLAN   - Surg on board  - cont NGT for now; clamp NG tube for ambulation and advise patient to walk around  - GI re-eval requested to consider inpatient colonoscopy  - f/u nutrition eval for Dr. Mesa  - Avoid narcotics - Continue supportive care   - NPO  - IVF  - monitor electrolytes  - f/u AXR today     # Mechanical fall with left hip minimally displaced greater trochanter fx, no extension into intertrochanteric region  # Right ankle sprain s/p fall, improved   # R radial nerve paresis 2/2 fall, improved   - CT Left hip Nondisplaced fracture of the left greater trochanter.  - Rest of trauma workup has been negative; XR neg for shoulder dislocation   PLAN  - Pain control  - Ortho eval appreciated; No sx indicated per ortho     #HTN/HLD  - amlodipine 10mg qD  - atorvastatin 80mg qD    #WILL on CKD Stage 2/2 hypovolemia due to vomiting, improved    - cont IVF  - monitor BMP    # fever, resolved likely 2/2 atelectasis   - CXR- neg   - IS     DVT PPX, heparin SC    Dispo: remains acute for now; surg f/u; After ileus is resolved, needs rehab for non displaced hip fracture.

## 2021-10-23 NOTE — PROGRESS NOTE ADULT - SUBJECTIVE AND OBJECTIVE BOX
Patient is a 71y old  Male who presents with a chief complaint of fall     INTERVAL HPI/OVERNIGHT EVENTS:   Patient seen and examined at bedside; patient denies any abd pain, N/V. Otherwise reports he would like to walk with PT. He does report fo Left hip pain. He reports he spoke to surgical team this AM.    ROS: All systems review and otherwise negative      Vital Signs Last 24 Hrs  T(C): 36.8 (23 Oct 2021 05:22), Max: 36.8 (23 Oct 2021 05:22)  T(F): 98.3 (23 Oct 2021 05:22), Max: 98.3 (23 Oct 2021 05:22)  HR: 75 (23 Oct 2021 05:22) (75 - 79)  BP: 162/72 (23 Oct 2021 05:22) (141/71 - 162/72)  BP(mean): --  RR: 16 (23 Oct 2021 05:22) (16 - 18)  SpO2: --  PHYSICAL EXAM:  Gen: NAD, appears comfortable  HEENT: AT, NC, EOMI, MMM  LUNGS - no wheezing, no rales, no ronchi  HEART: S1 S2+, normal S1s2, no LE edema  ABDOMEN: Soft, Nontender, + distention,  BS hypoactive, +NG tube  EXTREMITIES:  2+ Peripheral Pulses, No clubbing, cyanosis, or edema, ROM of left hip limited due to pain, tender to palpation, +R ankle swelling (improving), +R shoulder tenderness cannot extend more than 90   NERVOUS SYSTEM:  Awake and alert; no focal neuro deficits appreciated                                       11.2   9.23  )-----------( 444      ( 23 Oct 2021 07:36 )             34.3   10-23    142  |  109  |  10  ----------------------------<  111<H>  3.4<L>   |  20  |  1.0    Ca    9.0      23 Oct 2021 07:36  Mg     2.0     10-23    TPro  6.3  /  Alb  3.6  /  TBili  0.5  /  DBili  x   /  AST  37  /  ALT  42<H>  /  AlkPhos  90  10-23    Lactate Trend    CAPILLARY BLOOD GLUCOSE      MEDICATIONS  (STANDING):  amLODIPine   Tablet 10 milliGRAM(s) Oral daily  atorvastatin 40 milliGRAM(s) Oral at bedtime  dextrose 5% + sodium chloride 0.9%. 1000 milliLiter(s) (75 mL/Hr) IV Continuous <Continuous>  fat emulsion (Plant Based) 20% Infusion 0.71 Gm/kG/Day (23.5 mL/Hr) IV Continuous <Continuous>  heparin   Injectable 5000 Unit(s) SubCutaneous every 8 hours  influenza   Vaccine 0.5 milliLiter(s) IntraMuscular once  lidocaine   4% Patch 1 Patch Transdermal daily  pantoprazole  Injectable 40 milliGRAM(s) IV Push every 12 hours  Parenteral Nutrition - Adult 1 Each (70 mL/Hr) TPN Continuous <Continuous>  potassium chloride  20 mEq/100 mL IVPB 20 milliEquivalent(s) IV Intermittent once  senna 1 Tablet(s) Oral two times a day    MEDICATIONS  (PRN):  acetaminophen   Tablet .. 650 milliGRAM(s) Oral every 6 hours PRN Mild Pain (1 - 3)  acetaminophen   Tablet .. 650 milliGRAM(s) Oral every 6 hours PRN Temp greater or equal to 38.5C (101.3F), Mild Pain (1 - 3), Moderate Pain (4 - 6), Severe Pain (7 - 10)  ondansetron Injectable 4 milliGRAM(s) IV Push every 8 hours PRN Nausea and/or Vomiting  oxycodone    5 mG/acetaminophen 325 mG 1 Tablet(s) Oral every 4 hours PRN Moderate Pain (4 - 6)  simethicone 80 milliGRAM(s) Chew every 8 hours PRN Gas

## 2021-10-24 LAB
ALBUMIN SERPL ELPH-MCNC: 3.4 G/DL — LOW (ref 3.5–5.2)
ALP SERPL-CCNC: 91 U/L — SIGNIFICANT CHANGE UP (ref 30–115)
ALT FLD-CCNC: 42 U/L — HIGH (ref 0–41)
ANION GAP SERPL CALC-SCNC: 13 MMOL/L — SIGNIFICANT CHANGE UP (ref 7–14)
AST SERPL-CCNC: 35 U/L — SIGNIFICANT CHANGE UP (ref 0–41)
BILIRUB SERPL-MCNC: 0.3 MG/DL — SIGNIFICANT CHANGE UP (ref 0.2–1.2)
BUN SERPL-MCNC: 11 MG/DL — SIGNIFICANT CHANGE UP (ref 10–20)
CALCIUM SERPL-MCNC: 8.7 MG/DL — SIGNIFICANT CHANGE UP (ref 8.5–10.1)
CHLORIDE SERPL-SCNC: 106 MMOL/L — SIGNIFICANT CHANGE UP (ref 98–110)
CO2 SERPL-SCNC: 21 MMOL/L — SIGNIFICANT CHANGE UP (ref 17–32)
CREAT SERPL-MCNC: 1 MG/DL — SIGNIFICANT CHANGE UP (ref 0.7–1.5)
GLUCOSE SERPL-MCNC: 100 MG/DL — HIGH (ref 70–99)
HCT VFR BLD CALC: 34.5 % — LOW (ref 42–52)
HGB BLD-MCNC: 11.3 G/DL — LOW (ref 14–18)
MAGNESIUM SERPL-MCNC: 2 MG/DL — SIGNIFICANT CHANGE UP (ref 1.8–2.4)
MCHC RBC-ENTMCNC: 29.3 PG — SIGNIFICANT CHANGE UP (ref 27–31)
MCHC RBC-ENTMCNC: 32.8 G/DL — SIGNIFICANT CHANGE UP (ref 32–37)
MCV RBC AUTO: 89.4 FL — SIGNIFICANT CHANGE UP (ref 80–94)
NRBC # BLD: 0 /100 WBCS — SIGNIFICANT CHANGE UP (ref 0–0)
PHOSPHATE SERPL-MCNC: 3.5 MG/DL — SIGNIFICANT CHANGE UP (ref 2.1–4.9)
PLATELET # BLD AUTO: 465 K/UL — HIGH (ref 130–400)
POTASSIUM SERPL-MCNC: 3.5 MMOL/L — SIGNIFICANT CHANGE UP (ref 3.5–5)
POTASSIUM SERPL-SCNC: 3.5 MMOL/L — SIGNIFICANT CHANGE UP (ref 3.5–5)
PROT SERPL-MCNC: 6 G/DL — SIGNIFICANT CHANGE UP (ref 6–8)
RBC # BLD: 3.86 M/UL — LOW (ref 4.7–6.1)
RBC # FLD: 12.9 % — SIGNIFICANT CHANGE UP (ref 11.5–14.5)
SODIUM SERPL-SCNC: 140 MMOL/L — SIGNIFICANT CHANGE UP (ref 135–146)
WBC # BLD: 11.65 K/UL — HIGH (ref 4.8–10.8)
WBC # FLD AUTO: 11.65 K/UL — HIGH (ref 4.8–10.8)

## 2021-10-24 PROCEDURE — 74018 RADEX ABDOMEN 1 VIEW: CPT | Mod: 26

## 2021-10-24 PROCEDURE — 99232 SBSQ HOSP IP/OBS MODERATE 35: CPT

## 2021-10-24 RX ORDER — ELECTROLYTE SOLUTION,INJ
1 VIAL (ML) INTRAVENOUS
Refills: 0 | Status: DISCONTINUED | OUTPATIENT
Start: 2021-10-24 | End: 2021-10-25

## 2021-10-24 RX ORDER — I.V. FAT EMULSION 20 G/100ML
0.71 EMULSION INTRAVENOUS
Qty: 75.26 | Refills: 0 | Status: DISCONTINUED | OUTPATIENT
Start: 2021-10-24 | End: 2021-10-24

## 2021-10-24 RX ORDER — IBUPROFEN 200 MG
400 TABLET ORAL EVERY 6 HOURS
Refills: 0 | Status: DISCONTINUED | OUTPATIENT
Start: 2021-10-24 | End: 2021-10-26

## 2021-10-24 RX ORDER — HYDROXYZINE HCL 10 MG
50 TABLET ORAL AT BEDTIME
Refills: 0 | Status: DISCONTINUED | OUTPATIENT
Start: 2021-10-24 | End: 2021-10-26

## 2021-10-24 RX ADMIN — HEPARIN SODIUM 5000 UNIT(S): 5000 INJECTION INTRAVENOUS; SUBCUTANEOUS at 21:49

## 2021-10-24 RX ADMIN — PANTOPRAZOLE SODIUM 40 MILLIGRAM(S): 20 TABLET, DELAYED RELEASE ORAL at 06:02

## 2021-10-24 RX ADMIN — Medication 50 MILLIGRAM(S): at 21:49

## 2021-10-24 RX ADMIN — PANTOPRAZOLE SODIUM 40 MILLIGRAM(S): 20 TABLET, DELAYED RELEASE ORAL at 18:04

## 2021-10-24 RX ADMIN — HEPARIN SODIUM 5000 UNIT(S): 5000 INJECTION INTRAVENOUS; SUBCUTANEOUS at 06:02

## 2021-10-24 RX ADMIN — I.V. FAT EMULSION 23.5 GM/KG/DAY: 20 EMULSION INTRAVENOUS at 20:33

## 2021-10-24 RX ADMIN — SENNA PLUS 1 TABLET(S): 8.6 TABLET ORAL at 06:02

## 2021-10-24 RX ADMIN — SENNA PLUS 1 TABLET(S): 8.6 TABLET ORAL at 18:03

## 2021-10-24 RX ADMIN — LIDOCAINE 1 PATCH: 4 CREAM TOPICAL at 00:41

## 2021-10-24 RX ADMIN — OXYCODONE AND ACETAMINOPHEN 1 TABLET(S): 5; 325 TABLET ORAL at 00:58

## 2021-10-24 RX ADMIN — ATORVASTATIN CALCIUM 40 MILLIGRAM(S): 80 TABLET, FILM COATED ORAL at 21:49

## 2021-10-24 RX ADMIN — Medication 400 MILLIGRAM(S): at 10:48

## 2021-10-24 RX ADMIN — OXYCODONE AND ACETAMINOPHEN 1 TABLET(S): 5; 325 TABLET ORAL at 01:28

## 2021-10-24 RX ADMIN — Medication 400 MILLIGRAM(S): at 18:19

## 2021-10-24 RX ADMIN — AMLODIPINE BESYLATE 10 MILLIGRAM(S): 2.5 TABLET ORAL at 06:02

## 2021-10-24 RX ADMIN — Medication 400 MILLIGRAM(S): at 10:28

## 2021-10-24 RX ADMIN — HEPARIN SODIUM 5000 UNIT(S): 5000 INJECTION INTRAVENOUS; SUBCUTANEOUS at 14:35

## 2021-10-24 RX ADMIN — Medication 1 EACH: at 20:32

## 2021-10-24 NOTE — PROGRESS NOTE ADULT - SUBJECTIVE AND OBJECTIVE BOX
Progress Note: General Surgery  Patient: YUMIKO GALINDO , 71y (1950)Male   MRN: [id number]  Location: [place] [address]  Visit: 10-13-21 Inpatient  Date: 10-24-21 @ 09:59    Admit Diagnosis/Chief Complaint:   Abdominal distention  Likely ileus, no sign of obstruction as patient is passing gas and having bowel movements daily.   NGT in place, to low continuous suction    Abdomen appears slightly less distended today. Will keep NGT for now.    Vitals: T(F): 98.9 (10-24-21 @ 04:42), Max: 98.9 (10-24-21 @ 04:42)  HR: 67 (10-24-21 @ 04:42)  BP: 147/68 (10-24-21 @ 04:42) (120/62 - 151/68)  RR: 18 (10-24-21 @ 04:42)  SpO2: --    In:   10-23-21 @ 07:01  -  10-24-21 @ 07:00  --------------------------------------------------------  IN: 1028.5 mL      Out:   10-23-21 @ 07:01  -  10-24-21 @ 07:00  --------------------------------------------------------  OUT:    Nasogastric/Oral tube (mL): 1000 mL    Voided (mL): 300 mL  Total OUT: 1300 mL        Net:   10-23-21 @ 07:01  -  10-24-21 @ 07:00  --------------------------------------------------------  NET: -271.5 mL        Diet: Diet, NPO:   Except Medications (10-20-21 @ 15:16)    IV Fluids: fat emulsion (Plant Based) 20% Infusion 0.71 Gm/kG/Day (23.5 mL/Hr) IV Continuous <Continuous>  Parenteral Nutrition - Adult 1 Each (70 mL/Hr) TPN Continuous <Continuous>      Physical Examination:  General Appearance: NAD   HEENT: EOMI, sclera non-icteric.  Heart: RRR   Lungs: CTABL.   Abdomen:  Soft,nontender, moderately distended.   MSK/Extremities: Warm & well-perfused.   Skin: Warm, dry. No jaundice.       Medications: [Standing]  amLODIPine   Tablet 10 milliGRAM(s) Oral daily  atorvastatin 40 milliGRAM(s) Oral at bedtime  fat emulsion (Plant Based) 20% Infusion 0.71 Gm/kG/Day (23.5 mL/Hr) IV Continuous <Continuous>  heparin   Injectable 5000 Unit(s) SubCutaneous every 8 hours  influenza   Vaccine 0.5 milliLiter(s) IntraMuscular once  lidocaine   4% Patch 1 Patch Transdermal daily  pantoprazole  Injectable 40 milliGRAM(s) IV Push every 12 hours  Parenteral Nutrition - Adult 1 Each (70 mL/Hr) TPN Continuous <Continuous>  senna 1 Tablet(s) Oral two times a day    DVT Prophylaxis: heparin   Injectable 5000 Unit(s) SubCutaneous every 8 hours    GI Prophylaxis: pantoprazole  Injectable 40 milliGRAM(s) IV Push every 12 hours    Antibiotics:   Anticoagulation:   Medications:[PRN]  acetaminophen   Tablet .. 650 milliGRAM(s) Oral every 6 hours PRN  acetaminophen   Tablet .. 650 milliGRAM(s) Oral every 6 hours PRN  ondansetron Injectable 4 milliGRAM(s) IV Push every 8 hours PRN  oxycodone    5 mG/acetaminophen 325 mG 1 Tablet(s) Oral every 4 hours PRN  simethicone 80 milliGRAM(s) Chew every 8 hours PRN      Labs:                        11.3   11.65 )-----------( 465      ( 24 Oct 2021 06:17 )             34.5     10-24    140  |  106  |  11  ----------------------------<  100<H>  3.5   |  21  |  1.0    Ca    8.7      24 Oct 2021 06:17  Phos  3.5     10-24  Mg     2.0     10-24    TPro  6.0  /  Alb  3.4<L>  /  TBili  0.3  /  DBili  x   /  AST  35  /  ALT  42<H>  /  AlkPhos  91  10-24    LIVER FUNCTIONS - ( 24 Oct 2021 06:17 )  Alb: 3.4 g/dL / Pro: 6.0 g/dL / ALK PHOS: 91 U/L / ALT: 42 U/L / AST: 35 U/L / GGT: x             iamging: gas-distended bowel loops remain throughout the abdomen and pelvis, however, somewhat decreased in severity/extent.

## 2021-10-24 NOTE — PROGRESS NOTE ADULT - SUBJECTIVE AND OBJECTIVE BOX
Chief Complaint: Patient is a 71y old  Male who presents with a chief complaint of fx hip (23 Oct 2021 17:19)        Review Of Systems:    Medications:  amLODIPine   Tablet 10 milliGRAM(s) Oral daily  atorvastatin 40 milliGRAM(s) Oral at bedtime  fat emulsion (Plant Based) 20% Infusion 0.71 Gm/kG/Day IV Continuous <Continuous>  fat emulsion (Plant Based) 20% Infusion 0.71 Gm/kG/Day IV Continuous <Continuous>  heparin   Injectable 5000 Unit(s) SubCutaneous every 8 hours  ibuprofen  Tablet. 400 milliGRAM(s) Oral every 6 hours PRN  influenza   Vaccine 0.5 milliLiter(s) IntraMuscular once  lidocaine   4% Patch 1 Patch Transdermal daily  ondansetron Injectable 4 milliGRAM(s) IV Push every 8 hours PRN  oxycodone    5 mG/acetaminophen 325 mG 1 Tablet(s) Oral every 4 hours PRN  pantoprazole  Injectable 40 milliGRAM(s) IV Push every 12 hours  Parenteral Nutrition - Adult 1 Each TPN Continuous <Continuous>  Parenteral Nutrition - Adult 1 Each TPN Continuous <Continuous>  senna 1 Tablet(s) Oral two times a day  simethicone 80 milliGRAM(s) Chew every 8 hours PRN      PMHX/PSHX:  No pertinent past medical history    High blood cholesterol    Hypertension    GERD (gastroesophageal reflux disease)    High blood cholesterol    No significant past surgical history        Family history:      Social History:       Allergies:  No Known Allergies            PHYSICAL EXAM:   Vital Signs:  Vital Signs Last 24 Hrs  T(C): 36.9 (24 Oct 2021 13:12), Max: 37.2 (24 Oct 2021 04:42)  T(F): 98.5 (24 Oct 2021 13:12), Max: 98.9 (24 Oct 2021 04:42)  HR: 74 (24 Oct 2021 13:12) (67 - 80)  BP: 152/70 (24 Oct 2021 13:12) (147/68 - 152/70)  BP(mean): --  RR: 16 (24 Oct 2021 13:12) (16 - 18)  SpO2: --  Daily     Daily     T(C): 36.9 (10-24-21 @ 13:12), Max: 37.2 (10-24-21 @ 04:42)  HR: 74 (10-24-21 @ 13:12) (67 - 80)  BP: 152/70 (10-24-21 @ 13:12) (147/68 - 152/70)  RR: 16 (10-24-21 @ 13:12) (16 - 18)  SpO2: --    GENERAL:  Appears stated age, well-groomed, well-nourished, no distress  ABDOMEN:  Soft, non-tender, non-distended, normoactive bowel sounds,  no masses ,no hepato-splenomegaly, no signs of chronic liver disease        LABS:                        11.3   11.65 )-----------( 465      ( 24 Oct 2021 06:17 )             34.5     10-24    140  |  106  |  11  ----------------------------<  100<H>  3.5   |  21  |  1.0    Ca    8.7      24 Oct 2021 06:17  Phos  3.5     10-24  Mg     2.0     10-24    TPro  6.0  /  Alb  3.4<L>  /  TBili  0.3  /  DBili  x   /  AST  35  /  ALT  42<H>  /  AlkPhos  91  10-24    LIVER FUNCTIONS - ( 24 Oct 2021 06:17 )  Alb: 3.4 g/dL / Pro: 6.0 g/dL / ALK PHOS: 91 U/L / ALT: 42 U/L / AST: 35 U/L / GGT: x                   Imaging:

## 2021-10-24 NOTE — PROGRESS NOTE ADULT - SUBJECTIVE AND OBJECTIVE BOX
Patient is a 71y old  Male who presents with a chief complaint of fall     INTERVAL HPI/OVERNIGHT EVENTS:   Patient seen and examined at bedside; patient denies any abd pain, N/V. He reports frustration that NGT has been there for a long time. Otherwise, he does report fo Left hip pain. He reports he spoke to surgical team this AM.    ROS: All systems review and otherwise negative      Vital Signs Last 24 Hrs  T(C): 36.9 (24 Oct 2021 13:12), Max: 37.2 (24 Oct 2021 04:42)  T(F): 98.5 (24 Oct 2021 13:12), Max: 98.9 (24 Oct 2021 04:42)  HR: 74 (24 Oct 2021 13:12) (67 - 80)  BP: 152/70 (24 Oct 2021 13:12) (147/68 - 152/70)  BP(mean): --  RR: 16 (24 Oct 2021 13:12) (16 - 18)  SpO2: --  PHYSICAL EXAM:  Gen: NAD, appears comfortable  HEENT: AT, NC, EOMI, MMM  LUNGS - no wheezing, no rales, no ronchi  HEART: S1 S2+, normal S1s2, no LE edema  ABDOMEN: Soft, Nontender, + distention,  BS hypoactive, +NG tube  EXTREMITIES:  2+ Peripheral Pulses, No clubbing, cyanosis, or edema, ROM of left hip limited due to pain, tender to palpation, +R ankle swelling (improving), +R shoulder tenderness cannot extend more than 90   NERVOUS SYSTEM:  Awake and alert; no focal neuro deficits appreciated                                     11.3   11.65 )-----------( 465      ( 24 Oct 2021 06:17 )             34.5   10-24    140  |  106  |  11  ----------------------------<  100<H>  3.5   |  21  |  1.0    Ca    8.7      24 Oct 2021 06:17  Phos  3.5     10-24  Mg     2.0     10-24    TPro  6.0  /  Alb  3.4<L>  /  TBili  0.3  /  DBili  x   /  AST  35  /  ALT  42<H>  /  AlkPhos  91  10-24      MEDICATIONS  (STANDING):  amLODIPine   Tablet 10 milliGRAM(s) Oral daily  atorvastatin 40 milliGRAM(s) Oral at bedtime  fat emulsion (Plant Based) 20% Infusion 0.71 Gm/kG/Day (23.5 mL/Hr) IV Continuous <Continuous>  fat emulsion (Plant Based) 20% Infusion 0.71 Gm/kG/Day (23.5 mL/Hr) IV Continuous <Continuous>  heparin   Injectable 5000 Unit(s) SubCutaneous every 8 hours  influenza   Vaccine 0.5 milliLiter(s) IntraMuscular once  lidocaine   4% Patch 1 Patch Transdermal daily  pantoprazole  Injectable 40 milliGRAM(s) IV Push every 12 hours  Parenteral Nutrition - Adult 1 Each (70 mL/Hr) TPN Continuous <Continuous>  Parenteral Nutrition - Adult 1 Each (70 mL/Hr) TPN Continuous <Continuous>  senna 1 Tablet(s) Oral two times a day    MEDICATIONS  (PRN):  ibuprofen  Tablet. 400 milliGRAM(s) Oral every 6 hours PRN Temp greater or equal to 38C (100.4F), Mild Pain (1 - 3)  ondansetron Injectable 4 milliGRAM(s) IV Push every 8 hours PRN Nausea and/or Vomiting  oxycodone    5 mG/acetaminophen 325 mG 1 Tablet(s) Oral every 4 hours PRN Moderate Pain (4 - 6)  simethicone 80 milliGRAM(s) Chew every 8 hours PRN Gas

## 2021-10-24 NOTE — PROGRESS NOTE ADULT - ASSESSMENT
probable small bowel /colonic ileus -resolving  plan- would attempt clear liquid in am if surgery agrees  no need for endoscopic intervention at this time   needs outpatient colonoscopy

## 2021-10-24 NOTE — PROGRESS NOTE ADULT - ASSESSMENT
Assessment:  71y Male patient admitted S/P hip fracture, surgery consulted for bowel distention. Patient likely with ileus.  Patient seen and examined at bedside. NAD.     Plan:  - keep NGT to low continuous suction for now  - Keep NPO  - Serial abdominal exams  - Repeat plain film of abdomen today  - Ensure plan in place for physical therapy and rehab  - Nutrition plans as per Dr. Mesa's team, continue ppn      Date/Time: 10-24-21 @ 09:59

## 2021-10-24 NOTE — PROGRESS NOTE ADULT - ASSESSMENT
72 y/o M PMHx HTN and HLD BIBA present to the Ed s/p fall. Found to have nondisplaced left hip fracture, not needing surgery, course complicated by narcotic induced Ileus Vs SBO    # Nasuea/ vomiting/ abdominal distention 2/2 ileus due to narcotics Vs SBO?  - CT abdomen still with air fluid levels   PLAN   - Surg on board  - cont NGT to low cont suction for now  - GI f/u appreciated  - started on PPN today, per Dr. Mesa's team  - Avoid narcotics - Continue supportive care   - NPO  - IVF  - monitor electrolytes  - f/u AXR today     # Mechanical fall with left hip minimally displaced greater trochanter fx, no extension into intertrochanteric region  # Right ankle sprain s/p fall, improved   # R radial nerve paresis 2/2 fall, improved   - CT Left hip Nondisplaced fracture of the left greater trochanter.  - Rest of trauma workup has been negative; XR neg for shoulder dislocation   PLAN  - Pain control  - Ortho eval appreciated; No sx indicated per ortho     #HTN/HLD  - amlodipine 10mg qD  - atorvastatin 80mg qD    #WILL on CKD Stage 2/2 hypovolemia due to vomiting, improved    - cont IVF  - monitor BMP    # fever, resolved likely 2/2 atelectasis   - CXR- neg   - IS     DVT PPX, heparin SC    Dispo: remains acute for now; surg f/u; After ileus is resolved, needs rehab for non displaced hip fracture.

## 2021-10-25 LAB
ALBUMIN SERPL ELPH-MCNC: 3.6 G/DL — SIGNIFICANT CHANGE UP (ref 3.5–5.2)
ALP SERPL-CCNC: 102 U/L — SIGNIFICANT CHANGE UP (ref 30–115)
ALT FLD-CCNC: 55 U/L — HIGH (ref 0–41)
ANION GAP SERPL CALC-SCNC: 15 MMOL/L — HIGH (ref 7–14)
AST SERPL-CCNC: 49 U/L — HIGH (ref 0–41)
BILIRUB SERPL-MCNC: 0.3 MG/DL — SIGNIFICANT CHANGE UP (ref 0.2–1.2)
BUN SERPL-MCNC: 14 MG/DL — SIGNIFICANT CHANGE UP (ref 10–20)
CALCIUM SERPL-MCNC: 8.8 MG/DL — SIGNIFICANT CHANGE UP (ref 8.5–10.1)
CHLORIDE SERPL-SCNC: 109 MMOL/L — SIGNIFICANT CHANGE UP (ref 98–110)
CO2 SERPL-SCNC: 20 MMOL/L — SIGNIFICANT CHANGE UP (ref 17–32)
CREAT SERPL-MCNC: 0.9 MG/DL — SIGNIFICANT CHANGE UP (ref 0.7–1.5)
GLUCOSE SERPL-MCNC: 95 MG/DL — SIGNIFICANT CHANGE UP (ref 70–99)
HCT VFR BLD CALC: 35.1 % — LOW (ref 42–52)
HGB BLD-MCNC: 11.6 G/DL — LOW (ref 14–18)
MAGNESIUM SERPL-MCNC: 2.3 MG/DL — SIGNIFICANT CHANGE UP (ref 1.8–2.4)
MCHC RBC-ENTMCNC: 29.6 PG — SIGNIFICANT CHANGE UP (ref 27–31)
MCHC RBC-ENTMCNC: 33 G/DL — SIGNIFICANT CHANGE UP (ref 32–37)
MCV RBC AUTO: 89.5 FL — SIGNIFICANT CHANGE UP (ref 80–94)
NRBC # BLD: 0 /100 WBCS — SIGNIFICANT CHANGE UP (ref 0–0)
PHOSPHATE SERPL-MCNC: 3.7 MG/DL — SIGNIFICANT CHANGE UP (ref 2.1–4.9)
PLATELET # BLD AUTO: 467 K/UL — HIGH (ref 130–400)
POTASSIUM SERPL-MCNC: 3.9 MMOL/L — SIGNIFICANT CHANGE UP (ref 3.5–5)
POTASSIUM SERPL-SCNC: 3.9 MMOL/L — SIGNIFICANT CHANGE UP (ref 3.5–5)
PROT SERPL-MCNC: 6.3 G/DL — SIGNIFICANT CHANGE UP (ref 6–8)
RBC # BLD: 3.92 M/UL — LOW (ref 4.7–6.1)
RBC # FLD: 13 % — SIGNIFICANT CHANGE UP (ref 11.5–14.5)
SODIUM SERPL-SCNC: 144 MMOL/L — SIGNIFICANT CHANGE UP (ref 135–146)
WBC # BLD: 11.79 K/UL — HIGH (ref 4.8–10.8)
WBC # FLD AUTO: 11.79 K/UL — HIGH (ref 4.8–10.8)

## 2021-10-25 PROCEDURE — 99232 SBSQ HOSP IP/OBS MODERATE 35: CPT

## 2021-10-25 PROCEDURE — 93970 EXTREMITY STUDY: CPT | Mod: 26

## 2021-10-25 RX ORDER — I.V. FAT EMULSION 20 G/100ML
0.71 EMULSION INTRAVENOUS
Qty: 75.26 | Refills: 0 | Status: DISCONTINUED | OUTPATIENT
Start: 2021-10-25 | End: 2021-10-25

## 2021-10-25 RX ORDER — ELECTROLYTE SOLUTION,INJ
1 VIAL (ML) INTRAVENOUS
Refills: 0 | Status: DISCONTINUED | OUTPATIENT
Start: 2021-10-25 | End: 2021-10-25

## 2021-10-25 RX ORDER — PANTOPRAZOLE SODIUM 20 MG/1
40 TABLET, DELAYED RELEASE ORAL
Refills: 0 | Status: DISCONTINUED | OUTPATIENT
Start: 2021-10-25 | End: 2021-10-26

## 2021-10-25 RX ADMIN — Medication 400 MILLIGRAM(S): at 21:03

## 2021-10-25 RX ADMIN — Medication 400 MILLIGRAM(S): at 21:34

## 2021-10-25 RX ADMIN — AMLODIPINE BESYLATE 10 MILLIGRAM(S): 2.5 TABLET ORAL at 05:19

## 2021-10-25 RX ADMIN — HEPARIN SODIUM 5000 UNIT(S): 5000 INJECTION INTRAVENOUS; SUBCUTANEOUS at 21:02

## 2021-10-25 RX ADMIN — PANTOPRAZOLE SODIUM 40 MILLIGRAM(S): 20 TABLET, DELAYED RELEASE ORAL at 05:19

## 2021-10-25 RX ADMIN — SENNA PLUS 1 TABLET(S): 8.6 TABLET ORAL at 05:19

## 2021-10-25 RX ADMIN — LIDOCAINE 1 PATCH: 4 CREAM TOPICAL at 12:02

## 2021-10-25 RX ADMIN — SENNA PLUS 1 TABLET(S): 8.6 TABLET ORAL at 18:09

## 2021-10-25 RX ADMIN — ATORVASTATIN CALCIUM 40 MILLIGRAM(S): 80 TABLET, FILM COATED ORAL at 21:02

## 2021-10-25 RX ADMIN — LIDOCAINE 1 PATCH: 4 CREAM TOPICAL at 21:02

## 2021-10-25 RX ADMIN — HEPARIN SODIUM 5000 UNIT(S): 5000 INJECTION INTRAVENOUS; SUBCUTANEOUS at 05:19

## 2021-10-25 RX ADMIN — HEPARIN SODIUM 5000 UNIT(S): 5000 INJECTION INTRAVENOUS; SUBCUTANEOUS at 13:36

## 2021-10-25 RX ADMIN — PANTOPRAZOLE SODIUM 40 MILLIGRAM(S): 20 TABLET, DELAYED RELEASE ORAL at 18:09

## 2021-10-25 NOTE — CHART NOTE - NSCHARTNOTEFT_GEN_A_CORE
Asked to do COVID swab for DC planning    Tolerated well
Called by radiology, studies indicate SBO with transition point. I called surgical PA with result. If any further vomiting, will place NGT
Patient still with NGT to low continuous suction. Passing gas and having bowel movements. Patient is not obstructed, but abdominal xray still shows significant bowel distention.   GI planning on outpatient colonoscopy, but patient is not improving much clinically. Should consider inpatient colonoscopy.   Furthermore, will place nutrition consult for Dr. Mesa.    Abdomen still distended, mildly improved on exam, and entirely nontender.
Patient with a greater trochanter fx - ortho recommended MRI to r/o IT extension. MRI ordered. if no IT extension then can be wbat, if IT extension please contact ortho. Thank you.
RN notified PA of patient c/o abdominal pain.  Pt states has worsened throughout the day w/ decrease in appetite,   nausea - no vomiting - and distention.  Abdomen: +BS , soft distended mild generalized tenderness  Will order Flat plate abdominal xray  RN aware
Patient is doing well. Abdomen much more soft and less distended. Advanced to clear liquid this morning.  Patient can be advanced as tolerated. Encourage ambulation. Avoid opioids.  Patient is cleared from surgery perspective once he proves he can tolerate solid food.  Can f/u with GI for C-Scope as outpatient.  Can f/u with Dr. Pena as outpatient for any surgical needs.

## 2021-10-25 NOTE — PROGRESS NOTE ADULT - ASSESSMENT
71 year old male with a past medical history of HTN, HLD BIBA chief complaint of fall, now SBO/colonic ileus.     Plan:   - D/C NG tube this am  - Advance to clear liquid diet   - encourage ambulation  - IVF  - Outpatient colonoscopy    71 year old male with a past medical history of HTN, HLD BIBA chief complaint of fall, now SBO/colonic ileus.     Plan:   - D/C NG tube this am  - Advance to clear liquid diet   - Avoid opiates  - encourage ambulation  - IVF  - Outpatient colonoscopy    71 year old male with a past medical history of HTN, HLD BIBA chief complaint of fall, now SBO/colonic ileus.     Plan:   - D/C NG tube this am  - Advance to clear liquid diet   - Avoid opiates  - encourage ambulation  - IVF  - Outpatient colonoscopy   - inpatient duplex bilateral lower extremities

## 2021-10-25 NOTE — PROGRESS NOTE ADULT - SUBJECTIVE AND OBJECTIVE BOX
71 year old male with a chief complaint of fall, admitted for SBO/colonic ileus that is resolving. Patient this morning had no new complaints. Patient is passing gas and his last bowel movement was earlier this morning. Patient described his stool as mostly watery. Patient states his abdomen is not as distended and feels a lot softer than it was 2 days ago. Patient denies any nausea/vomiting, fever/chills, fatigue/weakness, cough, chest pain or shortness of breath.     MEDICATIONS  (STANDING):  amLODIPine   Tablet 10 milliGRAM(s) Oral daily  atorvastatin 40 milliGRAM(s) Oral at bedtime  fat emulsion (Plant Based) 20% Infusion 0.71 Gm/kG/Day (23.5 mL/Hr) IV Continuous <Continuous>  heparin   Injectable 5000 Unit(s) SubCutaneous every 8 hours  influenza   Vaccine 0.5 milliLiter(s) IntraMuscular once  lidocaine   4% Patch 1 Patch Transdermal daily  pantoprazole  Injectable 40 milliGRAM(s) IV Push every 12 hours  Parenteral Nutrition - Adult 1 Each (70 mL/Hr) TPN Continuous <Continuous>  senna 1 Tablet(s) Oral two times a day    MEDICATIONS  (PRN):  hydrOXYzine hydrochloride 50 milliGRAM(s) Oral at bedtime PRN Restlessness  ibuprofen  Tablet. 400 milliGRAM(s) Oral every 6 hours PRN Temp greater or equal to 38C (100.4F), Mild Pain (1 - 3)  ondansetron Injectable 4 milliGRAM(s) IV Push every 8 hours PRN Nausea and/or Vomiting  oxycodone    5 mG/acetaminophen 325 mG 1 Tablet(s) Oral every 4 hours PRN Moderate Pain (4 - 6)  simethicone 80 milliGRAM(s) Chew every 8 hours PRN Gas    Vital Signs Last 24 Hrs  T(C): 36.4 (25 Oct 2021 04:51), Max: 37 (24 Oct 2021 20:54)  T(F): 97.6 (25 Oct 2021 04:51), Max: 98.6 (24 Oct 2021 20:54)  HR: 78 (25 Oct 2021 04:51) (74 - 78)  BP: 149/70 (25 Oct 2021 04:51) (148/77 - 152/70)  BP(mean): --  RR: 16 (25 Oct 2021 04:51) (16 - 16)  SpO2: --    Physical Exam:   Constitutional: Patient is laying in bed, is comfortable, in no apparent distress  Respiratory: Equal breath sounds B/L, no rales, rhonchi or wheezes  Cardio: Normal S1 and S2, no murmurs, rubs or gallops   Abdomen: Soft, non-tender, + distention, normal bowel sounds in all 4 quadrants     Complete Blood Count in AM (10.25.21 @ 06:03)    Nucleated RBC: 0 /100 WBCs    WBC Count: 11.79 K/uL    RBC Count: 3.92 M/uL    Hemoglobin: 11.6 g/dL    Hematocrit: 35.1 %    Mean Cell Volume: 89.5 fL    Mean Cell Hemoglobin: 29.6 pg    Mean Cell Hemoglobin Conc: 33.0 g/dL    Red Cell Distrib Width: 13.0 %    Platelet Count - Automated: 467 K/uL     71 year old male with a chief complaint of fall, admitted for SBO/colonic ileus that is resolving. Patient this morning had no new complaints. Patient is passing gas and his last bowel movement was earlier this morning. Patient described his stool as mostly watery. Patient states his abdomen is not as distended and feels a lot softer than it was 2 days ago. Patient denies any nausea/vomiting, fever/chills, fatigue/weakness, cough, chest pain or shortness of breath.     MEDICATIONS  (STANDING):  amLODIPine   Tablet 10 milliGRAM(s) Oral daily  atorvastatin 40 milliGRAM(s) Oral at bedtime  fat emulsion (Plant Based) 20% Infusion 0.71 Gm/kG/Day (23.5 mL/Hr) IV Continuous <Continuous>  heparin   Injectable 5000 Unit(s) SubCutaneous every 8 hours  influenza   Vaccine 0.5 milliLiter(s) IntraMuscular once  lidocaine   4% Patch 1 Patch Transdermal daily  pantoprazole  Injectable 40 milliGRAM(s) IV Push every 12 hours  Parenteral Nutrition - Adult 1 Each (70 mL/Hr) TPN Continuous <Continuous>  senna 1 Tablet(s) Oral two times a day    MEDICATIONS  (PRN):  hydrOXYzine hydrochloride 50 milliGRAM(s) Oral at bedtime PRN Restlessness  ibuprofen  Tablet. 400 milliGRAM(s) Oral every 6 hours PRN Temp greater or equal to 38C (100.4F), Mild Pain (1 - 3)  ondansetron Injectable 4 milliGRAM(s) IV Push every 8 hours PRN Nausea and/or Vomiting  oxycodone    5 mG/acetaminophen 325 mG 1 Tablet(s) Oral every 4 hours PRN Moderate Pain (4 - 6)  simethicone 80 milliGRAM(s) Chew every 8 hours PRN Gas    Vital Signs Last 24 Hrs  T(C): 36.4 (25 Oct 2021 04:51), Max: 37 (24 Oct 2021 20:54)  T(F): 97.6 (25 Oct 2021 04:51), Max: 98.6 (24 Oct 2021 20:54)  HR: 78 (25 Oct 2021 04:51) (74 - 78)  BP: 149/70 (25 Oct 2021 04:51) (148/77 - 152/70)  BP(mean): --  RR: 16 (25 Oct 2021 04:51) (16 - 16)  SpO2: --    Physical Exam:   Constitutional: Patient is laying in bed, is comfortable, in no apparent distress  Respiratory: Equal breath sounds B/L, no rales, rhonchi or wheezes  Cardio: Normal S1 and S2, no murmurs, rubs or gallops   Abdomen: Soft, non-tender, + distention, normal bowel sounds in all 4 quadrants     10-25    144  |  109  |  14  ----------------------------<  95  3.9   |  20  |  0.9    Ca    8.8      25 Oct 2021 06:03  Phos  3.7     10-25  Mg     2.3     10-25    TPro  6.3  /  Alb  3.6  /  TBili  0.3  /  DBili  x   /  AST  49<H>  /  ALT  55<H>  /  AlkPhos  102  10-25                            11.6   11.79 )-----------( 467      ( 25 Oct 2021 06:03 )             35.1     CAPILLARY BLOOD GLUCOSE

## 2021-10-25 NOTE — PROGRESS NOTE ADULT - SUBJECTIVE AND OBJECTIVE BOX
Patient is a 71y old  Male who presents with a chief complaint of fall     INTERVAL HPI/OVERNIGHT EVENTS:   looks and feels much better  ngt out  CLD tolerated  +flatus   no formed bm yet       ROS: All systems review and otherwise negative    ICU Vital Signs Last 24 Hrs  T(C): 36.6 (25 Oct 2021 13:37), Max: 37 (24 Oct 2021 20:54)  T(F): 97.9 (25 Oct 2021 13:37), Max: 98.6 (24 Oct 2021 20:54)  HR: 75 (25 Oct 2021 13:37) (75 - 78)  BP: 130/60 (25 Oct 2021 13:37) (130/60 - 149/70)  BP(mean): --  ABP: --  ABP(mean): --  RR: 16 (25 Oct 2021 13:37) (16 - 16)  SpO2: --  PHYSICAL EXAM:  Gen: NAD, appears comfortable  HEENT: AT, NC, EOMI, MMM  LUNGS - no wheezing, no rales, no ronchi  HEART: S1 S2+, normal S1s2, no LE edema  ABDOMEN: Soft, Nontender, + distention,  BS hypoactive, +NG tube  EXTREMITIES:  2+ Peripheral Pulses, No clubbing, cyanosis, or edema, ROM of left hip limited due to pain, tender to palpation, +R ankle swelling (improving), +R shoulder tenderness cannot extend more than 90   NERVOUS SYSTEM:  Awake and alert; no focal neuro deficits appreciated                                     11.3   11.65 )-----------( 465      ( 24 Oct 2021 06:17 )             34.5   10-24    140  |  106  |  11  ----------------------------<  100<H>  3.5   |  21  |  1.0    Ca    8.7      24 Oct 2021 06:17  Phos  3.5     10-24  Mg     2.0     10-24    TPro  6.0  /  Alb  3.4<L>  /  TBili  0.3  /  DBili  x   /  AST  35  /  ALT  42<H>  /  AlkPhos  91  10-24      MEDICATIONS  (STANDING):  amLODIPine   Tablet 10 milliGRAM(s) Oral daily  atorvastatin 40 milliGRAM(s) Oral at bedtime  fat emulsion (Plant Based) 20% Infusion 0.71 Gm/kG/Day (23.5 mL/Hr) IV Continuous <Continuous>  fat emulsion (Plant Based) 20% Infusion 0.71 Gm/kG/Day (23.5 mL/Hr) IV Continuous <Continuous>  heparin   Injectable 5000 Unit(s) SubCutaneous every 8 hours  influenza   Vaccine 0.5 milliLiter(s) IntraMuscular once  lidocaine   4% Patch 1 Patch Transdermal daily  pantoprazole  Injectable 40 milliGRAM(s) IV Push every 12 hours  Parenteral Nutrition - Adult 1 Each (70 mL/Hr) TPN Continuous <Continuous>  Parenteral Nutrition - Adult 1 Each (70 mL/Hr) TPN Continuous <Continuous>  senna 1 Tablet(s) Oral two times a day    MEDICATIONS  (PRN):  ibuprofen  Tablet. 400 milliGRAM(s) Oral every 6 hours PRN Temp greater or equal to 38C (100.4F), Mild Pain (1 - 3)  ondansetron Injectable 4 milliGRAM(s) IV Push every 8 hours PRN Nausea and/or Vomiting  oxycodone    5 mG/acetaminophen 325 mG 1 Tablet(s) Oral every 4 hours PRN Moderate Pain (4 - 6)  simethicone 80 milliGRAM(s) Chew every 8 hours PRN Gas

## 2021-10-25 NOTE — PROGRESS NOTE ADULT - ASSESSMENT
70 y/o M PMHx HTN and HLD BIBA present to the Ed s/p fall. Found to have nondisplaced left hip fracture, not needing surgery, course complicated by narcotic induced Ileus Vs SBO    # Nasuea/ vomiting/ abdominal distention 2/2 ileus due to narcotics - resolving   - NGT out - tolerating CLD - will advance to soft tonight   - if tolerated diet tomorrow can dc ppn  - Avoid narcotics - Continue supportive care       # Mechanical fall with left hip minimally displaced greater trochanter fx, no extension into intertrochanteric region - nonoperative tx - PT - sNF    # Right ankle sprain s/p fall, improved   # R radial nerve paresis 2/2 fall, improved   - CT Left hip Nondisplaced fracture of the left greater trochanter.  - Rest of trauma workup has been negative; XR neg for shoulder dislocation   PLAN  - Pain control  - Ortho eval appreciated; No sx indicated per ortho     #HTN/HLD  - amlodipine 10mg qD  - atorvastatin 80mg qD    #WILL on CKD Stage 2/2 hypovolemia due to vomiting, improved    - cont IVF  - monitor BMP    # fever, resolved likely 2/2 atelectasis   - CXR- neg   - IS     DVT PPX, heparin SC      if tolerated diet   then anticipate dc to snf  routine covid swab - Pa aware

## 2021-10-25 NOTE — PROGRESS NOTE ADULT - ATTENDING COMMENTS
above noted discussed case with surgical resident having bm but still distended will discuss case with GI
above noted examined pt on 10/20 discussed case with pt and surgical resident abdomen soft distension 3+ needs repeat ct scan
above noted
I edited the note
above noted discussed case with surgical resident abdomen soft distension less having bm and flatus
above noted discussed case with surgical resident abdomen soft distension less no tenderness having bm and flatus tolerating diet
above noted discussed case with surgical resident ct scan and labs noted on IV AB

## 2021-10-25 NOTE — PROGRESS NOTE ADULT - SUBJECTIVE AND OBJECTIVE BOX
Gastroenterology progress note:     Patient is a 71y old  Male who presents with a chief complaint of SBO (25 Oct 2021 08:20)       Admitted on: 10-13-21    We are following the patient for: resolving ileus, colonic distension     Interval History:  doing well on liquid diet, having semiformed to loose bm's, passing gas.         PAST MEDICAL & SURGICAL HISTORY:  High blood cholesterol    Hypertension    GERD (gastroesophageal reflux disease)    No significant past surgical history        MEDICATIONS  (STANDING):  amLODIPine   Tablet 10 milliGRAM(s) Oral daily  atorvastatin 40 milliGRAM(s) Oral at bedtime  fat emulsion (Plant Based) 20% Infusion 0.71 Gm/kG/Day (23.5 mL/Hr) IV Continuous <Continuous>  fat emulsion (Plant Based) 20% Infusion 0.71 Gm/kG/Day (23.5 mL/Hr) IV Continuous <Continuous>  heparin   Injectable 5000 Unit(s) SubCutaneous every 8 hours  influenza   Vaccine 0.5 milliLiter(s) IntraMuscular once  lidocaine   4% Patch 1 Patch Transdermal daily  pantoprazole  Injectable 40 milliGRAM(s) IV Push every 12 hours  Parenteral Nutrition - Adult 1 Each (70 mL/Hr) TPN Continuous <Continuous>  Parenteral Nutrition - Adult 1 Each (70 mL/Hr) TPN Continuous <Continuous>  senna 1 Tablet(s) Oral two times a day    MEDICATIONS  (PRN):  hydrOXYzine hydrochloride 50 milliGRAM(s) Oral at bedtime PRN Restlessness  ibuprofen  Tablet. 400 milliGRAM(s) Oral every 6 hours PRN Temp greater or equal to 38C (100.4F), Mild Pain (1 - 3)  ondansetron Injectable 4 milliGRAM(s) IV Push every 8 hours PRN Nausea and/or Vomiting  oxycodone    5 mG/acetaminophen 325 mG 1 Tablet(s) Oral every 4 hours PRN Moderate Pain (4 - 6)  simethicone 80 milliGRAM(s) Chew every 8 hours PRN Gas      Allergies  No Known Allergies    Social History:  Denies smoking, drinking, drug abuse (13 Oct 2021 18:34)    FAMILY HISTORY:    Review of Systems:   Cardiovascular:  No Chest Pain, No Palpitations  Respiratory:  No Cough, No Dyspnea  Gastrointestinal:  As described in HPI    Physical Examination:  T(C): 36.6 (10-25-21 @ 13:37), Max: 37 (10-24-21 @ 20:54)  HR: 75 (10-25-21 @ 13:37) (75 - 78)  BP: 130/60 (10-25-21 @ 13:37) (130/60 - 149/70)  RR: 16 (10-25-21 @ 13:37) (16 - 16)  SpO2: --      10-24-21 @ 07:01  -  10-25-21 @ 07:00  --------------------------------------------------------  IN: 1028.5 mL / OUT: 1300 mL / NET: -271.5 mL      Constitutional: No acute distress.  Ears, Nose, Mouth, throat: normal external ear, nose normal in appearance, mouth : no gross lesions  Respiratory:  No signs of respiratory distress. Lung sounds are clear bilaterally.  Cardiovascular:  S1 S2, Regular rate and rhythm.  GI/Abdominal: Abdomen is soft, symmetric, and non-tender without distention. There are no visible lesions or scars. Bowel sounds are present and normoactive in all four quadrants. No masses, hepatomegaly, or splenomegaly are noted.   Musculoskelatal: no obvious deformity or fracture  Skin: No rashes, No Jaundice.  Psych: alert, awake        Data:                        11.6   11.79 )-----------( 467      ( 25 Oct 2021 06:03 )             35.1     Hgb trend:  11.6  10-25-21 @ 06:03  11.3  10-24-21 @ 06:17  11.2  10-23-21 @ 07:36        10-25    144  |  109  |  14  ----------------------------<  95  3.9   |  20  |  0.9    Ca    8.8      25 Oct 2021 06:03  Phos  3.7     10-25  Mg     2.3     10-25    TPro  6.3  /  Alb  3.6  /  TBili  0.3  /  DBili  x   /  AST  49<H>  /  ALT  55<H>  /  AlkPhos  102  10-25    Liver panel trend:  TBili 0.3   /   AST 49   /   ALT 55   /   AlkP 102   /   Tptn 6.3   /   Alb 3.6    /   DBili --      10-25  TBili 0.3   /   AST 35   /   ALT 42   /   AlkP 91   /   Tptn 6.0   /   Alb 3.4    /   DBili --      10-24  TBili 0.5   /   AST 37   /   ALT 42   /   AlkP 90   /   Tptn 6.3   /   Alb 3.6    /   DBili --      10-23  TBili 0.5   /   AST 34   /   ALT 35   /   AlkP 79   /   Tptn 6.0   /   Alb 3.7    /   DBili --      10-22  TBili 0.6   /   AST 29   /   ALT 29   /   AlkP 78   /   Tptn 6.5   /   Alb 3.8    /   DBili --      10-21  TBili 0.6   /   AST 20   /   ALT 17   /   AlkP 82   /   Tptn 7.0   /   Alb 3.8    /   DBili --      10-20  TBili QNS   /   AST 40   /   ALT 16   /   AlkP 92   /   Tptn QNS   /   Alb 4.0    /   DBili --      10-19  TBili 0.7   /   AST 19   /   ALT 13   /   AlkP 83   /   Tptn 7.0   /   Alb 4.0    /   DBili --      10-17             Radiology:

## 2021-10-26 ENCOUNTER — TRANSCRIPTION ENCOUNTER (OUTPATIENT)
Age: 71
End: 2021-10-26

## 2021-10-26 VITALS
HEART RATE: 79 BPM | TEMPERATURE: 98 F | SYSTOLIC BLOOD PRESSURE: 121 MMHG | RESPIRATION RATE: 16 BRPM | DIASTOLIC BLOOD PRESSURE: 68 MMHG

## 2021-10-26 LAB
ANION GAP SERPL CALC-SCNC: 13 MMOL/L — SIGNIFICANT CHANGE UP (ref 7–14)
BUN SERPL-MCNC: 16 MG/DL — SIGNIFICANT CHANGE UP (ref 10–20)
CALCIUM SERPL-MCNC: 9.1 MG/DL — SIGNIFICANT CHANGE UP (ref 8.5–10.1)
CHLORIDE SERPL-SCNC: 107 MMOL/L — SIGNIFICANT CHANGE UP (ref 98–110)
CO2 SERPL-SCNC: 21 MMOL/L — SIGNIFICANT CHANGE UP (ref 17–32)
CREAT SERPL-MCNC: 0.9 MG/DL — SIGNIFICANT CHANGE UP (ref 0.7–1.5)
GLUCOSE SERPL-MCNC: 86 MG/DL — SIGNIFICANT CHANGE UP (ref 70–99)
MAGNESIUM SERPL-MCNC: 2.2 MG/DL — SIGNIFICANT CHANGE UP (ref 1.8–2.4)
PHOSPHATE SERPL-MCNC: 4.7 MG/DL — SIGNIFICANT CHANGE UP (ref 2.1–4.9)
POTASSIUM SERPL-MCNC: 4.1 MMOL/L — SIGNIFICANT CHANGE UP (ref 3.5–5)
POTASSIUM SERPL-SCNC: 4.1 MMOL/L — SIGNIFICANT CHANGE UP (ref 3.5–5)
SODIUM SERPL-SCNC: 141 MMOL/L — SIGNIFICANT CHANGE UP (ref 135–146)

## 2021-10-26 RX ORDER — HEPARIN SODIUM 5000 [USP'U]/ML
5000 INJECTION INTRAVENOUS; SUBCUTANEOUS
Qty: 0 | Refills: 0 | DISCHARGE
Start: 2021-10-26

## 2021-10-26 RX ORDER — LIDOCAINE 4 G/100G
1 CREAM TOPICAL
Qty: 14 | Refills: 0
Start: 2021-10-26 | End: 2021-11-08

## 2021-10-26 RX ORDER — PANTOPRAZOLE SODIUM 20 MG/1
1 TABLET, DELAYED RELEASE ORAL
Qty: 0 | Refills: 0 | DISCHARGE
Start: 2021-10-26

## 2021-10-26 RX ORDER — IBUPROFEN 200 MG
1 TABLET ORAL
Qty: 0 | Refills: 0 | DISCHARGE
Start: 2021-10-26

## 2021-10-26 RX ORDER — SENNA PLUS 8.6 MG/1
1 TABLET ORAL
Qty: 0 | Refills: 0 | DISCHARGE
Start: 2021-10-26

## 2021-10-26 RX ADMIN — PANTOPRAZOLE SODIUM 40 MILLIGRAM(S): 20 TABLET, DELAYED RELEASE ORAL at 08:12

## 2021-10-26 RX ADMIN — SENNA PLUS 1 TABLET(S): 8.6 TABLET ORAL at 05:52

## 2021-10-26 RX ADMIN — LIDOCAINE 1 PATCH: 4 CREAM TOPICAL at 11:08

## 2021-10-26 RX ADMIN — SENNA PLUS 1 TABLET(S): 8.6 TABLET ORAL at 17:08

## 2021-10-26 RX ADMIN — HEPARIN SODIUM 5000 UNIT(S): 5000 INJECTION INTRAVENOUS; SUBCUTANEOUS at 13:59

## 2021-10-26 RX ADMIN — AMLODIPINE BESYLATE 10 MILLIGRAM(S): 2.5 TABLET ORAL at 05:52

## 2021-10-26 RX ADMIN — HEPARIN SODIUM 5000 UNIT(S): 5000 INJECTION INTRAVENOUS; SUBCUTANEOUS at 05:52

## 2021-10-26 NOTE — DISCHARGE NOTE NURSING/CASE MANAGEMENT/SOCIAL WORK - PATIENT PORTAL LINK FT
You can access the FollowMyHealth Patient Portal offered by [facility] by registering at the following website: [email or web address]. By joining [email or web address]’s FollowMyHealth portal, you will also be able to view your health information using other applications (apps) compatible with our system.

## 2021-10-26 NOTE — DISCHARGE NOTE PROVIDER - CARE PROVIDERS DIRECT ADDRESSES
,DirectAddress_Unknown,DirectAddress_Unknown,[email or web address]

## 2021-10-26 NOTE — DISCHARGE NOTE PROVIDER - PROVIDER TOKENS
PROVIDER:[TOKEN:[[id number]],FOLLOWUP:[1-3 days]],PROVIDER:[TOKEN:[[id number]],FOLLOWUP:[1 week]],PROVIDER:[TOKEN:[[id number]],FOLLOWUP:[1 week]],PROVIDER:[TOKEN:[[id number]],FOLLOWUP:[2 weeks]]

## 2021-10-26 NOTE — DISCHARGE NOTE PROVIDER - NSDCPNSUBOBJ_GEN_ALL_CORE
ICU Vital Signs Last 24 Hrs  T(C): 36.2 (26 Oct 2021 05:20), Max: 37.3 (25 Oct 2021 20:49)  T(F): 97.1 (26 Oct 2021 05:20), Max: 99.2 (25 Oct 2021 20:49)  HR: 65 (26 Oct 2021 05:20) (65 - 73)  BP: 135/69 (26 Oct 2021 05:20) (129/58 - 135/69)  BP(mean): --  ABP: --  ABP(mean): --  RR: 16 (26 Oct 2021 05:20) (16 - 16)  SpO2: --    nad  aaox3  ambulating  [id number]  ctabls  softntn+bs  nocce    #sp hip fracture- nonoperative, ambulating, pain controlled - Pt - needs stairs - planned for SNF today  # ileus resolved - tolerating diet, off ppn, +BM and flatus, outpt cscope - pt aware     stable to dc to SNF today   pt counsellled  time spent 35mins

## 2021-10-26 NOTE — PROGRESS NOTE ADULT - PROVIDER SPECIALTY LIST ADULT
Hospitalist
Surgery
Gastroenterology
Hospitalist
Surgery
Gastroenterology
Hospitalist
Surgery

## 2021-10-26 NOTE — CONSULT NOTE ADULT - SUBJECTIVE AND OBJECTIVE BOX
70 y/o M PMHx HTN and HLD BIBA present to the Ed today s/p fall down 6 concrete steps in front of his house at 11am. Denies any LOC prior to event, fall was mechanical. Was helped up by daughter, but came to ED due to ongoing pain. Found to have left hip nondisplaced fracture. Denies chest pain, dyspnea, nausea/vomiting/abdominal pain, diarrhea, dysuria, recent fevers/chills. (13 Oct 2021 18:34)  Patient was receiving morphine for and developed abdominal distention. Patient feels better (note from 10/18) was not passing gas or having bowel movements since Friday. Currently Patient denies nausea, vomiting, hematemesis, melena, blood in stool, diarrhea, constipation, abdominal pain. patient reports he is passing flatus now and having small bowel movements.  < from: CT Abdomen and Pelvis w/ Oral Cont (10.16.21 @ 23:03) >  PERITONEUM/MESENTERY/BOWEL: Multiple loops of dilated small bowel with air-fluid levels. Apparent transition point in the mid abdomen (series 4 image 252). Subsequently, the colon is distended as well with transition zone in the sigmoid colon (series 4 image 308).  Predominantly sigmoid diverticulosis without evidence of diverticulitis. Redemonstrated nodularity of the appendiceal tip (series 4 image 223), measuring up to 1.7 cm and not significantly changed from prior examination.  IMPRESSION:  Findings consistent with small bowel obstruction. Apparent transition point in the mid abdomen. Concurrent colonic distention with transition zone in the sigmoid colon.    symptoms persisted and AXR with no improvement despite pt reporting + flatus. NST consult called Sat am 10/23, pt NPO with NG suction for several days - d/w hospitalist and PPN started. K had been low.  labs, VS, and d/w medical team daily.  Vital Signs Last 24 Hrs  T(C): 36.2 (26 Oct 2021 05:20), Max: 37.3 (25 Oct 2021 20:49)  T(F): 97.1 (26 Oct 2021 05:20), Max: 99.2 (25 Oct 2021 20:49)  HR: 65 (26 Oct 2021 05:20) (65 - 75)  BP: 135/69 (26 Oct 2021 05:20) (129/58 - 135/69)  BP(mean): --  RR: 16 (26 Oct 2021 05:20) (16 - 16)  Drug Dosing Weight  Height (cm): 165.1 (22 Oct 2021 20:39)  Weight (kg): 106 (13 Oct 2021 20:13)  BMI (kg/m2): 38.9 (22 Oct 2021 20:39)  BSA (m2): 2.11 (22 Oct 2021 20:39)  pt A&O, skin turgor good, sclerae anicteric  periph iv  abd still somewhat distended but mush improved, soft, NT  +BM this morning and 2 yesterday, pt says they are now loose but no pain, cramps, bleeding. etc  no LE edema  PPN d/c now    MEDICATIONS  (STANDING):  amLODIPine   Tablet 10 milliGRAM(s) Oral daily  atorvastatin 40 milliGRAM(s) Oral at bedtime  heparin   Injectable 5000 Unit(s) SubCutaneous every 8 hours  influenza   Vaccine 0.5 milliLiter(s) IntraMuscular once  lidocaine   4% Patch 1 Patch Transdermal daily  pantoprazole    Tablet 40 milliGRAM(s) Oral before breakfast  senna 1 Tablet(s) Oral two times a day                          11.6   11.79 )-----------( 467      ( 25 Oct 2021 06:03 )             35.1   10-25    144  |  109  |  14  ----------------------------<  95  3.9   |  20  |  0.9    Ca    8.8      25 Oct 2021 06:03  Phos  3.7     10-25  Mg     2.3     10-25    TPro  6.3  /  Alb  3.6  /  TBili  0.3  /  DBili  x   /  AST  49<H>  /  ALT  55<H>  /  AlkPhos  102  10-25

## 2021-10-26 NOTE — DISCHARGE NOTE PROVIDER - CARE PROVIDER_API CALL
Joan Arias  Internal Medicine  [address] [place] [address]  Phone: [phone]  Fax: [phone])-  Follow Up Time: 1-3 days    Alex Pena  SURGERY  [address] [place] [address] [address]  [place] [address]  Phone: [phone]  Fax: [phone]  Follow Up Time: 1 week    Yony Way)  Orthopaedic Surgery  [address] [place] [address]  Phone: [phone]  Fax: [phone]  Follow Up Time: 1 week    Josh Carson  Gastroenterology  [address] [place] [address]  Phone: [phone]  Fax: [phone]  Follow Up Time: 2 weeks

## 2021-10-26 NOTE — DISCHARGE NOTE PROVIDER - NSDCMRMEDTOKEN_GEN_ALL_CORE_FT
amLODIPine 10 mg oral tablet: 1 tab(s) orally once a day  atorvastatin 40 mg oral tablet: 1 tab(s) orally once a day (at bedtime)   amLODIPine 10 mg oral tablet: 1 tab(s) orally once a day  atorvastatin 40 mg oral tablet: 1 tab(s) orally once a day (at bedtime)  heparin: 5000 unit(s) subcutaneous 3 times a day  ibuprofen 400 mg oral tablet: 1 tab(s) orally every 6 hours, As needed, Temp greater or equal to 38C (100.4F), Mild Pain (1 - 3)  oxycodone-acetaminophen 5 mg-325 mg oral tablet: 1 tab(s) orally every 4 hours, As needed, Moderate Pain (4 - 6)  pantoprazole 40 mg oral delayed release tablet: 1 tab(s) orally once a day (before a meal)  senna oral tablet: 1 tab(s) orally 2 times a day   amLODIPine 10 mg oral tablet: 1 tab(s) orally once a day  atorvastatin 40 mg oral tablet: 1 tab(s) orally once a day (at bedtime)  heparin: 5000 unit(s) subcutaneous 3 times a day  ibuprofen 400 mg oral tablet: 1 tab(s) orally every 6 hours, As needed, Temp greater or equal to 38C (100.4F), Mild Pain (1 - 3)  lidocaine 4% topical film: Apply topically to affected area once a day left hip  pantoprazole 40 mg oral delayed release tablet: 1 tab(s) orally once a day (before a meal)  senna oral tablet: 1 tab(s) orally 2 times a day

## 2021-10-26 NOTE — DISCHARGE NOTE PROVIDER - NSDCCPCAREPLAN_GEN_ALL_CORE_FT
PRINCIPAL DISCHARGE DIAGNOSIS  Diagnosis: Hip fracture  Assessment and Plan of Treatment: nonoperative - needs to follow up with orthopedics as outpt for repeat imaging - PT to work on stair training at SNF, avoid narcotics due to ileus while in hospital      SECONDARY DISCHARGE DIAGNOSES  Diagnosis: Right shoulder pain  Assessment and Plan of Treatment:     Diagnosis: Abrasion  Assessment and Plan of Treatment:

## 2021-10-26 NOTE — PROGRESS NOTE ADULT - SUBJECTIVE AND OBJECTIVE BOX
71 year old male with a past medical history of GERD, HTN, HLD, presented with chief complaint of fall, developed SBO. Patient states he ate breakfast this morning with no issues. He denies any nausea or vomiting. Patient states he is in no pain and took one bowel movement this morning that was brown in color and watery. Patient also states he is passing gas. Patient denies any fever/chills, fatigue/weakness, cough, chest pain or SOB.     MEDICATIONS  (STANDING):  amLODIPine   Tablet 10 milliGRAM(s) Oral daily  atorvastatin 40 milliGRAM(s) Oral at bedtime  heparin   Injectable 5000 Unit(s) SubCutaneous every 8 hours  influenza   Vaccine 0.5 milliLiter(s) IntraMuscular once  lidocaine   4% Patch 1 Patch Transdermal daily  pantoprazole    Tablet 40 milliGRAM(s) Oral before breakfast  senna 1 Tablet(s) Oral two times a day    MEDICATIONS  (PRN):  hydrOXYzine hydrochloride 50 milliGRAM(s) Oral at bedtime PRN Restlessness  ibuprofen  Tablet. 400 milliGRAM(s) Oral every 6 hours PRN Temp greater or equal to 38C (100.4F), Mild Pain (1 - 3)  ondansetron Injectable 4 milliGRAM(s) IV Push every 8 hours PRN Nausea and/or Vomiting  oxycodone    5 mG/acetaminophen 325 mG 1 Tablet(s) Oral every 4 hours PRN Moderate Pain (4 - 6)  simethicone 80 milliGRAM(s) Chew every 8 hours PRN Gas    Vital Signs Last 24 Hrs  T(C): 36.2 (26 Oct 2021 05:20), Max: 37.3 (25 Oct 2021 20:49)  T(F): 97.1 (26 Oct 2021 05:20), Max: 99.2 (25 Oct 2021 20:49)  HR: 65 (26 Oct 2021 05:20) (65 - 75)  BP: 135/69 (26 Oct 2021 05:20) (129/58 - 135/69)  BP(mean): --  RR: 16 (26 Oct 2021 05:20) (16 - 16)  SpO2: --    Physical Exam:  constitutional: patient is laying in chair, comfortable, in no apparent distress  Respiratory: Equal breath sounds B/L, no rales, rhonchi or wheezes  Cardio: Normal S1, S2, no murmurs   Abdomen: Soft, non-tender, mild distention, normal bowel sounds heard in all four quadrants    Comprehensive Metabolic Panel in AM (10.25.21 @ 06:03)    Sodium, Serum: 144 mmol/L    Potassium, Serum: 3.9 mmol/L    Chloride, Serum: 109 mmol/L    Carbon Dioxide, Serum: 20 mmol/L    Anion Gap, Serum: 15 mmol/L    Blood Urea Nitrogen, Serum: 14 mg/dL    Creatinine, Serum: 0.9 mg/dL    Glucose, Serum: 95 mg/dL    Calcium, Total Serum: 8.8 mg/dL    Protein Total, Serum: 6.3 g/dL    Albumin, Serum: 3.6 g/dL    Bilirubin Total, Serum: 0.3 mg/dL    Alkaline Phosphatase, Serum: 102 U/L    Aspartate Aminotransferase (AST/SGOT): 49 U/L    Alanine Aminotransferase (ALT/SGPT): 55 U/L    eGFR if Non : 87: Interpretative comment    eGFR if : 101 mL/min/1.73M2

## 2021-10-26 NOTE — PROGRESS NOTE ADULT - ASSESSMENT
71 year old male with a past medical history of GERD, HTN, HLD, presented with chief complaint of fall, developed SBO.     Plan:  - Patient is cleared from surgery perspective- tolerated breakfast well this AM  - Can f/u with GI for C-Scope as outpatient.  - Can f/u with Dr. Pena as outpatient for any surgical needs.

## 2021-10-26 NOTE — DISCHARGE NOTE PROVIDER - HOSPITAL COURSE
Patient is a 71 year old Male with past medical history of HTN and HLD BIB Ambulance presented to the Ed s/p fall. Found to have nondisplaced left hip fracture, not needing surgery, course complicated by narcotic induced Ileus Vs SBO. NG tube was placed for nausea and vomiting along with abdominal distention. Nutrition team was involved to provide PPN. NG tube removed and patient tolerated diet. GI recommends outpatient colonoscopy. Surgery recommends outpatient follow up with Dr. Pena. Ortho team was consulted and recommends no surgical intervention. Physiatry recommends Short term rehab in skilled nursing home. Patient to be discharged.     Vital Signs Last 24 Hrs  T(C): 36.2 (26 Oct 2021 05:20), Max: 37.3 (25 Oct 2021 20:49)  T(F): 97.1 (26 Oct 2021 05:20), Max: 99.2 (25 Oct 2021 20:49)  HR: 65 (26 Oct 2021 05:20) (65 - 75)  BP: 135/69 (26 Oct 2021 05:20) (129/58 - 135/69)  RR: 16 (26 Oct 2021 05:20) (16 - 16)

## 2021-10-26 NOTE — CONSULT NOTE ADULT - ASSESSMENT
IMP:  - s/p fall with L hip fracture and R andle sprain  - SBO vs ileus (r/t analgesics)    pt had been NPO for > 5 days, PPN started 10/23 after d/w medical team and surgery, and review of chart  pt clinically improved, and PN now off  pt tolerated clears po yesterday and soft diet for breakfast today

## 2021-11-13 ENCOUNTER — TRANSCRIPTION ENCOUNTER (OUTPATIENT)
Age: 71
End: 2021-11-13

## 2022-05-09 ENCOUNTER — APPOINTMENT (OUTPATIENT)
Dept: SURGERY | Facility: CLINIC | Age: 72
End: 2022-05-09
Payer: MEDICARE

## 2022-05-09 VITALS
DIASTOLIC BLOOD PRESSURE: 85 MMHG | SYSTOLIC BLOOD PRESSURE: 148 MMHG | TEMPERATURE: 97.4 F | WEIGHT: 212 LBS | HEART RATE: 86 BPM | OXYGEN SATURATION: 98 % | HEIGHT: 65 IN | BODY MASS INDEX: 35.32 KG/M2

## 2022-05-09 DIAGNOSIS — Z80.8 FAMILY HISTORY OF MALIGNANT NEOPLASM OF OTHER ORGANS OR SYSTEMS: ICD-10-CM

## 2022-05-09 DIAGNOSIS — Z79.899 OTHER LONG TERM (CURRENT) DRUG THERAPY: ICD-10-CM

## 2022-05-09 DIAGNOSIS — Z82.49 FAMILY HISTORY OF ISCHEMIC HEART DISEASE AND OTHER DISEASES OF THE CIRCULATORY SYSTEM: ICD-10-CM

## 2022-05-09 DIAGNOSIS — F17.200 NICOTINE DEPENDENCE, UNSPECIFIED, UNCOMPLICATED: ICD-10-CM

## 2022-05-09 DIAGNOSIS — Z86.79 PERSONAL HISTORY OF OTHER DISEASES OF THE CIRCULATORY SYSTEM: ICD-10-CM

## 2022-05-09 DIAGNOSIS — Z86.39 PERSONAL HISTORY OF OTHER ENDOCRINE, NUTRITIONAL AND METABOLIC DISEASE: ICD-10-CM

## 2022-05-09 DIAGNOSIS — Z72.89 OTHER PROBLEMS RELATED TO LIFESTYLE: ICD-10-CM

## 2022-05-09 PROCEDURE — 99213 OFFICE O/P EST LOW 20 MIN: CPT

## 2022-05-10 RX ORDER — ATORVASTATIN CALCIUM 80 MG/1
TABLET, FILM COATED ORAL
Refills: 0 | Status: ACTIVE | COMMUNITY

## 2022-05-10 RX ORDER — CITALOPRAM HYDROBROMIDE 20 MG/1
20 TABLET, FILM COATED ORAL
Qty: 90 | Refills: 0 | Status: ACTIVE | COMMUNITY
Start: 2021-12-27

## 2022-05-10 RX ORDER — AMLODIPINE BESYLATE 10 MG/1
10 TABLET ORAL
Refills: 0 | Status: ACTIVE | COMMUNITY

## 2022-05-10 RX ORDER — IBUPROFEN 600 MG/1
600 TABLET, FILM COATED ORAL
Qty: 30 | Refills: 0 | Status: ACTIVE | COMMUNITY
Start: 2021-11-09

## 2022-05-10 RX ORDER — NYSTATIN AND TRIAMCINOLONE ACETONIDE 100000; 1 MG/G; MG/G
100000-0.1 CREAM TOPICAL
Qty: 45 | Refills: 0 | Status: ACTIVE | COMMUNITY
Start: 2022-02-14

## 2022-05-10 RX ORDER — LEVOFLOXACIN 500 MG/1
500 TABLET, FILM COATED ORAL
Qty: 10 | Refills: 0 | Status: ACTIVE | COMMUNITY
Start: 2021-11-15

## 2022-05-10 RX ORDER — CITALOPRAM HYDROBROMIDE 10 MG/1
10 TABLET, FILM COATED ORAL
Refills: 0 | Status: ACTIVE | COMMUNITY

## 2022-05-10 RX ORDER — COVID-19 MOLECULAR TEST ASSAY
KIT MISCELLANEOUS
Qty: 8 | Refills: 0 | Status: ACTIVE | COMMUNITY
Start: 2022-04-19

## 2022-05-10 RX ORDER — PANTOPRAZOLE 40 MG/1
40 TABLET, DELAYED RELEASE ORAL
Qty: 30 | Refills: 0 | Status: ACTIVE | COMMUNITY
Start: 2021-11-09

## 2022-05-10 RX ORDER — HALOBETASOL PROPIONATE 0.5 MG/G
0.05 OINTMENT TOPICAL
Qty: 45 | Refills: 0 | Status: ACTIVE | COMMUNITY
Start: 2022-03-14

## 2022-05-10 NOTE — ASSESSMENT
[FreeTextEntry1] : 71M with anal pain possible fissure\par \par I discussed the pathology of anal fissure with the patient.  Although we were unable to visualize a fissure he has typical symptoms. We discussed starting a fiber supplement, increasing water intake and using nifedipine cream.  We will see the patient back in 2 months.\par

## 2022-05-10 NOTE — HISTORY OF PRESENT ILLNESS
[FreeTextEntry1] : 71 year M presents for evaluation for anal pain \par PMH:HTn, HLD, HIgh Triglycerides \par PSH:removal of foreign object endoscopically \par \par Initially notice symptoms, 1 1/2 years ago after hip surgery. Reports 2 episodes of flare up in this time. Reports severe anal pain and spasm. Recently went for exam with MD Mcfadden who referred him here. \par Denies Bleeding or itching. Pt denies fever, chills, nausea, vomiting, abdominal pain, constipation, diarrhea, blood in stool, or unexpected weight loss. \par \par BH: 1-2  Times per day, Denies: constipation with bowel regime of Senna, MiraLAX and docusate sodium daily \par Diet: Reports : Adequate intake of fiber and water\par Last colonoscopy: Never had, does yearly Cologuard Results Normal \par ASA/NSAIDS use NONE  \par \par Pt denies a family history of colon cancer, rectal cancer, or inflammatory bowel disease.\par \par clarified with Pt, NKDA \par

## 2022-05-10 NOTE — PHYSICAL EXAM
[Abdomen Masses] : No abdominal masses [Abdomen Tenderness] : ~T No ~M abdominal tenderness [No HSM] : no hepatosplenomegaly [None] : no anal fissures seen [Excoriation] : no perianal excoriation [Fistula] : no fistulas [Wart] : no warts [Ulcer ___ cm] : no ulcers [Pilonidal Cyst] : no pilonidal cysts [Respiratory Effort] : normal respiratory effort [Normal Rate and Rhythm] : normal rate and rhythm [de-identified] : external examination shows a small posterior skin tag.  No obvious fissure put there is pain with palpation in the posterior midline [de-identified] : FELICITAS and anoscopy not performed due to pain [de-identified] : awake, alert and in no acute distress

## 2022-07-18 ENCOUNTER — APPOINTMENT (OUTPATIENT)
Dept: SURGERY | Facility: CLINIC | Age: 72
End: 2022-07-18

## 2022-07-18 VITALS
BODY MASS INDEX: 36.32 KG/M2 | DIASTOLIC BLOOD PRESSURE: 76 MMHG | HEIGHT: 65 IN | HEART RATE: 97 BPM | WEIGHT: 218 LBS | SYSTOLIC BLOOD PRESSURE: 148 MMHG | TEMPERATURE: 97.2 F | OXYGEN SATURATION: 98 %

## 2022-07-18 DIAGNOSIS — K60.2 ANAL FISSURE, UNSPECIFIED: ICD-10-CM

## 2022-07-18 PROCEDURE — 99213 OFFICE O/P EST LOW 20 MIN: CPT

## 2022-07-18 NOTE — ASSESSMENT
[FreeTextEntry1] : 71M with anal fissure\par \par Patient's symptoms have resolved. He will DC nifedipine and continue with a fiber supplement and increased water intake. We will see the patient back as needed.\par

## 2022-07-18 NOTE — PHYSICAL EXAM
[No HSM] : no hepatosplenomegaly [Respiratory Effort] : normal respiratory effort [Normal Rate and Rhythm] : normal rate and rhythm [Abdomen Masses] : No abdominal masses [Abdomen Tenderness] : ~T No ~M abdominal tenderness [de-identified] : awake, alert and in no acute distress

## 2022-07-18 NOTE — HISTORY OF PRESENT ILLNESS
[FreeTextEntry1] : 71 year M presents for evaluation for anal pain \par PMH:HTN, HLD, HIgh Triglycerides \par PSH:removal of foreign object endoscopically \par \par Previously seen for symptoms, lasting 1 1/2 years ago after hip surgery. Reported 2 episodes of flare up in this time. Reports severe anal pain and spasm. Recently went for exam with MD Mcfadden who referred him here. \par At this time pt reports complete resolution of symptoms. Pt reports daily soft Bm's, with daily MiraLAX use. Pt denies, pain,  fever, chills, nausea, vomiting, abdominal pain, constipation, diarrhea, blood in stool, or unexpected weight loss. \par \par Last colonoscopy: Never had, does yearly Cologuard Results Normal \par ASA/NSAIDS use NONE  \par \par Pt denies a family history of colon cancer, rectal cancer, or inflammatory bowel disease.\par \par \par

## 2022-11-15 NOTE — PATIENT PROFILE ADULT - HARM RISK FACTORS
[de-identified] : right knee:\par large effusion \par 0-115\par 5/5\par NIV\par good pulses \par ligaments stable [All Views] : anteroposterior, lateral, skyline, and anteroposterior standing [Components well fixed, in good position] : Components well fixed, in good position [FreeTextEntry9] : well fixed semi constrained cemented TKA -  good alighnment\par well fixed cemented semi-constrained TKA - good postion - no sign of loosening  or obv osteolysis  no

## 2023-01-02 ENCOUNTER — EMERGENCY (EMERGENCY)
Facility: HOSPITAL | Age: 73
LOS: 0 days | Discharge: HOME | End: 2023-01-02
Attending: STUDENT IN AN ORGANIZED HEALTH CARE EDUCATION/TRAINING PROGRAM | Admitting: STUDENT IN AN ORGANIZED HEALTH CARE EDUCATION/TRAINING PROGRAM
Payer: MEDICARE

## 2023-01-02 VITALS
DIASTOLIC BLOOD PRESSURE: 82 MMHG | SYSTOLIC BLOOD PRESSURE: 170 MMHG | RESPIRATION RATE: 18 BRPM | WEIGHT: 227.96 LBS | OXYGEN SATURATION: 97 % | HEIGHT: 66 IN | HEART RATE: 71 BPM | TEMPERATURE: 97 F

## 2023-01-02 DIAGNOSIS — Z79.01 LONG TERM (CURRENT) USE OF ANTICOAGULANTS: ICD-10-CM

## 2023-01-02 DIAGNOSIS — S09.90XA UNSPECIFIED INJURY OF HEAD, INITIAL ENCOUNTER: ICD-10-CM
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
## 2023-01-02 DIAGNOSIS — E78.00 PURE HYPERCHOLESTEROLEMIA, UNSPECIFIED: ICD-10-CM

## 2023-01-02 DIAGNOSIS — R07.81 PLEURODYNIA: ICD-10-CM

## 2023-01-02 DIAGNOSIS — I10 ESSENTIAL (PRIMARY) HYPERTENSION: ICD-10-CM

## 2023-01-02 DIAGNOSIS — S22.42XA MULTIPLE FRACTURES OF RIBS, LEFT SIDE, INITIAL ENCOUNTER FOR CLOSED FRACTURE: ICD-10-CM

## 2023-01-02 DIAGNOSIS — F17.200 NICOTINE DEPENDENCE, UNSPECIFIED, UNCOMPLICATED: ICD-10-CM

## 2023-01-02 DIAGNOSIS — K21.9 GASTRO-ESOPHAGEAL REFLUX DISEASE WITHOUT ESOPHAGITIS: ICD-10-CM
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
## 2023-01-02 LAB
ALBUMIN SERPL ELPH-MCNC: 4.7 G/DL — SIGNIFICANT CHANGE UP (ref 3.5–5.2)
ALP SERPL-CCNC: 66 U/L — SIGNIFICANT CHANGE UP (ref 30–115)
ALT FLD-CCNC: 23 U/L — SIGNIFICANT CHANGE UP (ref 0–41)
ANION GAP SERPL CALC-SCNC: 11 MMOL/L — SIGNIFICANT CHANGE UP (ref 7–14)
AST SERPL-CCNC: 49 U/L — HIGH (ref 0–41)
BASOPHILS # BLD AUTO: 0.07 K/UL — SIGNIFICANT CHANGE UP (ref 0–0.2)
BASOPHILS NFR BLD AUTO: 0.8 % — SIGNIFICANT CHANGE UP (ref 0–1)
BILIRUB SERPL-MCNC: 0.5 MG/DL — SIGNIFICANT CHANGE UP (ref 0.2–1.2)
BUN SERPL-MCNC: 26 MG/DL — HIGH (ref 10–20)
CALCIUM SERPL-MCNC: 9.6 MG/DL — SIGNIFICANT CHANGE UP (ref 8.4–10.5)
CHLORIDE SERPL-SCNC: 101 MMOL/L — SIGNIFICANT CHANGE UP (ref 98–110)
CO2 SERPL-SCNC: 25 MMOL/L — SIGNIFICANT CHANGE UP (ref 17–32)
CREAT SERPL-MCNC: 1.2 MG/DL — SIGNIFICANT CHANGE UP (ref 0.7–1.5)
EGFR: 65 ML/MIN/1.73M2 — SIGNIFICANT CHANGE UP
EOSINOPHIL # BLD AUTO: 0.27 K/UL — SIGNIFICANT CHANGE UP (ref 0–0.7)
EOSINOPHIL NFR BLD AUTO: 3 % — SIGNIFICANT CHANGE UP (ref 0–8)
GLUCOSE SERPL-MCNC: 105 MG/DL — HIGH (ref 70–99)
HCT VFR BLD CALC: 43.6 % — SIGNIFICANT CHANGE UP (ref 42–52)
HGB BLD-MCNC: 14.6 G/DL — SIGNIFICANT CHANGE UP (ref 14–18)
IMM GRANULOCYTES NFR BLD AUTO: 0.3 % — SIGNIFICANT CHANGE UP (ref 0.1–0.3)
LYMPHOCYTES # BLD AUTO: 1.84 K/UL — SIGNIFICANT CHANGE UP (ref 1.2–3.4)
LYMPHOCYTES # BLD AUTO: 20.4 % — LOW (ref 20.5–51.1)
MCHC RBC-ENTMCNC: 30.1 PG — SIGNIFICANT CHANGE UP (ref 27–31)
MCHC RBC-ENTMCNC: 33.5 G/DL — SIGNIFICANT CHANGE UP (ref 32–37)
MCV RBC AUTO: 89.9 FL — SIGNIFICANT CHANGE UP (ref 80–94)
MONOCYTES # BLD AUTO: 0.59 K/UL — SIGNIFICANT CHANGE UP (ref 0.1–0.6)
MONOCYTES NFR BLD AUTO: 6.5 % — SIGNIFICANT CHANGE UP (ref 1.7–9.3)
NEUTROPHILS # BLD AUTO: 6.21 K/UL — SIGNIFICANT CHANGE UP (ref 1.4–6.5)
NEUTROPHILS NFR BLD AUTO: 69 % — SIGNIFICANT CHANGE UP (ref 42.2–75.2)
NRBC # BLD: 0 /100 WBCS — SIGNIFICANT CHANGE UP (ref 0–0)
PLATELET # BLD AUTO: 258 K/UL — SIGNIFICANT CHANGE UP (ref 130–400)
POTASSIUM SERPL-MCNC: 6.2 MMOL/L — CRITICAL HIGH (ref 3.5–5)
POTASSIUM SERPL-SCNC: 6.2 MMOL/L — CRITICAL HIGH (ref 3.5–5)
PROT SERPL-MCNC: 8.7 G/DL — HIGH (ref 6–8)
RBC # BLD: 4.85 M/UL — SIGNIFICANT CHANGE UP (ref 4.7–6.1)
RBC # FLD: 12.6 % — SIGNIFICANT CHANGE UP (ref 11.5–14.5)
SODIUM SERPL-SCNC: 137 MMOL/L — SIGNIFICANT CHANGE UP (ref 135–146)
WBC # BLD: 9.01 K/UL — SIGNIFICANT CHANGE UP (ref 4.8–10.8)
WBC # FLD AUTO: 9.01 K/UL — SIGNIFICANT CHANGE UP (ref 4.8–10.8)

## 2023-01-02 PROCEDURE — 70450 CT HEAD/BRAIN W/O DYE: CPT | Mod: 26,MA

## 2023-01-02 PROCEDURE — 72125 CT NECK SPINE W/O DYE: CPT | Mod: 26,MA

## 2023-01-02 PROCEDURE — 74177 CT ABD & PELVIS W/CONTRAST: CPT | Mod: 26,MA

## 2023-01-02 PROCEDURE — 71260 CT THORAX DX C+: CPT | Mod: 26,MA

## 2023-01-02 PROCEDURE — 71045 X-RAY EXAM CHEST 1 VIEW: CPT | Mod: 26

## 2023-01-02 PROCEDURE — 99284 EMERGENCY DEPT VISIT MOD MDM: CPT | Mod: FS

## 2023-01-02 NOTE — ED PROVIDER NOTE - OBJECTIVE STATEMENT
72-year-old male, past medical history hypertension and hyperlipidemia, presents emergency department for right rib pain.  Patient fell 4 days ago hit head and complaining of right lower rib pain mild aching no radiation.  Denies LOC, chest pain, shortness of breath, nausea vomiting

## 2023-01-02 NOTE — ED PROVIDER NOTE - PHYSICAL EXAMINATION
Physical Exam    Vital Signs: I have reviewed the initial vital signs.  Constitutional: appears stated age, no acute distress  Eyes: Conjunctiva pink, Sclera clear,  Cardiovascular: S1 and S2, regular rate, regular rhythm, well-perfused extremities, radial pulses equal and 2+, pedal pulses 2+ and equal  Respiratory: unlabored respiratory effort, clear to auscultation bilaterally no wheezing, rales and rhonchi  Gastrointestinal: soft, non-tender abdomen, no pulsatile mass, normal bowl sounds  Musculoskeletal: supple neck, no lower extremity edema, no midline tenderness, ttp lower right ribs  Integumentary: warm, dry, no rash  Neurologic: awake, alert, nvi

## 2023-01-02 NOTE — ED PROVIDER NOTE - NSFOLLOWUPINSTRUCTIONS_ED_ALL_ED_FT
Rib Fracture(s)    A rib fracture is a break or crack in one of the bones of the ribs. The ribs are a group of long, curved bones that wrap around your chest and attach to your spine. A broken or cracked rib is often painful, but most do not cause other problems and heal on their own over time. Symptoms include pain when you breath or cough or pain when pressing over the area. Breathing exercises will help keep your lungs inflated and prevent lung collapse or infection.    SEEK IMMEDIATE MEDICAL CARE IF YOU HAVE ANY OF THE FOLLOWING SYMPTOMS: fever, shortness of breath, coughing up blood, abdominal pain, nausea or vomiting, pain not controlled with medications.    Closed Head Injury    A closed head injury is an injury to your head that may or may not involve a traumatic brain injury (TBI). Symptoms of TBI can be short or long lasting and include headache, dizziness, interference with memory or speech, fatigue, confusion, changes in sleep, mood changes, nausea, depression/anxiety, and dulling of senses. Make sure to obtain proper rest which includes getting plenty of sleep, avoiding excessive visual stimulation, and avoiding activities that may cause physical or mental stress. Avoid any situation where there is potential for another head injury, including sports.    SEEK IMMEDIATE MEDICAL CARE IF YOU HAVE ANY OF THE FOLLOWING SYMPTOMS: unusual drowsiness, vomiting, severe dizziness, seizures, lightheadedness, muscular weakness, different pupil sizes, visual changes, or clear or bloody discharge from your ears or nose.    Fall Prevention    WHAT YOU NEED TO KNOW:    What is fall prevention? Fall prevention includes ways to make your home and other areas safer. It also includes ways you can move more carefully to prevent a fall.    What increases my risk for falls?   - Lack of vitamin D  - Not getting enough sleep each night  - Trouble walking or keeping your balance, or foot problems  - Health conditions that cause changes in your blood pressure, vision, or muscle strength and coordination  - Medicines that make you dizzy, weak, or sleepy  - Problems seeing clearly  - Shoes that have high heels or are not supportive  - Tripping hazards, such as items left on the floor, no handrails on the stairs, or broken steps    How can I help protect myself from falls?     Stand or sit up slowly. This may help you keep your balance and prevent falls. If you need to get up during the night, sit up first. Be sure you are fully awake before you stand. Turn on the light before you start walking. Go slowly in case you are still sleepy. Make sure you will not trip over any pets sleeping in the bedroom.    Use assistive devices as directed. Your healthcare provider may suggest that you use a cane or walker to help you keep your balance. You may need to have grab bars put in your bathroom near the toilet or in the shower.    Wear shoes that fit well and have soles that . Wear shoes both inside and outside. Use slippers with good . Do not wear shoes with high heels.    Wear a personal alarm. This is a device that allows you to call 911 if you fall and need help. Ask your healthcare provider for more information.    Stay active. Exercise can help strengthen your muscles and improve your balance. Your healthcare provider may recommend water aerobics or walking. He or she may also recommend physical therapy to improve your coordination. Never start an exercise program without talking to your healthcare provider first.     Manage medical conditions. Keep all appointments with your healthcare providers. Visit your eye doctor as directed.    How can I make my home safer?     Add items to prevent falls in the bathroom. Put nonslip strips on your bath or shower floor to prevent you from slipping. Use a bath mat if you do not have carpet in the bathroom. This will prevent you from falling when you step out of the bath or shower. Use a shower seat so you do not need to stand while you shower. Sit on the toilet or a chair in your bathroom to dry yourself and put on clothing. This will prevent you from losing your balance from drying or dressing yourself while you are standing.     Keep paths clear. Remove books, shoes, and other objects from walkways and stairs. Place cords for telephones and lamps out of the way so that you do not need to walk over them. Tape them down if you cannot move them. Remove small rugs. If you cannot remove a rug, secure it with double-sided tape. This will prevent you from tripping.     Install bright lights in your home. Use night lights to help light paths to the bathroom or kitchen. Always turn on the light before you start walking.    Keep items you use often on shelves within reach. Do not use a step stool to help you reach an item.    Paint or place reflective tape on the edges of your stairs. This will help you see the stairs better.    Call 911 or have someone else call if:   - You have fallen and are unconscious.  - You have fallen and cannot move part of your body.    Contact your healthcare provider if:   - You have fallen and have pain or a headache.  - You have questions or concerns about your condition or care.

## 2023-01-02 NOTE — ED PROVIDER NOTE - CONSIDERATION OF ADMISSION OBSERVATION
pt only with 2 rib fx's and pulling well on incentive spirometry so lower risk for atelectasis or pneumonia, furthermore pain well controlled, does not require continuous pulse ox monitoring or admission for pain control Consideration of Admission/Observation

## 2023-01-02 NOTE — ED ADULT TRIAGE NOTE - CHIEF COMPLAINT QUOTE
pt tripped wed night over a stool, put hands out and landed on right flank, hit back of head on stove, NO LOC, complains of pain to the right flank.

## 2023-01-02 NOTE — ED PROVIDER NOTE - NS ED ATTENDING STATEMENT MOD
This was a shared visit with the LUBA. I reviewed and verified the documentation and independently performed the documented:

## 2023-01-02 NOTE — ED PROVIDER NOTE - ATTENDING APP SHARED VISIT CONTRIBUTION OF CARE
73 yo M with hx of HTN, HLD, GERD who presents with R sided rip pain radiating down abd x 6 days after mechanical fall. Pt says he was drinking etoh and tripped over his dog (who is blind), causing him to fall down and hit R rib on step stool and head on ground. No LOC. No preceding cp, sob, dizziness prior to fall. Has been taking advil and ASA for pain but pain still persistent so came to ED. No fever, headache, neck pain, cp, sob, hematuria. Normally takes ASA 2x/wk, no daily blood thinner use. No hx of kidney stones.    PMD Dr. Joan Arias    CONSTITUTIONAL: well developed, well nourished, no acute distress  TRAUMA: ABC intact, GCS 15  HEAD: normocephalic, atraumatic  EYES: PERRL at 3 mm, EOMI, no raccoon eyes  ENT: no poole sign, moist mucous membranes  NECK: cno midline tenderness, no stepoffs, no deformity  CV: regular rate, regular rhythm, equal distal pulses  RESP: lungs clear to auscultation bilaterally, normal work of breathing, symmetric rise and fall of chest   CHEST: no anterior chest wall tenderness, no crepitus, no clavicular deformity/tenting, R lower rib tenderness to palpation, no ecchymosis  ABD: soft, nondistended, nontender, no rebound, no guarding, no rigidity, no pelvic instability  BACK: no midline T/L/S-spine tenderness, no stepoffs, no deformity, no CVA tenderness  EXT: no obvious deformity, no tenderness of extremities, full ROM, cap refill < 2 seconds  SKIN: no rashes, no lacerations, no abrasions  NEURO: A&Ox3, motor strength 5/5 in all extremities, sensation grossly intact throughout, no focal neurological deficits, GCS 15  PSYCH: normal mood, appropriate affect    Pt here with persistent R sided rib pain after mechanical fall. No preceding cardiac sx. No daily blood thinners. Vitals wnl in ED. No neuro deficits or external signs of trauma however has mild R rib tenderness to palpation. Given age and risk for ICH, will obtain labs and pan scan r/o ICH, fx, ptx, intra thoracic/abd injury. Pt declined pain meds.

## 2023-01-02 NOTE — ED PROVIDER NOTE - CARE PLAN
Principal Discharge DX:	Fracture of two ribs on left side  Secondary Diagnosis:	Closed head injury  Secondary Diagnosis:	Fall   1

## 2023-01-02 NOTE — ED PROVIDER NOTE - PATIENT PORTAL LINK FT
You can access the FollowMyHealth Patient Portal offered by [facility] by registering at the following website: [email or web address]. By joining [employer] [facility] [employer]’s FollowMyHealth portal, you will also be able to view your health information using other applications (apps) compatible with our system.

## 2023-01-31 ENCOUNTER — INPATIENT (INPATIENT)
Facility: HOSPITAL | Age: 73
LOS: 1 days | Discharge: HOME | End: 2023-02-02
Attending: INTERNAL MEDICINE | Admitting: INTERNAL MEDICINE
Payer: MEDICARE

## 2023-01-31 VITALS
HEART RATE: 87 BPM | SYSTOLIC BLOOD PRESSURE: 165 MMHG | OXYGEN SATURATION: 97 % | RESPIRATION RATE: 18 BRPM | TEMPERATURE: 96 F | DIASTOLIC BLOOD PRESSURE: 89 MMHG | HEIGHT: 66 IN | WEIGHT: 225.97 LBS

## 2023-01-31 LAB
ALBUMIN SERPL ELPH-MCNC: 4.9 G/DL — SIGNIFICANT CHANGE UP (ref 3.5–5.2)
ALP SERPL-CCNC: 94 U/L — SIGNIFICANT CHANGE UP (ref 30–115)
ALT FLD-CCNC: 19 U/L — SIGNIFICANT CHANGE UP (ref 0–41)
ANION GAP SERPL CALC-SCNC: 12 MMOL/L — SIGNIFICANT CHANGE UP (ref 7–14)
AST SERPL-CCNC: 19 U/L — SIGNIFICANT CHANGE UP (ref 0–41)
BASOPHILS # BLD AUTO: 0.05 K/UL — SIGNIFICANT CHANGE UP (ref 0–0.2)
BASOPHILS NFR BLD AUTO: 0.7 % — SIGNIFICANT CHANGE UP (ref 0–1)
BILIRUB SERPL-MCNC: 0.8 MG/DL — SIGNIFICANT CHANGE UP (ref 0.2–1.2)
BUN SERPL-MCNC: 22 MG/DL — HIGH (ref 10–20)
CALCIUM SERPL-MCNC: 9.6 MG/DL — SIGNIFICANT CHANGE UP (ref 8.4–10.5)
CHLORIDE SERPL-SCNC: 101 MMOL/L — SIGNIFICANT CHANGE UP (ref 98–110)
CO2 SERPL-SCNC: 25 MMOL/L — SIGNIFICANT CHANGE UP (ref 17–32)
CREAT SERPL-MCNC: 1.3 MG/DL — SIGNIFICANT CHANGE UP (ref 0.7–1.5)
EGFR: 59 ML/MIN/1.73M2 — LOW
EOSINOPHIL # BLD AUTO: 0.13 K/UL — SIGNIFICANT CHANGE UP (ref 0–0.7)
EOSINOPHIL NFR BLD AUTO: 1.7 % — SIGNIFICANT CHANGE UP (ref 0–8)
GLUCOSE SERPL-MCNC: 115 MG/DL — HIGH (ref 70–99)
HCT VFR BLD CALC: 43.2 % — SIGNIFICANT CHANGE UP (ref 42–52)
HGB BLD-MCNC: 14.4 G/DL — SIGNIFICANT CHANGE UP (ref 14–18)
IMM GRANULOCYTES NFR BLD AUTO: 0.3 % — SIGNIFICANT CHANGE UP (ref 0.1–0.3)
LYMPHOCYTES # BLD AUTO: 1.39 K/UL — SIGNIFICANT CHANGE UP (ref 1.2–3.4)
LYMPHOCYTES # BLD AUTO: 18.3 % — LOW (ref 20.5–51.1)
MAGNESIUM SERPL-MCNC: 2 MG/DL — SIGNIFICANT CHANGE UP (ref 1.8–2.4)
MCHC RBC-ENTMCNC: 29.7 PG — SIGNIFICANT CHANGE UP (ref 27–31)
MCHC RBC-ENTMCNC: 33.3 G/DL — SIGNIFICANT CHANGE UP (ref 32–37)
MCV RBC AUTO: 89.1 FL — SIGNIFICANT CHANGE UP (ref 80–94)
MONOCYTES # BLD AUTO: 0.64 K/UL — HIGH (ref 0.1–0.6)
MONOCYTES NFR BLD AUTO: 8.4 % — SIGNIFICANT CHANGE UP (ref 1.7–9.3)
NEUTROPHILS # BLD AUTO: 5.38 K/UL — SIGNIFICANT CHANGE UP (ref 1.4–6.5)
NEUTROPHILS NFR BLD AUTO: 70.6 % — SIGNIFICANT CHANGE UP (ref 42.2–75.2)
NRBC # BLD: 0 /100 WBCS — SIGNIFICANT CHANGE UP (ref 0–0)
PLATELET # BLD AUTO: 232 K/UL — SIGNIFICANT CHANGE UP (ref 130–400)
POTASSIUM SERPL-MCNC: 4.1 MMOL/L — SIGNIFICANT CHANGE UP (ref 3.5–5)
POTASSIUM SERPL-SCNC: 4.1 MMOL/L — SIGNIFICANT CHANGE UP (ref 3.5–5)
PROT SERPL-MCNC: 8.1 G/DL — HIGH (ref 6–8)
RBC # BLD: 4.85 M/UL — SIGNIFICANT CHANGE UP (ref 4.7–6.1)
RBC # FLD: 13 % — SIGNIFICANT CHANGE UP (ref 11.5–14.5)
SARS-COV-2 RNA SPEC QL NAA+PROBE: SIGNIFICANT CHANGE UP
SODIUM SERPL-SCNC: 138 MMOL/L — SIGNIFICANT CHANGE UP (ref 135–146)
WBC # BLD: 7.61 K/UL — SIGNIFICANT CHANGE UP (ref 4.8–10.8)
WBC # FLD AUTO: 7.61 K/UL — SIGNIFICANT CHANGE UP (ref 4.8–10.8)

## 2023-01-31 PROCEDURE — 70496 CT ANGIOGRAPHY HEAD: CPT | Mod: 26,MA

## 2023-01-31 PROCEDURE — 99285 EMERGENCY DEPT VISIT HI MDM: CPT | Mod: FS

## 2023-01-31 PROCEDURE — 73562 X-RAY EXAM OF KNEE 3: CPT | Mod: 26,RT

## 2023-01-31 PROCEDURE — 70450 CT HEAD/BRAIN W/O DYE: CPT | Mod: 26,MA,59

## 2023-01-31 PROCEDURE — 99222 1ST HOSP IP/OBS MODERATE 55: CPT

## 2023-01-31 PROCEDURE — 73130 X-RAY EXAM OF HAND: CPT | Mod: 26,LT

## 2023-01-31 PROCEDURE — 71045 X-RAY EXAM CHEST 1 VIEW: CPT | Mod: 26

## 2023-01-31 PROCEDURE — 72170 X-RAY EXAM OF PELVIS: CPT | Mod: 26

## 2023-01-31 PROCEDURE — 70498 CT ANGIOGRAPHY NECK: CPT | Mod: 26,MA

## 2023-01-31 PROCEDURE — 93010 ELECTROCARDIOGRAM REPORT: CPT

## 2023-01-31 RX ORDER — AMLODIPINE BESYLATE 2.5 MG/1
10 TABLET ORAL DAILY
Refills: 0 | Status: DISCONTINUED | OUTPATIENT
Start: 2023-01-31 | End: 2023-02-02

## 2023-01-31 RX ORDER — ATORVASTATIN CALCIUM 80 MG/1
20 TABLET, FILM COATED ORAL AT BEDTIME
Refills: 0 | Status: DISCONTINUED | OUTPATIENT
Start: 2023-01-31 | End: 2023-01-31

## 2023-01-31 RX ORDER — CITALOPRAM 10 MG/1
10 TABLET, FILM COATED ORAL DAILY
Refills: 0 | Status: DISCONTINUED | OUTPATIENT
Start: 2023-01-31 | End: 2023-02-02

## 2023-01-31 RX ORDER — ONDANSETRON 8 MG/1
4 TABLET, FILM COATED ORAL EVERY 8 HOURS
Refills: 0 | Status: DISCONTINUED | OUTPATIENT
Start: 2023-01-31 | End: 2023-02-02

## 2023-01-31 RX ORDER — ACETAMINOPHEN 500 MG
650 TABLET ORAL EVERY 6 HOURS
Refills: 0 | Status: DISCONTINUED | OUTPATIENT
Start: 2023-01-31 | End: 2023-02-02

## 2023-01-31 RX ORDER — FOLIC ACID 0.8 MG
1 TABLET ORAL DAILY
Refills: 0 | Status: DISCONTINUED | OUTPATIENT
Start: 2023-01-31 | End: 2023-02-02

## 2023-01-31 RX ORDER — THIAMINE MONONITRATE (VIT B1) 100 MG
100 TABLET ORAL DAILY
Refills: 0 | Status: DISCONTINUED | OUTPATIENT
Start: 2023-01-31 | End: 2023-02-02

## 2023-01-31 RX ORDER — LANOLIN ALCOHOL/MO/W.PET/CERES
3 CREAM (GRAM) TOPICAL AT BEDTIME
Refills: 0 | Status: DISCONTINUED | OUTPATIENT
Start: 2023-01-31 | End: 2023-02-02

## 2023-01-31 RX ORDER — ATORVASTATIN CALCIUM 80 MG/1
40 TABLET, FILM COATED ORAL DAILY
Refills: 0 | Status: DISCONTINUED | OUTPATIENT
Start: 2023-01-31 | End: 2023-02-02

## 2023-01-31 RX ORDER — SENNA PLUS 8.6 MG/1
2 TABLET ORAL AT BEDTIME
Refills: 0 | Status: DISCONTINUED | OUTPATIENT
Start: 2023-01-31 | End: 2023-02-02

## 2023-01-31 RX ORDER — ENOXAPARIN SODIUM 100 MG/ML
40 INJECTION SUBCUTANEOUS EVERY 24 HOURS
Refills: 0 | Status: DISCONTINUED | OUTPATIENT
Start: 2023-01-31 | End: 2023-02-02

## 2023-01-31 RX ORDER — ASPIRIN/CALCIUM CARB/MAGNESIUM 324 MG
81 TABLET ORAL DAILY
Refills: 0 | Status: DISCONTINUED | OUTPATIENT
Start: 2023-01-31 | End: 2023-02-02

## 2023-01-31 RX ADMIN — Medication 650 MILLIGRAM(S): at 23:00

## 2023-01-31 RX ADMIN — Medication 81 MILLIGRAM(S): at 18:56

## 2023-01-31 RX ADMIN — Medication 3 MILLIGRAM(S): at 22:08

## 2023-01-31 RX ADMIN — Medication 650 MILLIGRAM(S): at 22:10

## 2023-01-31 RX ADMIN — ENOXAPARIN SODIUM 40 MILLIGRAM(S): 100 INJECTION SUBCUTANEOUS at 18:56

## 2023-01-31 RX ADMIN — SENNA PLUS 2 TABLET(S): 8.6 TABLET ORAL at 22:08

## 2023-01-31 NOTE — H&P ADULT - NSHPLABSRESULTS_GEN_ALL_CORE
14.4   7.61  )-----------( 232      ( 31 Jan 2023 12:39 )             43.2       01-31    138  |  101  |  22<H>  ----------------------------<  115<H>  4.1   |  25  |  1.3    Ca    9.6      31 Jan 2023 12:39  Mg     2.0     01-31    TPro  8.1<H>  /  Alb  4.9  /  TBili  0.8  /  DBili  x   /  AST  19  /  ALT  19  /  AlkPhos  94  01-31          Magnesium, Serum: 2.0 mg/dL (01-31-23 @ 12:39)        RADIOLOGY:  < from: CT Angio Head w/ IV Cont (01.31.23 @ 15:48) >    IMPRESSION:    1.  No evidence of major vascular stenosis or occlusion.    2.  Mild stenoses of the posterior cerebral arteries.    --- End of Report ---      LIS GONZALEZ MD; Attending Radiologist  This document has been electronically signed. Jan 31 2023  4:04PM< from: CT Head No Cont (01.31.23 @ 13:43) >      < from: CT Head No Cont (01.31.23 @ 13:43) >      IMPRESSION:    1.  No evidence of acute intracranial hemorrhage.    2.  Lacunar infarct in the right internal capsule, potentially new since   the prior exam. Further evaluation with MRI may be obtained as clinically   warranted.    3.  Stable mild chronic microvascular changes and chronic lacunar infarct   in the left thalamus.    --- End of Report ---            LIS GONZALEZ MD; Attending Radiologist  This document has been electronically signed. Jan 31 2023  2:02PM    < from: Xray Hand 3 Views, Left (01.31.23 @ 13:03) >    Findings/  impression:    There are mild degenerative changes. A fracture line is seen involving   the proximal fifth phalanx. There is adjacent soft tissue swelling.    --- End of Report ---    NITO WASHINGTON MD; Attending Radiologist  This document has been electronically signed. Jan 31 2023  2:16PM      < from: Xray Pelvis AP only (01.31.23 @ 13:03) >    IMPRESSION:    No acute fracture.    --- End of Report ---    < from: Xray Pelvis AP only (01.31.23 @ 13:03) >    IMPRESSION:    No acute fracture.    --- End of Report ---    < end of copied text >

## 2023-01-31 NOTE — PATIENT PROFILE ADULT - NSTOBACCOQUITATTEMPT_GEN_A_CORE_SD
Jaspal Jordan (MD)  Obstetrics and Gynecology  [address] [place] [address]  Phone: [phone]  Fax: [phone]  Follow Up Time:    none

## 2023-01-31 NOTE — ED PROVIDER NOTE - OBJECTIVE STATEMENT
73 y/o male presents to the ED s/p fall from 2 days ago. patient states he was drinking alcohol and fell forward on the kitchen floor. patient with hx of hyperchol, htn, depression, not on anticoagulation. patient c/o bruising to left hand, pain to left 5th digit, right knee pain , left hip pain . patient with prior hx of left hip fracture from 2021. patient has been dizzy since falls and has to ambulate with a walker . no upper or lower extremity weakness. no chest pain , vomiting, abdominal pain . no palpitations, back pain.

## 2023-01-31 NOTE — H&P ADULT - NSHPPHYSICALEXAM_GEN_ALL_CORE
T(C): 35.6 (01-31-23 @ 12:17), Max: 35.6 (01-31-23 @ 12:17)  HR: 87 (01-31-23 @ 12:17) (87 - 87)  BP: 165/89 (01-31-23 @ 12:17) (165/89 - 165/89)  RR: 18 (01-31-23 @ 12:17) (18 - 18)  SpO2: 97% (01-31-23 @ 12:17) (97% - 97%)    PHYSICAL EXAM:  GENERAL: NAD, well-developed, well nourished, looks stated age  HEAD:  Atraumatic, Normocephalic  EYES: EOMI, PERRLA, conjunctiva and sclera clear  NECK: Supple, No JVD, no bruits, no masses, no thyroid enlargement  ENMT: No tonsillar erythema, exudated or enlargement, moist mucous membranes  CHEST/LUNG: Clear to auscultation bilaterally; No rales, rhonchi or wheezing, no dullness to percussion  HEART: S1,S2 Regular rate and rhythm; No murmurs, rubs, or gallops  ABDOMEN: Soft, nontender, nondistended, no rebound tenderness; No palpable masses, no hernias, no organomegaly; Bowel sounds present and normoactive  EXTREMITIES:  2+ peripheral pulses bilaterally and symmetrically, no clubbing, cyanosis, or edema  LYMPH: No lymphadenopathy  PSYCH: AAOx3, normal mood and affect  NEUROLOGY: non-focal, muscle strength 5/5 all extremities, DTRs 2+ symmetrically  SKIN: No rashes or lesions

## 2023-01-31 NOTE — PATIENT PROFILE ADULT - TOBACCO CESSATION EDUCATION/COUNSELLING(PROVIDED IF TOBACCO USED IN THE PAST 30 DAYS) NON CORE MEASURE SITES
Quality 131: Pain Assessment And Follow-Up: Pain assessment using a standardized tool is documented as negative, no follow-up plan required
Quality 226: Preventive Care And Screening: Tobacco Use: Screening And Cessation Intervention: Patient screened for tobacco and never smoked
Detail Level: Detailed
Quality 110: Preventive Care And Screening: Influenza Immunization: Influenza Immunization Administered during Influenza season
Quality 130: Documentation Of Current Medications In The Medical Record: Current Medications Documented
Offered and provided

## 2023-01-31 NOTE — H&P ADULT - HISTORY OF PRESENT ILLNESS
71 yo male with PMHx HTN, HLD, anxiety and ETOH abuse presents after fall 2 days ago. Pt states about 4 weeks ago he got up in middle of the night and tripped over a stool and fell. Seen in ED, Fractured 2 ribs and hit head. Since then he has been having positional dizziness.     2 days ago he drank about 1 pt vodka and had an unwitnessed fall  - pt doesnt remember the fall at all. His wife found him laying face down. Since then he states he has L 5th digit pain, L hip pain (previously fractured hip) and R knee pain and has been using a walker to ambulate 2/2 pain.     He denies any CP, SOB, HA, acute visual changes, slurred speech or weakness.     Denies any h/o MI, CVA. TIA    Admits to x CVA, TIA (mother, alive 98yo)     Admits to drinking about 1 gallon vodka/week. Denies any tremors, Aysha, seizures. No h/o detox/rehab. Pt states " I know I drink too much"  Last drink 2 d ago - pt without any WD sx's presently.

## 2023-01-31 NOTE — ED PROVIDER NOTE - PROGRESS NOTE DETAILS
spoke with neuro dr. becerra who requests cta and admit to tele for cardiac monitoring for arrhythmias spoke with neuro dr. becerra who requests cta and admit to tele for cardiac monitoring for arrhythmias. no neuro checks dr. simmons accepting patient

## 2023-01-31 NOTE — H&P ADULT - ASSESSMENT
71 yo male with PMHx HTN, HLD, anxiety and ETOH abuse presents after possible syncopal episode vs LEIGH found to have lacunar infarct.            Plan:  1. Lacunar infarct  - admit to Tele  - Neuro eval done in ED, will follow  - MRI brain NC  - as per neuro no need for neuro checks  - ASA 81mg daily    2. ETOH abuse  - addiction eval  - Librium 25mg PRN    3. Fifth digit fracture  - splint placed in ED    4. HTN  - monitor BP  - check Ortho BPs  - cont Norvasc    5. HLD  - low fat diet  - increase Lipitor to 40mg    6. Anxiety  - cont Celexa    7. DVT proph  - Lovenox     73 yo male with PMHx HTN, HLD, anxiety and ETOH abuse presents after possible syncopal episode vs LEIGH found to have lacunar infarct.            Plan:  1. Lacunar infarct  - admit to Tele  - Neuro eval done in ED, will follow  - MRI brain NC  - as per neuro no need for neuro checks  - ASA 81mg daily  - consider TTE    2. ETOH abuse  - addiction eval  - Librium 25mg PRN    3. Fifth digit fracture  - splint placed in ED    4. HTN  - monitor BP  - check Ortho BPs  - cont Norvasc    5. HLD  - low fat diet  - increase Lipitor to 40mg    6. Anxiety  - cont Celexa    7. DVT proph  - Lovenox     71 yo male with PMHx HTN, HLD, anxiety and ETOH abuse presents after possible syncopal episode vs LEIGH found to have lacunar infarct.            Plan:  1. Lacunar infarct  - admit to Tele  - Neuro eval done in ED, will follow  - MRI brain NC  - as per neuro no need for neuro checks  - ASA 81mg daily  - consider TTE    2. ETOH abuse  - addiction eval  - Librium 25mg PRN    3. Fifth digit fracture  - splint placed in ED    4. HTN  - monitor BP  - check Ortho BPs  - cont Norvasc    5. HLD  - low fat diet  - increase Lipitor to 40mg    6. Anxiety  - cont Celexa    7. DVT proph  - Lovenox      All above D/W Dr CARLSON   71 yo male with PMHx HTN, HLD, anxiety and ETOH abuse presents after possible syncopal episode vs LEIGH found to have lacunar infarct.        Plan:  1. Cerebrovascular infarct   - CT head w/o con- Lacunar infarct in the right internal capsule, potentially new since.  Stable mild chronic microvascular changes and chronic lacunar infarct in the left thalamus.  - CT head w/ contrast -  Mild stenoses of the posterior cerebral arteries  - Continue Telemetry  - Neurology recs appreciated, will follow  - MRI brain NC pending  - as per neuro no need for neuro checks  - ASA 81mg daily  - TTE pending    2. ETOH abuse  - No hx of w/d seizures, last drink was 1/29  - addiction eval  - Folic acid/MV/Thiamine   - Librium 25mg PRN for breakthrough if needed    3. Fifth digit fracture  - splint placed in ED    4. Rule out syncope  - loss of consciousness,   -Predominantly position  -Check orthostatic BP  - Check echo  -May need dose adjusting of norvasc pending orthostatics    5. HTN  - monitor BP  - cont Norvasc    6. HLD  - low fat diet  - increase Lipitor to 40mg    7. Anxiety  - cont Celexa    8. DVT proph  - Lovenox    9. Obesity  - Provided diet and exercise counseling      All above D/W Dr CARLSON

## 2023-01-31 NOTE — ED PROVIDER NOTE - PHYSICAL EXAMINATION
Vital Signs: I have reviewed the initial vital signs.  Constitutional: well-nourished, no acute distress, normocephalic  Eyes: PERRLA, EOMI, no nystagmus, clear conjunctiva  ENT: MMM, TM b/l clear , no nasal congestion  Cardiovascular: regular rate, regular rhythm, no murmur appreciated  Respiratory: unlabored respiratory effort, clear to auscultation bilaterally  Gastrointestinal: soft, non-tender, non-distended  abdomen, no pulsatile mass  Musculoskeletal: supple neck, no lower extremity edema, no bony tenderness  Integumentary: bruising to left 5th digit ttp distal metacarpal with decreased rom , good cap refill  Neurologic: awake, alert, cranial nerves II-XII grossly intact, extremities’ motor and sensory functions grossly intact, no focal deficits, GCS 15  Psychiatric: appropriate mood, appropriate affect

## 2023-01-31 NOTE — PATIENT PROFILE ADULT - FALL HARM RISK - HARM RISK INTERVENTIONS

## 2023-01-31 NOTE — ED PROVIDER NOTE - CLINICAL SUMMARY MEDICAL DECISION MAKING FREE TEXT BOX
73 yo man with fall 2 days ago while drinking EtOH.  now with recurrent dizziness in the setting of excess ETOH.  Workup revealed potentially new lacunar infarct.  Workup and admission

## 2023-02-01 LAB
ANION GAP SERPL CALC-SCNC: 13 MMOL/L — SIGNIFICANT CHANGE UP (ref 7–14)
BASOPHILS # BLD AUTO: 0.07 K/UL — SIGNIFICANT CHANGE UP (ref 0–0.2)
BASOPHILS NFR BLD AUTO: 1.2 % — HIGH (ref 0–1)
BUN SERPL-MCNC: 22 MG/DL — HIGH (ref 10–20)
CALCIUM SERPL-MCNC: 9.4 MG/DL — SIGNIFICANT CHANGE UP (ref 8.4–10.5)
CHLORIDE SERPL-SCNC: 101 MMOL/L — SIGNIFICANT CHANGE UP (ref 98–110)
CHOLEST SERPL-MCNC: 193 MG/DL — SIGNIFICANT CHANGE UP
CO2 SERPL-SCNC: 23 MMOL/L — SIGNIFICANT CHANGE UP (ref 17–32)
CREAT SERPL-MCNC: 1.1 MG/DL — SIGNIFICANT CHANGE UP (ref 0.7–1.5)
EGFR: 72 ML/MIN/1.73M2 — SIGNIFICANT CHANGE UP
EOSINOPHIL # BLD AUTO: 0.24 K/UL — SIGNIFICANT CHANGE UP (ref 0–0.7)
EOSINOPHIL NFR BLD AUTO: 4 % — SIGNIFICANT CHANGE UP (ref 0–8)
GLUCOSE SERPL-MCNC: 108 MG/DL — HIGH (ref 70–99)
HCT VFR BLD CALC: 39.5 % — LOW (ref 42–52)
HDLC SERPL-MCNC: 43 MG/DL — SIGNIFICANT CHANGE UP
HGB BLD-MCNC: 13.3 G/DL — LOW (ref 14–18)
IMM GRANULOCYTES NFR BLD AUTO: 0.3 % — SIGNIFICANT CHANGE UP (ref 0.1–0.3)
LIPID PNL WITH DIRECT LDL SERPL: 83 MG/DL — SIGNIFICANT CHANGE UP
LYMPHOCYTES # BLD AUTO: 1.58 K/UL — SIGNIFICANT CHANGE UP (ref 1.2–3.4)
LYMPHOCYTES # BLD AUTO: 26.6 % — SIGNIFICANT CHANGE UP (ref 20.5–51.1)
MCHC RBC-ENTMCNC: 30.2 PG — SIGNIFICANT CHANGE UP (ref 27–31)
MCHC RBC-ENTMCNC: 33.7 G/DL — SIGNIFICANT CHANGE UP (ref 32–37)
MCV RBC AUTO: 89.6 FL — SIGNIFICANT CHANGE UP (ref 80–94)
MONOCYTES # BLD AUTO: 0.57 K/UL — SIGNIFICANT CHANGE UP (ref 0.1–0.6)
MONOCYTES NFR BLD AUTO: 9.6 % — HIGH (ref 1.7–9.3)
NEUTROPHILS # BLD AUTO: 3.45 K/UL — SIGNIFICANT CHANGE UP (ref 1.4–6.5)
NEUTROPHILS NFR BLD AUTO: 58.3 % — SIGNIFICANT CHANGE UP (ref 42.2–75.2)
NON HDL CHOLESTEROL: 150 MG/DL — HIGH
NRBC # BLD: 0 /100 WBCS — SIGNIFICANT CHANGE UP (ref 0–0)
PLATELET # BLD AUTO: 210 K/UL — SIGNIFICANT CHANGE UP (ref 130–400)
POTASSIUM SERPL-MCNC: 4.1 MMOL/L — SIGNIFICANT CHANGE UP (ref 3.5–5)
POTASSIUM SERPL-SCNC: 4.1 MMOL/L — SIGNIFICANT CHANGE UP (ref 3.5–5)
RBC # BLD: 4.41 M/UL — LOW (ref 4.7–6.1)
RBC # FLD: 13 % — SIGNIFICANT CHANGE UP (ref 11.5–14.5)
SODIUM SERPL-SCNC: 137 MMOL/L — SIGNIFICANT CHANGE UP (ref 135–146)
TRIGL SERPL-MCNC: 333 MG/DL — HIGH
WBC # BLD: 5.93 K/UL — SIGNIFICANT CHANGE UP (ref 4.8–10.8)
WBC # FLD AUTO: 5.93 K/UL — SIGNIFICANT CHANGE UP (ref 4.8–10.8)

## 2023-02-01 PROCEDURE — 99222 1ST HOSP IP/OBS MODERATE 55: CPT

## 2023-02-01 PROCEDURE — 93306 TTE W/DOPPLER COMPLETE: CPT | Mod: 26

## 2023-02-01 PROCEDURE — 99232 SBSQ HOSP IP/OBS MODERATE 35: CPT

## 2023-02-01 PROCEDURE — 70551 MRI BRAIN STEM W/O DYE: CPT | Mod: 26

## 2023-02-01 RX ADMIN — CITALOPRAM 10 MILLIGRAM(S): 10 TABLET, FILM COATED ORAL at 12:46

## 2023-02-01 RX ADMIN — Medication 100 MILLIGRAM(S): at 12:27

## 2023-02-01 RX ADMIN — Medication 81 MILLIGRAM(S): at 12:27

## 2023-02-01 RX ADMIN — Medication 1 TABLET(S): at 12:27

## 2023-02-01 RX ADMIN — ATORVASTATIN CALCIUM 40 MILLIGRAM(S): 80 TABLET, FILM COATED ORAL at 12:27

## 2023-02-01 RX ADMIN — Medication 1 MILLIGRAM(S): at 12:27

## 2023-02-01 RX ADMIN — AMLODIPINE BESYLATE 10 MILLIGRAM(S): 2.5 TABLET ORAL at 05:44

## 2023-02-01 NOTE — PROGRESS NOTE ADULT - SUBJECTIVE AND OBJECTIVE BOX
PROGRESS NOTE:   Caesar Redman MD  Available on teams    Patient is a 72y old  Male who presents with a chief complaint of Dizziness/ Fall (01 Feb 2023 13:00)      SUBJECTIVE / OVERNIGHT EVENTS:  Seen for follow up for Dizziness/ Fall  Reports no new complaints  Denies fever, chills, fatigue, chest pain, shortness of breath, abdominal pain, nausea/ vomiting or diarrhea    ADDITIONAL REVIEW OF SYSTEMS:    MEDICATIONS  (STANDING):  amLODIPine   Tablet 10 milliGRAM(s) Oral daily  aspirin  chewable 81 milliGRAM(s) Oral daily  atorvastatin 40 milliGRAM(s) Oral daily  citalopram 10 milliGRAM(s) Oral daily  enoxaparin Injectable 40 milliGRAM(s) SubCutaneous every 24 hours  folic acid 1 milliGRAM(s) Oral daily  multivitamin 1 Tablet(s) Oral daily  senna 2 Tablet(s) Oral at bedtime  thiamine 100 milliGRAM(s) Oral daily    MEDICATIONS  (PRN):  acetaminophen     Tablet .. 650 milliGRAM(s) Oral every 6 hours PRN Temp greater or equal to 38C (100.4F), Mild Pain (1 - 3)  aluminum hydroxide/magnesium hydroxide/simethicone Suspension 30 milliLiter(s) Oral every 4 hours PRN Dyspepsia  chlordiazePOXIDE 25 milliGRAM(s) Oral every 4 hours PRN Alcohol Withdrawal Symptoms  melatonin 3 milliGRAM(s) Oral at bedtime PRN Insomnia  ondansetron Injectable 4 milliGRAM(s) IV Push every 8 hours PRN Nausea and/or Vomiting      CAPILLARY BLOOD GLUCOSE        I&O's Summary    01 Feb 2023 07:01  -  01 Feb 2023 14:02  --------------------------------------------------------  IN: 0 mL / OUT: 100 mL / NET: -100 mL        PHYSICAL EXAM:  Vital Signs Last 24 Hrs  T(C): 36.1 (01 Feb 2023 13:47), Max: 36.1 (31 Jan 2023 17:29)  T(F): 97 (01 Feb 2023 13:47), Max: 97 (31 Jan 2023 17:29)  HR: 59 (01 Feb 2023 05:09) (59 - 83)  BP: 147/79 (01 Feb 2023 05:09) (147/79 - 186/83)  BP(mean): --  RR: 16 (01 Feb 2023 13:47) (16 - 18)  SpO2: 95% (01 Feb 2023 13:47) (94% - 99%)    Parameters below as of 01 Feb 2023 13:47  Patient On (Oxygen Delivery Method): room air        GENERAL: No acute distress, well-developed  HEAD:  Atraumatic, Normocephalic  EYES: EOMI, PERRLA, conjunctiva and sclera clear  NECK: Supple, no lymphadenopathy, no JVD  CHEST/LUNG: CTAB; No wheezes, rales, or rhonchi  HEART: Regular rate and rhythm; No murmurs, rubs, or gallops  ABDOMEN: Soft, non-tender, non-distended; normal bowel sounds, no organomegaly  EXTREMITIES:  2+ peripheral pulses b/l, No clubbing, cyanosis, or edema  NEUROLOGY: A&O x 3, no focal deficits  SKIN: No rashes or lesions    LABS:                        13.3   5.93  )-----------( 210      ( 01 Feb 2023 06:49 )             39.5     02-01    137  |  101  |  22<H>  ----------------------------<  108<H>  4.1   |  23  |  1.1    Ca    9.4      01 Feb 2023 06:49  Mg     2.0     01-31    TPro  8.1<H>  /  Alb  4.9  /  TBili  0.8  /  DBili  x   /  AST  19  /  ALT  19  /  AlkPhos  94  01-31                RADIOLOGY & ADDITIONAL TESTS:  Results Reviewed:   Imaging Personally Reviewed:  Electrocardiogram Personally Reviewed:    COORDINATION OF CARE:  Care Discussed with Consultants/Other Providers [Y/N]:  Prior or Outpatient Records Reviewed [Y/N]:

## 2023-02-01 NOTE — PROGRESS NOTE ADULT - ASSESSMENT
73 yo male with PMHx HTN, HLD, anxiety and ETOH abuse presents after possible syncopal episode vs LEIGH found to have lacunar infarct.    Rule out syncope, with loss of consciousness?   Patient admits to drinking EtOH during the time of the event, possible contributor  CT head w/o con- Lacunar infarct in the right internal capsule, potentially new since.  Stable mild chronic microvascular changes and chronic lacunar infarct in the left thalamus.  CT head w/ contrast -  Mild stenoses of the posterior cerebral arteries  Ortho BP negative  - Continue Telemetry  - Neurology following  - MRI brain NC pending  - as per neuro no need for neuro checks  - ASA 81mg daily  - TTE pending    ETOH abuse  No hx of w/d seizures, last drink was 1/29   addiction eval  - Folic acid/MV/Thiamine   - Librium 25mg PRN for breakthrough if needed    Fifth digit fracture  - splint placed in ED    HTN  - monitor BP  - cont Norvasc    HLD  - low fat diet  - Lipitor to 40mg    Anxiety  - cont Celexa    Obesity  - Provided diet and exercise counseling    DVT proph  - Lovenox

## 2023-02-01 NOTE — CONSULT NOTE ADULT - SUBJECTIVE AND OBJECTIVE BOX
YUMIKO GALINDO     72y     Male    MRN-[id number]                                                           CC:Patient is a 72y old  Male who presents with a chief complaint of Dizziness/ Fall (31 Jan 2023 17:03)      HPI:  71 yo male with PMHx HTN, HLD, anxiety and ETOH abuse presents after fall 2 days ago. Pt states about 4 weeks ago he got up in middle of the night and tripped over a stool and fell. Seen in ED, Fractured 2 ribs and hit head. Since then he has been having positional dizziness.     2 days ago he drank about 1 pt vodka and had an unwitnessed fall  - pt doesnt remember the fall at all. His wife found him laying face down. Since then he states he has L 5th digit pain, L hip pain (previously fractured hip) and R knee pain and has been using a walker to ambulate 2/2 pain.     He denies any CP, SOB, HA, acute visual changes, slurred speech or weakness.     Denies any h/o MI, CVA. TIA    Admits to x CVA, TIA (mother, alive 98yo)     Admits to drinking about 1 gallon vodka/week. Denies any tremors, Aysha, seizures. No h/o detox/rehab. Pt states " I know I drink too much"  Last drink 2 d ago - pt without any WD sx's presently. (31 Jan 2023 17:03)      ROS:  Constitutional, Neurological, Psychiatric, Eyes, ENT, Cardiovascular, Respiratory, Gastrointestinal, Genitourinary, Musculoskeletal, Integumentary, Endocrine and Heme/Lymph are otherwise negative. Except for    Social History: NoO smoking, No drinking, No drug use    FAMILY HISTORY:      HEALTH ISSUES - PROBLEM Dx:          Vital Signs Last 24 Hrs  T(C): 35.6 (01 Feb 2023 05:09), Max: 36.1 (31 Jan 2023 17:29)  T(F): 96 (01 Feb 2023 05:09), Max: 97 (31 Jan 2023 17:29)  HR: 59 (01 Feb 2023 05:09) (59 - 83)  BP: 147/79 (01 Feb 2023 05:09) (147/79 - 186/83)  BP(mean): --  RR: 18 (01 Feb 2023 05:09) (18 - 18)  SpO2: 94% (01 Feb 2023 05:09) (94% - 99%)    Parameters below as of 01 Feb 2023 05:09  Patient On (Oxygen Delivery Method): room air        Physical Exam:  Constitutional: alert and in no acute distress.  Eyes: the sclera and conjunctiva were normal, pupils were equal in size, round, reactive to light, with normal accommodation and extraocular movements were intact.   Back: no costovertebral angle tenderness and no spinal tenderness.      Neuro Exam:  Orientation: oriented to person, oriented to place and oriented to time.   Attention: normal concentrating ability and visual attention was not decreased.   Language: no difficulty naming common objects, no difficulty repeating a phrase, no difficulty writing a sentence, fluency intact, comprehension intact and reading intact.   Fund of knowledge: displays adequate knowledge of personal past history.   Cranial Nerves: visual acuity intact bilaterally, visual fields full to confrontation, pupils equal round and reactive to light, extraocular motion intact, facial sensation intact symmetrically, face symmetrical, hearing was intact bilaterally, tongue and palate midline, head turning and shoulder shrug symmetric and there was no tongue deviation with protrusion.   Motor: muscle tone was normal in all four extremities, muscle strength was normal in all four extremities and normal bulk in all four extremities.   Sensory exam: light touch was intact.   Coordination:. normal gait. balance was intact. there was no past-pointing. no tremor present.   Deep tendon reflexes:   Biceps right 2+. Biceps left 2+.    Triceps right 2+. Triceps left 2+.    Brachioradialis right 2+. Brachioradialis left 2+.    Patella right 2+. Patella left 2+.    Ankle jerk right 2+. Ankle jerk left 2+.   Plantar responses normal on the right, normal on the left.      NIHSS:     Allergies    No Known Allergies    Intolerances       Home Medications:  amLODIPine 10 mg oral tablet: 1 tab(s) orally once a day (14 Oct 2021 07:32)  atorvastatin 40 mg oral tablet: 1 tab(s) orally once a day (at bedtime) (14 Oct 2021 07:32)  citalopram 10 mg oral tablet: 1 tab(s) orally once a day (31 Jan 2023 17:11)      MEDICATIONS  (STANDING):  amLODIPine   Tablet 10 milliGRAM(s) Oral daily  aspirin  chewable 81 milliGRAM(s) Oral daily  atorvastatin 40 milliGRAM(s) Oral daily  citalopram 10 milliGRAM(s) Oral daily  enoxaparin Injectable 40 milliGRAM(s) SubCutaneous every 24 hours  folic acid 1 milliGRAM(s) Oral daily  multivitamin 1 Tablet(s) Oral daily  senna 2 Tablet(s) Oral at bedtime  thiamine 100 milliGRAM(s) Oral daily    MEDICATIONS  (PRN):  acetaminophen     Tablet .. 650 milliGRAM(s) Oral every 6 hours PRN Temp greater or equal to 38C (100.4F), Mild Pain (1 - 3)  aluminum hydroxide/magnesium hydroxide/simethicone Suspension 30 milliLiter(s) Oral every 4 hours PRN Dyspepsia  chlordiazePOXIDE 25 milliGRAM(s) Oral every 4 hours PRN Alcohol Withdrawal Symptoms  melatonin 3 milliGRAM(s) Oral at bedtime PRN Insomnia  ondansetron Injectable 4 milliGRAM(s) IV Push every 8 hours PRN Nausea and/or Vomiting      LABS:                        13.3   5.93  )-----------( 210      ( 01 Feb 2023 06:49 )             39.5     02-01    137  |  101  |  22<H>  ----------------------------<  108<H>  4.1   |  23  |  1.1    Ca    9.4      01 Feb 2023 06:49  Mg     2.0     01-31    TPro  8.1<H>  /  Alb  4.9  /  TBili  0.8  /  DBili  x   /  AST  19  /  ALT  19  /  AlkPhos  94  01-31            [facility]   < from: CT Head No Cont (01.31.23 @ 13:43) >    IMPRESSION:    1.  No evidence of acute intracranial hemorrhage.    2.  Lacunar infarct in the right internal capsule, potentially new since   the prior exam. Further evaluation with MRI may be obtained as clinically   warranted.    3.  Stable mild chronic microvascular changes and chronic lacunar infarct   in the left thalamus.    --- End of Report ---    < end of copied text >      < from: CT Angio Neck w/ IV Cont (01.31.23 @ 15:48) >  OTHER:  Moderate degenerative changes of the spine.  Mild/moderate paranasal sinus inflammatory changes.  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  Small retained fluid within the esophagus.    IMPRESSION:    1.  No evidence of major vascular stenosis or occlusion.    2.  Mild stenoses of the posterior cerebral arteries.    --- End of Report ---    < end of copied text >      EEG:    Echo:  Carotid Doppler:  Telemetry:    Assessment / Plan: This is a 72y year old Male presenting with   1.                  YUMIKO GALINDO     72y     Male    MRN-[id number]                                                           CC:Patient is a 72y old  Male who presents with a chief complaint of Dizziness/ Fall (31 Jan 2023 17:03)      HPI:  73 yo male with PMHx HTN, HLD, anxiety and ETOH abuse presents after fall 2 days ago. Pt states about 4 weeks ago he got up in middle of the night and tripped over a stool and fell. Seen in ED, Fractured 2 ribs and hit head. Since then he has been having positional dizziness.     2 days ago he drank about 1 pt vodka and had an unwitnessed fall  - pt doesnt remember the fall at all. His wife found him laying face down. Since then he states he has L 5th digit pain, L hip pain (previously fractured hip) and R knee pain and has been using a walker to ambulate 2/2 pain.     He denies any CP, SOB, HA, acute visual changes, slurred speech or weakness.     Denies any h/o MI, CVA. TIA    Admits to x CVA, TIA (mother, alive 98yo)     Admits to drinking about 1 gallon vodka/week. Denies any tremors, Aysha, seizures. No h/o detox/rehab. Pt states " I know I drink too much"  Last drink 2 d ago - pt without any WD sx's presently. (31 Jan 2023 17:03)    Patient seen and examined and history reviewed with patient.  Was drinking and accidentally slipped on stool.  CTH reviewed from earlier in month and likely findings on this current CTH was also seen on the prior CTH.    ROS:  Constitutional, Neurological, Psychiatric, Eyes, ENT, Cardiovascular, Respiratory, Gastrointestinal, Genitourinary, Musculoskeletal, Integumentary, Endocrine and Heme/Lymph are otherwise negative. Except for    Social History: NoO smoking, No drinking, No drug use    FAMILY HISTORY:      HEALTH ISSUES - PROBLEM Dx:          Vital Signs Last 24 Hrs  T(C): 35.6 (01 Feb 2023 05:09), Max: 36.1 (31 Jan 2023 17:29)  T(F): 96 (01 Feb 2023 05:09), Max: 97 (31 Jan 2023 17:29)  HR: 59 (01 Feb 2023 05:09) (59 - 83)  BP: 147/79 (01 Feb 2023 05:09) (147/79 - 186/83)  BP(mean): --  RR: 18 (01 Feb 2023 05:09) (18 - 18)  SpO2: 94% (01 Feb 2023 05:09) (94% - 99%)    Parameters below as of 01 Feb 2023 05:09  Patient On (Oxygen Delivery Method): room air        Physical Exam:  Constitutional: alert and in no acute distress.  Eyes: the sclera and conjunctiva were normal, pupils were equal in size, round, reactive to light, with normal accommodation and extraocular movements were intact.   Back: no costovertebral angle tenderness and no spinal tenderness.      Neuro Exam:  Orientation: oriented to person, oriented to place and oriented to time.   Attention: normal concentrating ability and visual attention was not decreased.   Language: no difficulty naming common objects, no difficulty repeating a phrase, no difficulty writing a sentence, fluency intact, comprehension intact and reading intact.   Fund of knowledge: displays adequate knowledge of personal past history.   Cranial Nerves: visual acuity intact bilaterally, visual fields full to confrontation, pupils equal round and reactive to light, extraocular motion intact, facial sensation intact symmetrically, face symmetrical, hearing was intact bilaterally, tongue and palate midline, head turning and shoulder shrug symmetric and there was no tongue deviation with protrusion.   Motor: muscle tone was normal in all four extremities, muscle strength was normal in all four extremities and normal bulk in all four extremities.   Sensory exam: light touch was intact.   Coordination:. normal gait. balance was intact. there was no past-pointing. no tremor present.   Deep tendon reflexes:   2+ in UE and 1+ patellar absent ankles  Plantar responses normal on the right, normal on the left.      NIHSS: 0  mrankin 0    Allergies    No Known Allergies    Intolerances       Home Medications:  amLODIPine 10 mg oral tablet: 1 tab(s) orally once a day (14 Oct 2021 07:32)  atorvastatin 40 mg oral tablet: 1 tab(s) orally once a day (at bedtime) (14 Oct 2021 07:32)  citalopram 10 mg oral tablet: 1 tab(s) orally once a day (31 Jan 2023 17:11)      MEDICATIONS  (STANDING):  amLODIPine   Tablet 10 milliGRAM(s) Oral daily  aspirin  chewable 81 milliGRAM(s) Oral daily  atorvastatin 40 milliGRAM(s) Oral daily  citalopram 10 milliGRAM(s) Oral daily  enoxaparin Injectable 40 milliGRAM(s) SubCutaneous every 24 hours  folic acid 1 milliGRAM(s) Oral daily  multivitamin 1 Tablet(s) Oral daily  senna 2 Tablet(s) Oral at bedtime  thiamine 100 milliGRAM(s) Oral daily    MEDICATIONS  (PRN):  acetaminophen     Tablet .. 650 milliGRAM(s) Oral every 6 hours PRN Temp greater or equal to 38C (100.4F), Mild Pain (1 - 3)  aluminum hydroxide/magnesium hydroxide/simethicone Suspension 30 milliLiter(s) Oral every 4 hours PRN Dyspepsia  chlordiazePOXIDE 25 milliGRAM(s) Oral every 4 hours PRN Alcohol Withdrawal Symptoms  melatonin 3 milliGRAM(s) Oral at bedtime PRN Insomnia  ondansetron Injectable 4 milliGRAM(s) IV Push every 8 hours PRN Nausea and/or Vomiting      LABS:                        13.3   5.93  )-----------( 210      ( 01 Feb 2023 06:49 )             39.5     02-01    137  |  101  |  22<H>  ----------------------------<  108<H>  4.1   |  23  |  1.1    Ca    9.4      01 Feb 2023 06:49  Mg     2.0     01-31    TPro  8.1<H>  /  Alb  4.9  /  TBili  0.8  /  DBili  x   /  AST  19  /  ALT  19  /  AlkPhos  94  01-31            [facility]   < [facility]: CT Head No Cont (01.31.23 @ 13:43) >    IMPRESSION:    1.  No evidence of acute intracranial hemorrhage.    2.  Lacunar infarct in the right internal capsule, potentially new since   the prior exam. Further evaluation with MRI may be obtained as clinically   warranted.    3.  Stable mild chronic microvascular changes and chronic lacunar infarct   in the left thalamus.    --- End of Report ---    < end of copied text >      < from: CT Angio Neck w/ IV Cont (01.31.23 @ 15:48) >  OTHER:  Moderate degenerative changes of the spine.  Mild/moderate paranasal sinus inflammatory changes.  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  Small retained fluid within the esophagus.    IMPRESSION:    1.  No evidence of major vascular stenosis or occlusion.    2.  Mild stenoses of the posterior cerebral arteries.    --- End of Report ---    < end of copied text >          Assessment / Plan: This is a 72y year old Male presenting with fall and found to have new finding on CTH of potential new lacunar infarct on the right IC.  Finding is potential and may have been present on last CTH earlier in January 2023.  He has vascular risk factors and discussed monitoring and modification of vascular risk factors.    1. If MRI shows new or recent infarct then would need aspirin 81mg QD and plavix 75mg QD  2. Atorvastatin 40mg QD  3. ETOH cessation discussed  4. PT/OT  5. If MRI brain negative for acute findings however shows prior lacunar infarcts then would keep on aspirin 81mg QD  6. Call for question or new findings

## 2023-02-02 ENCOUNTER — TRANSCRIPTION ENCOUNTER (OUTPATIENT)
Age: 73
End: 2023-02-02

## 2023-02-02 VITALS — TEMPERATURE: 96 F

## 2023-02-02 DIAGNOSIS — F10.10 ALCOHOL ABUSE, UNCOMPLICATED: ICD-10-CM

## 2023-02-02 LAB
ANION GAP SERPL CALC-SCNC: 12 MMOL/L — SIGNIFICANT CHANGE UP (ref 7–14)
BUN SERPL-MCNC: 22 MG/DL — HIGH (ref 10–20)
CALCIUM SERPL-MCNC: 9.5 MG/DL — SIGNIFICANT CHANGE UP (ref 8.4–10.5)
CHLORIDE SERPL-SCNC: 101 MMOL/L — SIGNIFICANT CHANGE UP (ref 98–110)
CO2 SERPL-SCNC: 23 MMOL/L — SIGNIFICANT CHANGE UP (ref 17–32)
CREAT SERPL-MCNC: 1.2 MG/DL — SIGNIFICANT CHANGE UP (ref 0.7–1.5)
EGFR: 65 ML/MIN/1.73M2 — SIGNIFICANT CHANGE UP
GLUCOSE SERPL-MCNC: 106 MG/DL — HIGH (ref 70–99)
HCT VFR BLD CALC: 39.7 % — LOW (ref 42–52)
HGB BLD-MCNC: 13.2 G/DL — LOW (ref 14–18)
MCHC RBC-ENTMCNC: 29.7 PG — SIGNIFICANT CHANGE UP (ref 27–31)
MCHC RBC-ENTMCNC: 33.2 G/DL — SIGNIFICANT CHANGE UP (ref 32–37)
MCV RBC AUTO: 89.4 FL — SIGNIFICANT CHANGE UP (ref 80–94)
NRBC # BLD: 0 /100 WBCS — SIGNIFICANT CHANGE UP (ref 0–0)
PLATELET # BLD AUTO: 211 K/UL — SIGNIFICANT CHANGE UP (ref 130–400)
POTASSIUM SERPL-MCNC: 4 MMOL/L — SIGNIFICANT CHANGE UP (ref 3.5–5)
POTASSIUM SERPL-SCNC: 4 MMOL/L — SIGNIFICANT CHANGE UP (ref 3.5–5)
RBC # BLD: 4.44 M/UL — LOW (ref 4.7–6.1)
RBC # FLD: 12.7 % — SIGNIFICANT CHANGE UP (ref 11.5–14.5)
SODIUM SERPL-SCNC: 136 MMOL/L — SIGNIFICANT CHANGE UP (ref 135–146)
WBC # BLD: 6.72 K/UL — SIGNIFICANT CHANGE UP (ref 4.8–10.8)
WBC # FLD AUTO: 6.72 K/UL — SIGNIFICANT CHANGE UP (ref 4.8–10.8)

## 2023-02-02 PROCEDURE — 99239 HOSP IP/OBS DSCHRG MGMT >30: CPT

## 2023-02-02 PROCEDURE — 99221 1ST HOSP IP/OBS SF/LOW 40: CPT

## 2023-02-02 RX ORDER — AMLODIPINE BESYLATE 2.5 MG/1
1 TABLET ORAL
Qty: 30 | Refills: 0
Start: 2023-02-02 | End: 2023-03-03

## 2023-02-02 RX ORDER — AMLODIPINE BESYLATE 2.5 MG/1
1 TABLET ORAL
Qty: 0 | Refills: 0 | DISCHARGE

## 2023-02-02 RX ORDER — ASPIRIN/CALCIUM CARB/MAGNESIUM 324 MG
1 TABLET ORAL
Qty: 30 | Refills: 0
Start: 2023-02-02 | End: 2023-03-03

## 2023-02-02 RX ADMIN — Medication 1 TABLET(S): at 11:31

## 2023-02-02 RX ADMIN — CITALOPRAM 10 MILLIGRAM(S): 10 TABLET, FILM COATED ORAL at 11:31

## 2023-02-02 RX ADMIN — Medication 100 MILLIGRAM(S): at 11:31

## 2023-02-02 RX ADMIN — ATORVASTATIN CALCIUM 40 MILLIGRAM(S): 80 TABLET, FILM COATED ORAL at 11:31

## 2023-02-02 RX ADMIN — AMLODIPINE BESYLATE 10 MILLIGRAM(S): 2.5 TABLET ORAL at 06:13

## 2023-02-02 RX ADMIN — Medication 81 MILLIGRAM(S): at 11:31

## 2023-02-02 RX ADMIN — Medication 650 MILLIGRAM(S): at 03:19

## 2023-02-02 RX ADMIN — Medication 1 MILLIGRAM(S): at 11:31

## 2023-02-02 NOTE — DISCHARGE NOTE NURSING/CASE MANAGEMENT/SOCIAL WORK - NSDCPEFALRISK_GEN_ALL_CORE
For information on Fall & Injury Prevention, visit: [email or web address] OR  [email or web address] OR  https://www.cdc.gov/steadi/patient.html

## 2023-02-02 NOTE — CONSULT NOTE ADULT - SUBJECTIVE AND OBJECTIVE BOX
Pt interviewed, examined and EMR chart reviewed.  Pt admits to drinking 3-4 times per week about 1 pint per day intermittently over the last year.  Last Drink 1 day prior to admission   Hx of withdrawal   variable periods of sobriety in the past.  Has been in detox before _____yes,   __X___No        REVIEW OF SYSTEMS:    Constitutional: No fever, weight loss or fatigue  ENT:  No difficulty hearing, tinnitus, vertigo; No sinus or throat pain positive headache  Neck: No pain or stiffness  Respiratory: No cough, wheezing, chills or hemoptysis  Cardiovascular: No chest pain, palpitations, shortness of breath, dizziness or leg swelling  Gastrointestinal: No abdominal or epigastric pain. No nausea, vomiting or hematemesis; No diarrhea or constipation. No melena or hematochezia.  Neurological: No headaches, memory loss, loss of strength, numbness or tremors  Musculoskeletal: No joint pain or swelling; No muscle, back or extremity pain  Psychiatric: No depression, anxiety, mood swings or difficulty sleeping    MEDICATIONS  (STANDING):  amLODIPine   Tablet 10 milliGRAM(s) Oral daily  aspirin  chewable 81 milliGRAM(s) Oral daily  atorvastatin 40 milliGRAM(s) Oral daily  citalopram 10 milliGRAM(s) Oral daily  enoxaparin Injectable 40 milliGRAM(s) SubCutaneous every 24 hours  folic acid 1 milliGRAM(s) Oral daily  multivitamin 1 Tablet(s) Oral daily  senna 2 Tablet(s) Oral at bedtime  thiamine 100 milliGRAM(s) Oral daily    MEDICATIONS  (PRN):  acetaminophen     Tablet .. 650 milliGRAM(s) Oral every 6 hours PRN Temp greater or equal to 38C (100.4F), Mild Pain (1 - 3)  aluminum hydroxide/magnesium hydroxide/simethicone Suspension 30 milliLiter(s) Oral every 4 hours PRN Dyspepsia  chlordiazePOXIDE 25 milliGRAM(s) Oral every 4 hours PRN Alcohol Withdrawal Symptoms  LORazepam   Injectable 0.5 milliGRAM(s) IV Push once PRN MRI USE ONLY FOR AGITATION / ANXIETY  melatonin 3 milliGRAM(s) Oral at bedtime PRN Insomnia  ondansetron Injectable 4 milliGRAM(s) IV Push every 8 hours PRN Nausea and/or Vomiting      Vital Signs Last 24 Hrs  T(C): 35.8 (02 Feb 2023 05:09), Max: 36.1 (01 Feb 2023 13:47)  T(F): 96.4 (02 Feb 2023 05:09), Max: 97 (01 Feb 2023 13:47)  HR: 73 (02 Feb 2023 01:15) (73 - 73)  BP: 164/85 (02 Feb 2023 01:15) (164/85 - 164/85)  BP(mean): --  RR: 17 (02 Feb 2023 01:15) (16 - 17)  SpO2: 98% (02 Feb 2023 01:15) (95% - 98%)    Parameters below as of 02 Feb 2023 01:15  Patient On (Oxygen Delivery Method): room air        PHYSICAL EXAM:    Constitutional: NAD, well-groomed, well-developed  HEENT: PERRLA, EOMI, Normal Hearing, MMM  Neck: No LAD, No JVD  Back: Normal spine flexure, No CVA tenderness  Respiratory: CTAB/L  Cardiovascular: S1 and S2, RRR, no M/G/R  Gastrointestinal: BS+, soft, NT/ND  Extremities: No peripheral edema  Vascular: 2+ peripheral pulses  Neurological: A/O x 3, no focal deficits    LABS:                        13.2   6.72  )-----------( 211      ( 02 Feb 2023 07:02 )             39.7     02-02    136  |  101  |  22<H>  ----------------------------<  106<H>  4.0   |  23  |  1.2    Ca    9.5      02 Feb 2023 07:02  Mg     2.0     01-31    TPro  8.1<H>  /  Alb  4.9  /  TBili  0.8  /  DBili  x   /  AST  19  /  ALT  19  /  AlkPhos  94  01-31        Drug Screen Urine:  Alcohol Level        RADIOLOGY & ADDITIONAL STUDIES:

## 2023-02-02 NOTE — DISCHARGE NOTE PROVIDER - NSDCCPCAREPLAN_GEN_ALL_CORE_FT
PRINCIPAL DISCHARGE DIAGNOSIS  Diagnosis: Syncope  Assessment and Plan of Treatment: - You had an evaluation after you passed out  - Your blood work was normal   - EKG was normal  - Blood pressure was checked in several positions and no changes   - You had an echocardiogram done which was also normal   - Follow up with your PCP and refrain from drinking alcohol      SECONDARY DISCHARGE DIAGNOSES  Diagnosis: Sequela of lacunar infarction  Assessment and Plan of Treatment: - You were found to have an old stroke  - It is not acute, and causing no complications  - Please begint aking aspirin 81mg daily for primary prevention

## 2023-02-02 NOTE — DISCHARGE NOTE NURSING/CASE MANAGEMENT/SOCIAL WORK - NSDCPEWEB_GEN_ALL_CORE
[facility] for Tobacco Control website --- [email or web address] --- [email or web address]

## 2023-02-02 NOTE — CONSULT NOTE ADULT - PROVIDER SPECIALTY LIST ADULT
Department of Urology Division 4  Progress Note      SUBJECTIVE:  Still short of breath    OBJECTIVE:  Looks okay    Physical  VITALS:  BP (!) 150/85   Pulse 96   Temp 97.5 °F (36.4 °C) (Oral)   Resp 18   Ht 6' 2\" (1.88 m)   Wt 284 lb 8 oz (129 kg)   SpO2 93%   BMI 36.53 kg/m²   TEMPERATURE:  Current - Temp: 97.5 °F (36.4 °C);  Max - Temp  Av.8 °F (36.6 °C)  Min: 97.5 °F (36.4 °C)  Max: 98.2 °F (36.8 °C)    Intake/Output Summary (Last 24 hours) at 3/27/2019 1041  Last data filed at 3/27/2019 0814  Gross per 24 hour   Intake 785 ml   Output 1550 ml   Net -765 ml     BACK: no tenderness in spine or flanks  ABDOMEN:  No scars, normal bowel sounds, soft, non-distended, non-tender, no masses palpated, no hepatosplenomegally  GENITAL/URINARY:  His prosthesis is a 3-piece IPP but I cannot deflate this  Bermudez is draining fine    Data  CBC with Differential:    Lab Results   Component Value Date    WBC 6.7 2019    RBC 2.57 2019    HGB 9.0 2019    HCT 26.2 2019     2019    .7 2019    MCH 35.0 2019    MCHC 34.4 2019    RDW 18.0 2019    SEGSPCT 75.8 2013    BANDSPCT 7 2019    METASPCT 1 2015    LYMPHOPCT 19.0 2019    MONOPCT 12.0 2019    EOSPCT 1.3 2010    BASOPCT 0.0 2019    MONOSABS 1.1 2019    LYMPHSABS 1.7 2019    EOSABS 0.1 2019    BASOSABS 0.0 2019    DIFFTYPE Auto 2013     BMP:    Lab Results   Component Value Date     2019    K 4.1 2019     2019    CO2 33 2019    BUN 14 2019    LABALBU 2.5 2019    LABALBU 2.7 2019    CREATININE 0.9 2019    CALCIUM 8.9 2019    GFRAA >60 2019    GFRAA >60 2013    LABGLOM >60 2019    GLUCOSE 111 2019     Urine Culture:  No components found for: JOHANNA  U/A:    Lab Results   Component Value Date    COLORU LOGAN 2019    PHUR 5.5 2019
LABCAST 0-1 Hyaline 03/25/2019    WBCUA see below 03/25/2019    RBCUA >100 03/25/2019    BACTERIA 3+ 03/25/2019    CLARITYU Clear 03/25/2019    SPECGRAV 1.025 03/25/2019    LEUKOCYTESUR TRACE 03/25/2019    UROBILINOGEN 0.2 03/25/2019    BILIRUBINUR SMALL 03/25/2019    BLOODU LARGE 03/25/2019    GLUCOSEU Negative 03/25/2019       ASSESSMENT AND PLAN    Patient Active Problem List   Diagnosis    DISH (diffuse idiopathic skeletal hyperostosis)    Hyperlipidemia    Anxiety    Hypertension, essential    Closed nondisplaced fracture of pubis (HCC)    Bilateral knee pain    Alcohol abuse    Centrilobular emphysema (HCC)    Acute on chronic respiratory failure with hypoxia (HCC)    Other pulmonary embolism without acute cor pulmonale (HCC)    Pulmonary infiltrates    Atelectasis    Pleural effusion    Mediastinal adenopathy    Former smoker    Lactic acid acidosis    Leukocytosis    Pulmonary HTN (HCC)    Alcohol use    Acute diastolic heart failure (HCC)    Obesity    ARF (acute renal failure) (HCC)    Anemia    CHF (congestive heart failure) (HCC)    CHF (congestive heart failure), NYHA class I, acute on chronic, combined ([address]       Cardoza in place for I&O's  Will D/C cardoza in a.m. (with IPP we worry about catheters causing erosion in the distal urethra)  Will sign off
Neurology
Addiction Medicine

## 2023-02-02 NOTE — DISCHARGE NOTE NURSING/CASE MANAGEMENT/SOCIAL WORK - NSDCPEEMAIL_GEN_ALL_CORE
[facility] for Tobacco Control email [email or web address]

## 2023-02-02 NOTE — DISCHARGE NOTE PROVIDER - NSDCMRMEDTOKEN_GEN_ALL_CORE_FT
amLODIPine 10 mg oral tablet: 1 tab(s) orally once a day  Aspirin Enteric Coated 81 mg oral delayed release tablet: 1 tab(s) orally once a day   atorvastatin 40 mg oral tablet: 1 tab(s) orally once a day (at bedtime)  citalopram 10 mg oral tablet: 1 tab(s) orally once a day

## 2023-02-02 NOTE — CONSULT NOTE ADULT - PROBLEM SELECTOR RECOMMENDATION 9
After evaluation at this time would monitor on PRN secondary to use history and lack of withdrawal symptoms. Pt should be on Thiamine and Folic acid. Pt will be monitored and supportive care provided.    Abdominal ultrasound AFP every 6 months for HCC screening  -EGD outpatient for esophageal varices screening   -Follow up with Private GI or our [facility] located at [address] [place] [address]. Phone Number: [phone]  -Alcohol counseling provided   CATCH team involved for aftercare and pt deciding if he will follow up with aftercare. Pt given information and phone numbers for contact to our team as well as other organizations.

## 2023-02-02 NOTE — DISCHARGE NOTE NURSING/CASE MANAGEMENT/SOCIAL WORK - PATIENT PORTAL LINK FT
You can access the FollowMyHealth Patient Portal offered by [facility] by registering at the following website: [email or web address]. By joining [employer]’s FollowMyHealth portal, you will also be able to view your health information using other applications (apps) compatible with our system.

## 2023-02-02 NOTE — DISCHARGE NOTE PROVIDER - HOSPITAL COURSE
73 yo male with PMHx HTN, HLD, anxiety and ETOH abuse presents after possible syncopal episode found to have lacunar infarct on CT scan     #Rule out syncope, with loss of consciousness?   - Patient admits to drinking EtOH during the time of the event, possible contributor  - Orthostatic BP negative  -  Monitored on tele with no events   - TTE: . LV Ejection Fraction by Brown's Method with a biplane EF of 72 %, Normal left atrial size, no evidence of pericardial effusion, Mild tricuspid regurgitation, mild to moderate aortic regurgitation.    #Chronic Lacunar infarct   - CT head w/o con- Lacunar infarct in the right internal capsule, potentially new since.  Stable mild chronic microvascular changes and chronic lacunar infarct in the left thalamus.  - CT head w/ contrast -  Mild stenoses of the posterior cerebral arteries  - Neurology followed  - MRI brain: no acute infarct or intracranial hemorrhage; scattered small foci of white matter signal abnormality, nonspecific in etiology; chronic lacunar infarct within the left thalamus.  - Given chronicity of infarct, -started on Aspirin 81mg daily only and deferred plavix    #ETOH abuse  - No hx of w/d seizures, last drink was 1/29  - No evidence of withdrawal symptoms throughout admission and no benzo requirement   - Placed on Folic acid/MV/Thiamine   - CATCH team followed    #Fifth digit fracture  - splint placed in ED    #HTN  - monitor BP  - continued Norvasc    #HLD  - low fat diet  - Increased Lipitor to 40mg    #Anxiety  - continued Celexa    #Obesity  - Provided diet and exercise counseling      Dispo: Patient stable for discharge home on 2/2/23. Advised to follow up with outpatient providers. Encouraged on alcohol cessation 71 yo male with PMHx HTN, HLD, anxiety and ETOH abuse presents after possible syncopal episode found to have lacunar infarct on CT scan     #Rule out syncope, with loss of consciousness?   - Patient admits to drinking EtOH during the time of the event, possible contributor  - Orthostatic BP negative  -  Monitored on tele with no events   - TTE: . LV Ejection Fraction by Brown's Method with a biplane EF of 72 %, Normal left atrial size, no evidence of pericardial effusion, Mild tricuspid regurgitation, mild to moderate aortic regurgitation.    #Chronic Lacunar infarct   - CT head w/o con- Lacunar infarct in the right internal capsule, potentially new since.  Stable mild chronic microvascular changes and chronic lacunar infarct in the left thalamus.  - CT head w/ contrast -  Mild stenoses of the posterior cerebral arteries  - Neurology followed  - MRI brain: no acute infarct or intracranial hemorrhage; scattered small foci of white matter signal abnormality, nonspecific in etiology; chronic lacunar infarct within the left thalamus.  - Given chronicity of infarct, -started on Aspirin 81mg daily only and deferred plavix    #ETOH abuse  #Suspected thiamine and folate deficiency   - No hx of w/d seizures, last drink was 1/29  - No evidence of withdrawal symptoms throughout admission and no benzo requirement   - Placed on Folic acid/MV/Thiamine   - CATCH team followed    #Fifth digit fracture  - splint placed in ED    #HTN  - monitor BP  - continued Norvasc    #HLD  - low fat diet  - Increased Lipitor to 40mg    #Anxiety  - continued Celexa    #Obesity  - Provided diet and exercise counseling      Dispo: Patient stable for discharge home on 2/2/23. Advised to follow up with outpatient providers. Encouraged on alcohol cessation

## 2023-02-02 NOTE — DISCHARGE NOTE PROVIDER - CARE PROVIDER_API CALL
Joan Arias  Internal Medicine  [address] [place] [address]  Phone: [phone]  Fax: ()-  Follow Up Time: 2 weeks

## 2023-02-02 NOTE — DISCHARGE NOTE PROVIDER - NSDCCAREPROVSEEN_GEN_ALL_CORE_FT
Caesar Redman Feroz, Caesar Slaughter, Randolph Eaton, Vinod Rodrigues, Salbador Redman, Caesar Cuevas, Edwina Franco, Octavia Haile, Ezekiel Walton, Jesse

## 2023-02-08 DIAGNOSIS — K21.9 GASTRO-ESOPHAGEAL REFLUX DISEASE WITHOUT ESOPHAGITIS: ICD-10-CM

## 2023-02-08 DIAGNOSIS — I69.398 OTHER SEQUELAE OF CEREBRAL INFARCTION: ICD-10-CM

## 2023-02-08 DIAGNOSIS — E66.9 OBESITY, UNSPECIFIED: ICD-10-CM

## 2023-02-08 DIAGNOSIS — R55 SYNCOPE AND COLLAPSE: ICD-10-CM

## 2023-02-08 DIAGNOSIS — F10.10 ALCOHOL ABUSE, UNCOMPLICATED: ICD-10-CM

## 2023-02-08 DIAGNOSIS — I10 ESSENTIAL (PRIMARY) HYPERTENSION: ICD-10-CM
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
## 2023-02-08 DIAGNOSIS — Z20.822 CONTACT WITH AND (SUSPECTED) EXPOSURE TO COVID-19: ICD-10-CM

## 2023-02-08 DIAGNOSIS — E78.00 PURE HYPERCHOLESTEROLEMIA, UNSPECIFIED: ICD-10-CM
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
## 2023-02-08 DIAGNOSIS — M25.561 PAIN IN RIGHT KNEE: ICD-10-CM

## 2023-02-08 DIAGNOSIS — F41.9 ANXIETY DISORDER, UNSPECIFIED: ICD-10-CM

## 2023-02-08 DIAGNOSIS — R42 DIZZINESS AND GIDDINESS: ICD-10-CM

## 2023-02-08 DIAGNOSIS — E51.9 THIAMINE DEFICIENCY, UNSPECIFIED: ICD-10-CM

## 2023-02-08 DIAGNOSIS — S62.647A NONDISPLACED FRACTURE OF PROXIMAL PHALANX OF LEFT LITTLE FINGER, INITIAL ENCOUNTER FOR CLOSED FRACTURE: ICD-10-CM
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
## 2023-02-08 DIAGNOSIS — E53.8 DEFICIENCY OF OTHER SPECIFIED B GROUP VITAMINS: ICD-10-CM

## 2023-04-20 ENCOUNTER — OUTPATIENT (OUTPATIENT)
Dept: INPATIENT UNIT | Facility: HOSPITAL | Age: 73
LOS: 1 days | Discharge: HOME | End: 2023-04-20
Payer: MEDICARE

## 2023-04-20 ENCOUNTER — TRANSCRIPTION ENCOUNTER (OUTPATIENT)
Age: 73
End: 2023-04-20

## 2023-04-20 VITALS
TEMPERATURE: 97 F | RESPIRATION RATE: 17 BRPM | HEIGHT: 65 IN | WEIGHT: 223.99 LBS | SYSTOLIC BLOOD PRESSURE: 139 MMHG | HEART RATE: 81 BPM | DIASTOLIC BLOOD PRESSURE: 66 MMHG | OXYGEN SATURATION: 99 %

## 2023-04-20 VITALS
DIASTOLIC BLOOD PRESSURE: 62 MMHG | RESPIRATION RATE: 16 BRPM | OXYGEN SATURATION: 96 % | HEART RATE: 72 BPM | SYSTOLIC BLOOD PRESSURE: 118 MMHG

## 2023-04-20 DIAGNOSIS — H25.11 AGE-RELATED NUCLEAR CATARACT, RIGHT EYE: ICD-10-CM

## 2023-04-20 DIAGNOSIS — Z98.890 OTHER SPECIFIED POSTPROCEDURAL STATES: Chronic | ICD-10-CM

## 2023-04-20 PROCEDURE — V2632: CPT

## 2023-04-20 NOTE — ASU DISCHARGE PLAN (ADULT/PEDIATRIC) - NS MD DC FALL RISK RISK
For information on Fall & Injury Prevention, visit: [email or web address] OR  [email or web address] OR  https://www.cdc.gov/steadi/patient.html

## 2023-04-20 NOTE — ASU PATIENT PROFILE, ADULT - NSICDXPASTMEDICALHX_GEN_ALL_CORE_FT
PAST MEDICAL HISTORY:  High blood cholesterol     History of repair of hip fracture     Hypertension     Lacunar infarction

## 2023-04-20 NOTE — ASU PATIENT PROFILE, ADULT - AS SC BRADEN FRICTION
PAST MEDICAL HISTORY:  Excessive menses     Iron deficiency anemia     Migraine     Multiparity     
(3) no apparent problem

## 2023-04-24 DIAGNOSIS — H25.89 OTHER AGE-RELATED CATARACT: ICD-10-CM

## 2023-04-24 DIAGNOSIS — F17.210 NICOTINE DEPENDENCE, CIGARETTES, UNCOMPLICATED: ICD-10-CM

## 2023-04-24 DIAGNOSIS — I12.9 HYPERTENSIVE CHRONIC KIDNEY DISEASE WITH STAGE 1 THROUGH STAGE 4 CHRONIC KIDNEY DISEASE, OR UNSPECIFIED CHRONIC KIDNEY DISEASE: ICD-10-CM

## 2023-04-24 DIAGNOSIS — N18.30 CHRONIC KIDNEY DISEASE, STAGE 3 UNSPECIFIED: ICD-10-CM

## 2023-04-24 DIAGNOSIS — E11.36 TYPE 2 DIABETES MELLITUS WITH DIABETIC CATARACT: ICD-10-CM

## 2023-04-24 DIAGNOSIS — Z79.82 LONG TERM (CURRENT) USE OF ASPIRIN: ICD-10-CM

## 2023-04-24 DIAGNOSIS — E78.00 PURE HYPERCHOLESTEROLEMIA, UNSPECIFIED: ICD-10-CM

## 2023-04-24 DIAGNOSIS — E11.22 TYPE 2 DIABETES MELLITUS WITH DIABETIC CHRONIC KIDNEY DISEASE: ICD-10-CM

## 2023-04-24 DIAGNOSIS — E66.9 OBESITY, UNSPECIFIED: ICD-10-CM
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
## 2023-09-14 ENCOUNTER — INPATIENT (INPATIENT)
Facility: HOSPITAL | Age: 73
LOS: 1 days | Discharge: HOME | DRG: 312 | End: 2023-09-16
Attending: INTERNAL MEDICINE | Admitting: STUDENT IN AN ORGANIZED HEALTH CARE EDUCATION/TRAINING PROGRAM
Payer: MEDICARE

## 2023-09-14 VITALS
RESPIRATION RATE: 18 BRPM | WEIGHT: 227.08 LBS | TEMPERATURE: 99 F | HEART RATE: 86 BPM | DIASTOLIC BLOOD PRESSURE: 74 MMHG | SYSTOLIC BLOOD PRESSURE: 139 MMHG | HEIGHT: 65 IN | OXYGEN SATURATION: 96 %

## 2023-09-14 DIAGNOSIS — Z98.890 OTHER SPECIFIED POSTPROCEDURAL STATES: Chronic | ICD-10-CM

## 2023-09-14 DIAGNOSIS — R55 SYNCOPE AND COLLAPSE: ICD-10-CM

## 2023-09-14 LAB
ALBUMIN SERPL ELPH-MCNC: 4.5 G/DL — SIGNIFICANT CHANGE UP (ref 3.5–5.2)
ALP SERPL-CCNC: 73 U/L — SIGNIFICANT CHANGE UP (ref 30–115)
ALT FLD-CCNC: 17 U/L — SIGNIFICANT CHANGE UP (ref 0–41)
ANION GAP SERPL CALC-SCNC: 16 MMOL/L — HIGH (ref 7–14)
AST SERPL-CCNC: 36 U/L — SIGNIFICANT CHANGE UP (ref 0–41)
BASOPHILS # BLD AUTO: 0.07 K/UL — SIGNIFICANT CHANGE UP (ref 0–0.2)
BASOPHILS NFR BLD AUTO: 0.6 % — SIGNIFICANT CHANGE UP (ref 0–1)
BILIRUB SERPL-MCNC: 0.2 MG/DL — SIGNIFICANT CHANGE UP (ref 0.2–1.2)
BUN SERPL-MCNC: 20 MG/DL — SIGNIFICANT CHANGE UP (ref 10–20)
CALCIUM SERPL-MCNC: 9.8 MG/DL — SIGNIFICANT CHANGE UP (ref 8.4–10.4)
CHLORIDE SERPL-SCNC: 99 MMOL/L — SIGNIFICANT CHANGE UP (ref 98–110)
CO2 SERPL-SCNC: 19 MMOL/L — SIGNIFICANT CHANGE UP (ref 17–32)
CREAT SERPL-MCNC: 1.2 MG/DL — SIGNIFICANT CHANGE UP (ref 0.7–1.5)
EGFR: 65 ML/MIN/1.73M2 — SIGNIFICANT CHANGE UP
EOSINOPHIL # BLD AUTO: 0.15 K/UL — SIGNIFICANT CHANGE UP (ref 0–0.7)
EOSINOPHIL NFR BLD AUTO: 1.3 % — SIGNIFICANT CHANGE UP (ref 0–8)
ETHANOL SERPL-MCNC: 147 MG/DL — HIGH
GLUCOSE SERPL-MCNC: 100 MG/DL — HIGH (ref 70–99)
HCT VFR BLD CALC: 43.1 % — SIGNIFICANT CHANGE UP (ref 42–52)
HGB BLD-MCNC: 14.4 G/DL — SIGNIFICANT CHANGE UP (ref 14–18)
IMM GRANULOCYTES NFR BLD AUTO: 0.3 % — SIGNIFICANT CHANGE UP (ref 0.1–0.3)
LYMPHOCYTES # BLD AUTO: 1.77 K/UL — SIGNIFICANT CHANGE UP (ref 1.2–3.4)
LYMPHOCYTES # BLD AUTO: 15.6 % — LOW (ref 20.5–51.1)
MAGNESIUM SERPL-MCNC: 2.2 MG/DL — SIGNIFICANT CHANGE UP (ref 1.8–2.4)
MCHC RBC-ENTMCNC: 29.6 PG — SIGNIFICANT CHANGE UP (ref 27–31)
MCHC RBC-ENTMCNC: 33.4 G/DL — SIGNIFICANT CHANGE UP (ref 32–37)
MCV RBC AUTO: 88.5 FL — SIGNIFICANT CHANGE UP (ref 80–94)
MONOCYTES # BLD AUTO: 0.75 K/UL — HIGH (ref 0.1–0.6)
MONOCYTES NFR BLD AUTO: 6.6 % — SIGNIFICANT CHANGE UP (ref 1.7–9.3)
NEUTROPHILS # BLD AUTO: 8.6 K/UL — HIGH (ref 1.4–6.5)
NEUTROPHILS NFR BLD AUTO: 75.6 % — HIGH (ref 42.2–75.2)
NRBC # BLD: 0 /100 WBCS — SIGNIFICANT CHANGE UP (ref 0–0)
PLATELET # BLD AUTO: 228 K/UL — SIGNIFICANT CHANGE UP (ref 130–400)
PMV BLD: 12 FL — HIGH (ref 7.4–10.4)
POTASSIUM SERPL-MCNC: 4.6 MMOL/L — SIGNIFICANT CHANGE UP (ref 3.5–5)
POTASSIUM SERPL-SCNC: 4.6 MMOL/L — SIGNIFICANT CHANGE UP (ref 3.5–5)
PROT SERPL-MCNC: 8.1 G/DL — HIGH (ref 6–8)
RBC # BLD: 4.87 M/UL — SIGNIFICANT CHANGE UP (ref 4.7–6.1)
RBC # FLD: 13.1 % — SIGNIFICANT CHANGE UP (ref 11.5–14.5)
SODIUM SERPL-SCNC: 134 MMOL/L — LOW (ref 135–146)
TROPONIN T SERPL-MCNC: <0.01 NG/ML — SIGNIFICANT CHANGE UP
WBC # BLD: 11.37 K/UL — HIGH (ref 4.8–10.8)
WBC # FLD AUTO: 11.37 K/UL — HIGH (ref 4.8–10.8)

## 2023-09-14 PROCEDURE — 72125 CT NECK SPINE W/O DYE: CPT | Mod: 26,MA

## 2023-09-14 PROCEDURE — 71045 X-RAY EXAM CHEST 1 VIEW: CPT | Mod: 26

## 2023-09-14 PROCEDURE — 97161 PT EVAL LOW COMPLEX 20 MIN: CPT | Mod: GP

## 2023-09-14 PROCEDURE — 99285 EMERGENCY DEPT VISIT HI MDM: CPT | Mod: FS,25

## 2023-09-14 PROCEDURE — 12002 RPR S/N/AX/GEN/TRNK2.6-7.5CM: CPT

## 2023-09-14 PROCEDURE — 93010 ELECTROCARDIOGRAM REPORT: CPT

## 2023-09-14 PROCEDURE — 70551 MRI BRAIN STEM W/O DYE: CPT

## 2023-09-14 PROCEDURE — 84443 ASSAY THYROID STIM HORMONE: CPT

## 2023-09-14 PROCEDURE — 85025 COMPLETE CBC W/AUTO DIFF WBC: CPT

## 2023-09-14 PROCEDURE — 80053 COMPREHEN METABOLIC PANEL: CPT

## 2023-09-14 PROCEDURE — 36415 COLL VENOUS BLD VENIPUNCTURE: CPT

## 2023-09-14 PROCEDURE — 83735 ASSAY OF MAGNESIUM: CPT

## 2023-09-14 PROCEDURE — 70450 CT HEAD/BRAIN W/O DYE: CPT | Mod: 26,MA

## 2023-09-14 PROCEDURE — 84100 ASSAY OF PHOSPHORUS: CPT

## 2023-09-14 RX ORDER — TETANUS TOXOID, REDUCED DIPHTHERIA TOXOID AND ACELLULAR PERTUSSIS VACCINE, ADSORBED 5; 2.5; 8; 8; 2.5 [IU]/.5ML; [IU]/.5ML; UG/.5ML; UG/.5ML; UG/.5ML
0.5 SUSPENSION INTRAMUSCULAR ONCE
Refills: 0 | Status: COMPLETED | OUTPATIENT
Start: 2023-09-14 | End: 2023-09-14

## 2023-09-14 RX ORDER — ACETAMINOPHEN 500 MG
650 TABLET ORAL ONCE
Refills: 0 | Status: COMPLETED | OUTPATIENT
Start: 2023-09-14 | End: 2023-09-14

## 2023-09-14 RX ADMIN — TETANUS TOXOID, REDUCED DIPHTHERIA TOXOID AND ACELLULAR PERTUSSIS VACCINE, ADSORBED 0.5 MILLILITER(S): 5; 2.5; 8; 8; 2.5 SUSPENSION INTRAMUSCULAR at 23:02

## 2023-09-14 RX ADMIN — Medication 650 MILLIGRAM(S): at 23:01

## 2023-09-14 RX ADMIN — Medication 650 MILLIGRAM(S): at 22:31

## 2023-09-14 NOTE — ED ADULT TRIAGE NOTE - NSSEPSISSUSPECTED_ED_A_ED
"    DAILY PROGRESS NOTE  [facility]    Patient Identification:  Name: Mar Camilo  Age: 74 y.o.  Sex: female  :  1947  MRN: [id number]         Primary Care Physician: Vianey Barrios PA-C    Subjective:  Interval History: Feeling and breathing back to normal.  Denying any new complaints    Objective: Clinically seems like she is pretty much back to baseline.  She is appreciative and conversational.  She understands her whole clinical course and has not asked me to discuss her case with anyone additionally.  Nontoxic in appearance    Scheduled Meds:albuterol, 2.5 mg, Nebulization, Q12H - RT  apixaban, 5 mg, Oral, Q12H  budesonide-formoterol, 2 puff, Inhalation, BID - RT  dilTIAZem, 120 mg, Oral, Q8H  docosanol, 1 application, Topical, 5x Daily  docusate sodium, 100 mg, Oral, Every Other Day  FLUoxetine, 40 mg, Oral, Daily  furosemide, 40 mg, Oral, Daily  insulin lispro, 0-9 Units, Subcutaneous, TID AC  insulin lispro, 10 Units, Subcutaneous, TID With Meals  lamoTRIgine, 100 mg, Oral, Daily  lisinopril, 20 mg, Oral, Q24H  pantoprazole, 40 mg, Oral, BID  polyethylene glycol, 17 g, Oral, BID  sodium chloride, 10 mL, Intravenous, Q12H  tiotropium bromide monohydrate, 2 puff, Inhalation, Daily - RT      Continuous Infusions:     Vital signs in last 24 hours:  Temp:  [97.2 °F (36.2 °C)-98 °F (36.7 °C)] 98 °F (36.7 °C)  Heart Rate:  [] 99  Resp:  [16-18] 18  BP: (126-160)/(59-91) 130/59    Intake/Output:    Intake/Output Summary (Last 24 hours) at 2022 1052  Last data filed at 2022 0116  Gross per 24 hour   Intake 480 ml   Output 450 ml   Net 30 ml       Exam:  /59 (BP Location: Right leg, Patient Position: Lying)   Pulse 99   Temp 98 °F (36.7 °C) (Oral)   Resp 18   Ht 162.6 cm (64.02\")   Wt 103 kg (227 lb 9.6 oz)   LMP  (LMP Unknown)   SpO2 95%   BMI 39.05 kg/m²     General Appearance:    Alert, cooperative, no distress, AAOx3                         Throat:   Oral " mucosa pink and moist                           Neck:   No JVD                         Lungs:    Clear to auscultation bilaterally, respirations unlabored                          Heart:    Regular rate and rhythm, S1 and S2 normal                  Abdomen:     Soft, nontender, bowel sounds active                 Extremities:   Moving all, no cyanosis or edema                        Pulses:   Pulses palpable in all extremities                          Data Review:  Labs in chart were reviewed.    Assessment:  Active Hospital Problems    Diagnosis  POA   • **Large bowel obstruction ([facility]) [K56.609]  Yes   • Anemia [D64.9]  Yes   • Closed displaced fracture of right femoral neck ([facility]) [S72.001A]  Yes   • Crohn's disease of colon without complication ([facility]) [K50.10]  Yes   • CAD (coronary artery disease) [I25.10]  Yes   • PAD (peripheral artery disease) ([facility]) [I73.9]  Yes   • PAF (paroxysmal atrial fibrillation) ([facility]) [I48.0]  Yes   • Ventral hernia with bowel obstruction [K43.6]  Yes   • History of CVA (cerebrovascular accident) [Z86.73]  Not Applicable   • Chronic respiratory failure with hypoxia ([facility]) [J96.11]  Yes   • Class 3 severe obesity in adult ([facility]) [E66.01]  Yes   • COPD (chronic obstructive pulmonary disease) ([facility]) [J44.9]  Yes   • Essential hypertension [I10]  Yes   • Type 2 diabetes mellitus with complication, without long-term current use of insulin ([facility]) [E11.8]  Yes      Resolved Hospital Problems   No resolved problems to display.       Plan:    Acute diastolic CHF secondary to cardiac arrhythmia/SVT/A. Fib              -Excellent response to IV Lasix 40 mg now transitioned over to 40 mg p.o. daily              -Appreciate cardiology -rate still can jump up to 140s and she remains asymptomatic but this definitely guards me with the ability to maintain discharge without the facility sending right back to the hospital -discussed this with charge RN     LUCIUS Moreno - chest x-ray with no pneumonia     Large  ventral hernia with resolved high-grade bowel obstruction              -Surgery advancing diet - clinically improving/stable     Abnormal LFTs likely secondary to hypoperfusion of liver              -Dr. Umaña post discharge              -Numbers improving     Right femoral neck fracture status post 1/19/2022 hip percutaneous pinning     Bladder mass noted on 1/26/2022 CT was concerning for hematoma              -CT 1/31/2022 shows dependent layering with cystoscopy endorsed              -Urology consulted  and will consider cystoscopy in the next 3 to 4 weeks but otherwise stable for discharge from their perspective     DM2 with circulatory component with an A1c of 8 -number stable     Anemia of chronic disease status post previous transfusion 2 units              -Hgb stable and improved to 9.4-8.7-8.9              -Eliquis to be resumed 2/2/22.  Previously held secondary to concerns for surgical intervention        Disposition CCP coordinating rehab with multiple referrals pending    Murphy Cueto MD  2/4/2022  10:52 EST     No

## 2023-09-14 NOTE — ED PROVIDER NOTE - PHYSICAL EXAMINATION
Constitutional: Well developed, well nourished. NAD  Head: + laceration noted to occipital area;   Eyes: PERRL, EOMI.  ENT: No nasal discharge. Mucous membranes dry.  Neck: Supple. Painless ROM.  Cardiovascular:  Regular rate and rhythm.   Pulmonary:  Lungs clear to auscultation bilaterally.   Abdominal: Soft. Nondistended. No rebound, guarding, rigidity.  Extremities. Pelvis stable. No lower extremity edema, symmetric calves.  Skin: No rashes, cyanosis.  Neuro: AAOx3. No focal neurological deficits.  Psych: Normal mood. Normal affect. Constitutional: Well developed, well nourished. NAD  Head: +3  laceration noted to occipital area with surrounding hematoma and crushed tissue;   Eyes: PERRL, EOMI.  ENT: No nasal discharge. Mucous membranes dry.  Neck: Supple. Painless ROM.  Cardiovascular:  Regular rate and rhythm.   Pulmonary:  Lungs clear to auscultation bilaterally.   Abdominal: Soft. Nondistended. No rebound, guarding, rigidity.  Extremities. Pelvis stable. No lower extremity edema, symmetric calves.  Skin: No rashes, cyanosis.  Neuro: AAOx3. No focal neurological deficits.  Psych: Normal mood. Normal affect.

## 2023-09-14 NOTE — ED PROVIDER NOTE - OBJECTIVE STATEMENT
73 yold male to Ed Pmhx Hld, depression, Htn, alcohol abuse; pt s/p syncopal episode witnessed by wife today - fell and hit head on floor; pt with multiple episodes of similar - dizziness/ falls; previously admitted had eval by neuro but not specific cause found as per family; pt admits to drinking alcohol today;

## 2023-09-14 NOTE — ED ADULT TRIAGE NOTE - CHIEF COMPLAINT QUOTE
BIBEMS from home, per EMS he had a syncope episode fell backwards and hit his head on the kitchen floor.  pt noted with a lac on the left side of his head, c/o headahce and neck pain .   per pt he doesn't remember what happened.  per pt wife he went to light a cigaret and she turned around and he was on the floor.  per wife pt lost consciousness and was out for 3-4 mins. per pt he has been having episodes of dizziness for last couple months, but they have been getting worse over the last 3 days.  per pt he has had multiple falls in the last 2 months

## 2023-09-14 NOTE — ED PROVIDER NOTE - ATTENDING APP SHARED VISIT CONTRIBUTION OF CARE
73 yold male to Ed Pmhx Hld, depression, Htn, alcohol abuse; pt s/p syncopal episode witnessed by wife today - fell and hit head on floor; pt with multiple episodes of similar - dizziness/ falls; previously admitted had eval by neuro. in february, was told he had lacunar infarcts but unknown when it was from. Per wife, patient has been falling more and more frequently. Denies drinking daily. Smokes occasionally.

## 2023-09-14 NOTE — ED PROVIDER NOTE - CLINICAL SUMMARY MEDICAL DECISION MAKING FREE TEXT BOX
73 yold male to Ed Pmhx Hld, depression, Htn, alcohol abuse; pt s/p syncopal episode witnessed by wife today - fell and hit head on floor; pt with multiple episodes of similar - dizziness/ falls; previously admitted had eval by neuro. in february, was told he had lacunar infarcts but unknown when it was from. Per wife, patient has been falling more and more frequently. Denies drinking daily. Smokes occasionally. Labs and EKG were ordered and reviewed.  Imaging was ordered and reviewed by me.  Appropriate medications for patient's presenting complaints were ordered and effects were reassessed.  Patient's records (prior hospital) were reviewed.  Additional history was obtained from wife.  Escalation to admission/observation was considered. Patient requires inpatient hospitalization - monitored setting. Patient's wife states patient has fallen 12-13 times since January and will not follow up outpatient with MDs. Laceration repaired and TDAP updated.

## 2023-09-14 NOTE — ED ADULT NURSE NOTE - NSFALLHARMRISKINTERV_ED_ALL_ED
Assistance OOB with selected safe patient handling equipment if applicable/Assistance with ambulation/Communicate risk of Fall with Harm to all staff, patient, and family/Encourage patient to sit up slowly, dangle for a short time, stand at bedside before walking/Monitor gait and stability/Orthostatic vital signs/Provide visual cue: red socks, yellow wristband, yellow gown, etc/Reinforce activity limits and safety measures with patient and family/Bed in lowest position, wheels locked, appropriate side rails in place/Call bell, personal items and telephone in reach/Instruct patient to call for assistance before getting out of bed/chair/stretcher/Non-slip footwear applied when patient is off stretcher/[place] to call system/Physically safe environment - no spills, clutter or unnecessary equipment/Purposeful Proactive Rounding/Room/bathroom lighting operational, light cord in reach

## 2023-09-14 NOTE — ED ADULT NURSE NOTE - OBJECTIVE STATEMENT
Pt brought to Ed for synoptical episode at home x 1 day. Denies fever, n/v/d, abd pain. Bleeding controlled at back of head. +LOC, +head trauma, -AC

## 2023-09-15 PROBLEM — I63.81 OTHER CEREBRAL INFARCTION DUE TO OCCLUSION OR STENOSIS OF SMALL ARTERY: Chronic | Status: ACTIVE | Noted: 2023-04-20

## 2023-09-15 PROBLEM — Z98.890 OTHER SPECIFIED POSTPROCEDURAL STATES: Chronic | Status: ACTIVE | Noted: 2023-04-20

## 2023-09-15 LAB
ALBUMIN SERPL ELPH-MCNC: 4.8 G/DL — SIGNIFICANT CHANGE UP (ref 3.5–5.2)
ALP SERPL-CCNC: 76 U/L — SIGNIFICANT CHANGE UP (ref 30–115)
ALT FLD-CCNC: 14 U/L — SIGNIFICANT CHANGE UP (ref 0–41)
ANION GAP SERPL CALC-SCNC: 15 MMOL/L — HIGH (ref 7–14)
AST SERPL-CCNC: 17 U/L — SIGNIFICANT CHANGE UP (ref 0–41)
BASOPHILS # BLD AUTO: 0.06 K/UL — SIGNIFICANT CHANGE UP (ref 0–0.2)
BASOPHILS NFR BLD AUTO: 0.7 % — SIGNIFICANT CHANGE UP (ref 0–1)
BILIRUB SERPL-MCNC: 0.5 MG/DL — SIGNIFICANT CHANGE UP (ref 0.2–1.2)
BUN SERPL-MCNC: 22 MG/DL — HIGH (ref 10–20)
CALCIUM SERPL-MCNC: 9.7 MG/DL — SIGNIFICANT CHANGE UP (ref 8.4–10.5)
CHLORIDE SERPL-SCNC: 103 MMOL/L — SIGNIFICANT CHANGE UP (ref 98–110)
CO2 SERPL-SCNC: 22 MMOL/L — SIGNIFICANT CHANGE UP (ref 17–32)
CREAT SERPL-MCNC: 1.1 MG/DL — SIGNIFICANT CHANGE UP (ref 0.7–1.5)
EGFR: 72 ML/MIN/1.73M2 — SIGNIFICANT CHANGE UP
EOSINOPHIL # BLD AUTO: 0.16 K/UL — SIGNIFICANT CHANGE UP (ref 0–0.7)
EOSINOPHIL NFR BLD AUTO: 1.7 % — SIGNIFICANT CHANGE UP (ref 0–8)
GLUCOSE SERPL-MCNC: 93 MG/DL — SIGNIFICANT CHANGE UP (ref 70–99)
HCT VFR BLD CALC: 42 % — SIGNIFICANT CHANGE UP (ref 42–52)
HGB BLD-MCNC: 13.8 G/DL — LOW (ref 14–18)
IMM GRANULOCYTES NFR BLD AUTO: 0.3 % — SIGNIFICANT CHANGE UP (ref 0.1–0.3)
LYMPHOCYTES # BLD AUTO: 1.64 K/UL — SIGNIFICANT CHANGE UP (ref 1.2–3.4)
LYMPHOCYTES # BLD AUTO: 17.9 % — LOW (ref 20.5–51.1)
MAGNESIUM SERPL-MCNC: 2.2 MG/DL — SIGNIFICANT CHANGE UP (ref 1.8–2.4)
MCHC RBC-ENTMCNC: 29.5 PG — SIGNIFICANT CHANGE UP (ref 27–31)
MCHC RBC-ENTMCNC: 32.9 G/DL — SIGNIFICANT CHANGE UP (ref 32–37)
MCV RBC AUTO: 89.7 FL — SIGNIFICANT CHANGE UP (ref 80–94)
MONOCYTES # BLD AUTO: 0.77 K/UL — HIGH (ref 0.1–0.6)
MONOCYTES NFR BLD AUTO: 8.4 % — SIGNIFICANT CHANGE UP (ref 1.7–9.3)
NEUTROPHILS # BLD AUTO: 6.52 K/UL — HIGH (ref 1.4–6.5)
NEUTROPHILS NFR BLD AUTO: 71 % — SIGNIFICANT CHANGE UP (ref 42.2–75.2)
NRBC # BLD: 0 /100 WBCS — SIGNIFICANT CHANGE UP (ref 0–0)
PLATELET # BLD AUTO: 250 K/UL — SIGNIFICANT CHANGE UP (ref 130–400)
PMV BLD: 11.5 FL — HIGH (ref 7.4–10.4)
POTASSIUM SERPL-MCNC: 4.4 MMOL/L — SIGNIFICANT CHANGE UP (ref 3.5–5)
POTASSIUM SERPL-SCNC: 4.4 MMOL/L — SIGNIFICANT CHANGE UP (ref 3.5–5)
PROT SERPL-MCNC: 7.5 G/DL — SIGNIFICANT CHANGE UP (ref 6–8)
RBC # BLD: 4.68 M/UL — LOW (ref 4.7–6.1)
RBC # FLD: 13 % — SIGNIFICANT CHANGE UP (ref 11.5–14.5)
SODIUM SERPL-SCNC: 140 MMOL/L — SIGNIFICANT CHANGE UP (ref 135–146)
TSH SERPL-MCNC: 1.41 UIU/ML — SIGNIFICANT CHANGE UP (ref 0.27–4.2)
WBC # BLD: 9.18 K/UL — SIGNIFICANT CHANGE UP (ref 4.8–10.8)
WBC # FLD AUTO: 9.18 K/UL — SIGNIFICANT CHANGE UP (ref 4.8–10.8)

## 2023-09-15 PROCEDURE — 99222 1ST HOSP IP/OBS MODERATE 55: CPT

## 2023-09-15 PROCEDURE — 70551 MRI BRAIN STEM W/O DYE: CPT | Mod: 26

## 2023-09-15 RX ORDER — CITALOPRAM 10 MG/1
10 TABLET, FILM COATED ORAL DAILY
Refills: 0 | Status: DISCONTINUED | OUTPATIENT
Start: 2023-09-15 | End: 2023-09-16

## 2023-09-15 RX ORDER — LISINOPRIL 2.5 MG/1
10 TABLET ORAL DAILY
Refills: 0 | Status: DISCONTINUED | OUTPATIENT
Start: 2023-09-15 | End: 2023-09-16

## 2023-09-15 RX ORDER — ATORVASTATIN CALCIUM 80 MG/1
40 TABLET, FILM COATED ORAL AT BEDTIME
Refills: 0 | Status: DISCONTINUED | OUTPATIENT
Start: 2023-09-15 | End: 2023-09-16

## 2023-09-15 RX ORDER — AMLODIPINE BESYLATE 2.5 MG/1
10 TABLET ORAL DAILY
Refills: 0 | Status: DISCONTINUED | OUTPATIENT
Start: 2023-09-15 | End: 2023-09-16

## 2023-09-15 RX ORDER — ACETAMINOPHEN 500 MG
650 TABLET ORAL ONCE
Refills: 0 | Status: COMPLETED | OUTPATIENT
Start: 2023-09-15 | End: 2023-09-15

## 2023-09-15 RX ORDER — FOLIC ACID 0.8 MG
1 TABLET ORAL DAILY
Refills: 0 | Status: DISCONTINUED | OUTPATIENT
Start: 2023-09-15 | End: 2023-09-16

## 2023-09-15 RX ORDER — ASPIRIN/CALCIUM CARB/MAGNESIUM 324 MG
81 TABLET ORAL DAILY
Refills: 0 | Status: DISCONTINUED | OUTPATIENT
Start: 2023-09-15 | End: 2023-09-16

## 2023-09-15 RX ORDER — THIAMINE MONONITRATE (VIT B1) 100 MG
100 TABLET ORAL DAILY
Refills: 0 | Status: DISCONTINUED | OUTPATIENT
Start: 2023-09-15 | End: 2023-09-16

## 2023-09-15 RX ORDER — ENOXAPARIN SODIUM 100 MG/ML
40 INJECTION SUBCUTANEOUS EVERY 24 HOURS
Refills: 0 | Status: DISCONTINUED | OUTPATIENT
Start: 2023-09-15 | End: 2023-09-16

## 2023-09-15 RX ADMIN — Medication 650 MILLIGRAM(S): at 06:42

## 2023-09-15 RX ADMIN — Medication 1 MILLIGRAM(S): at 17:04

## 2023-09-15 RX ADMIN — Medication 81 MILLIGRAM(S): at 11:57

## 2023-09-15 RX ADMIN — Medication 1 MILLIGRAM(S): at 11:47

## 2023-09-15 RX ADMIN — CITALOPRAM 10 MILLIGRAM(S): 10 TABLET, FILM COATED ORAL at 11:47

## 2023-09-15 RX ADMIN — AMLODIPINE BESYLATE 10 MILLIGRAM(S): 2.5 TABLET ORAL at 06:49

## 2023-09-15 RX ADMIN — ATORVASTATIN CALCIUM 40 MILLIGRAM(S): 80 TABLET, FILM COATED ORAL at 22:29

## 2023-09-15 RX ADMIN — Medication 100 MILLIGRAM(S): at 11:47

## 2023-09-15 NOTE — PATIENT PROFILE ADULT - FALL HARM RISK - HARM RISK INTERVENTIONS

## 2023-09-15 NOTE — PHYSICAL THERAPY INITIAL EVALUATION ADULT - PERTINENT HX OF CURRENT PROBLEM, REHAB EVAL
73 yold male to Ed Pmhx Hld, depression, Htn, alcohol abuse; pt s/p syncopal episode witnessed by wife.

## 2023-09-15 NOTE — H&P ADULT - HISTORY OF PRESENT ILLNESS
73 yold male to Ed Pmhx Hld, depression, Htn, alcohol abuse; pt s/p syncopal episode witnessed by wife today - fell and hit head on floor; pt with multiple episodes of similar - dizziness/ falls; previously admitted in february for similar complaints and syncope r/o, episode was likely thought to be contributed by alcohol use and patient was also started on aspirin for Chronic lacunar infarcts. Per ED note wife endorsed that patient has been having falls more frequently these days. Denies fevers/chills/sob/chest pain/palpitations/sick contacts.    In the ED  /74, HR 86, Temp 98.6, RR 18, Sao2 96% on RA  Labs: wbc 11.3, Na 134, Cr 1.2(at baseline)  Blood alcohol 145 on admission  CT HEAD:  1.  No acute intracranial pathology.  2.  Left parieto-occipital extracalvarial subcutaneous hematoma/swelling.    CT CERVICAL SPINE:  1.  No evidence of acute cervical spine traumatic injury.  2. Degenerative changes of the cervical spine, worse at C6-C7.    admitted to [facility] for syncope workup       73 yold male to Ed Pmhx Hld, depression, Htn, alcohol abuse; pt s/p syncopal episode witnessed by wife today. Per patient HPI dates back to feb, patient was admitted in february for similar complaints and syncope r/o, episode was likely thought to be contributed by alcohol use and patient was also started on aspirin for Chronic lacunar infarcts. Patient has been experiencing intermittent episodes of dizziness, spinning sensation of room triggered by standing up from sitting position and changing positions in bed, ringing sensation in ears +ve, he also noticed that the events are more pronounced when he has alcohol. This afternoon patient had dinner, couple glasses of vodka and then he had an episode of dizziness when he was walking and passed out for around 2 mins when his wife woke him up and called the ambulance, no reported shaking of limbs/urinary or fecal incontinence. Patient hit his head on the left side. Per patient he goes to gym 3-4 times a week and does cardio and no dizziness anytime during those activity.  Denies fevers/chills/sob/chest pain/palpitations/sick contacts.    In the ED  /74, HR 86, Temp 98.6, RR 18, Sao2 96% on RA  Labs: wbc 11.3, Na 134, Cr 1.2(at baseline)  Blood alcohol 145 on admission  CT HEAD:  1.  No acute intracranial pathology.  2.  Left parieto-occipital extracalvarial subcutaneous hematoma/swelling.    CT CERVICAL SPINE:  1.  No evidence of acute cervical spine traumatic injury.  2. Degenerative changes of the cervical spine, worse at C6-C7.    admitted to tele for syncope workup

## 2023-09-15 NOTE — PHYSICAL THERAPY INITIAL EVALUATION ADULT - GENERAL OBSERVATIONS, REHAB EVAL
10:56-11:20am Pt encountered walking out from the bathroom assisted by spouse, A & O x 4, no c/o pain. PT ambulates pt 100 ft back to the room with supervision without AD with minimal balance instability at times due to c/o dizziness while walking. /84, HR 86 in standing, SPO2 100% RACHANO made aware. Stair training deferred at this time for safety concern due to dizziness and h/o syncopal episode "dizzy and collapse" as per pt.  Pt will benefit from skilled PT 3-5x/wk for balance act and gait training to inc indep level in ambulation.

## 2023-09-15 NOTE — PATIENT PROFILE ADULT - FUNCTIONAL ASSESSMENT - BASIC MOBILITY 6.
4-calculated by average/Not able to assess (calculate score using [facility] averaging method)

## 2023-09-15 NOTE — H&P ADULT - NSHPPHYSICALEXAM_GEN_ALL_CORE
Gen: no apparent distress  Eyes: bilateral equal and reactive  HENT: normal ear and throat exam    CV: normal s1/s2  Resp: bilateral clear  Abd: soft, non tender  Back: No CVAT bilaterally, no midline ttp  Skin: No rash, left scalp dressing intact, mild tenderness around  MSK: No LE edema  Neuro: AOx4, no focal deficits, no nystagmus on my exam

## 2023-09-15 NOTE — PATIENT PROFILE ADULT - NSPROSPHOSPCHAPLAINYN_GEN_A_NUR
1st attempt. Was yesterday forgot to documented. 2nd attempt today 6/17/2021. LVM with 3 different locations and phone number for patient to call and request appt with GI.       no

## 2023-09-15 NOTE — PATIENT PROFILE ADULT - NSPROGENSOURCEINFO_GEN_A_NUR
Simple: Patient demonstrates quick and easy understanding/Verbalized Understanding patient/health record

## 2023-09-15 NOTE — H&P ADULT - NSHPLABSRESULTS_GEN_ALL_CORE
Labs: wbc 11.3, Na 134, Cr 1.2(at baseline)  CT HEAD:  1.  No acute intracranial pathology.  2.  Left parieto-occipital extracalvarial subcutaneous hematoma/swelling.    CT CERVICAL SPINE:  1.  No evidence of acute cervical spine traumatic injury.  2. Degenerative changes of the cervical spine, worse at C6-C7.

## 2023-09-15 NOTE — H&P ADULT - ATTENDING COMMENTS
73 yold male to Ed Pmhx Hld, depression, HTN, alcohol abuse; pt s/p syncopal episode witnessed by wife today.    #Syncope  #Frequent Falls  #Chronic lacunar infarcts  Syncope likely orthostatic vs vertigo. Pt states he has been having worsening unsteady gait this year  Echo 02/2023: 72% EF  - Repeat MR head NC, neuro eval if concerning findings  - PT Eval  - Orthostatic VS  - Tele monitoring    #Alcohol use disorder  drinks around 2 glasses of vodka/day, some days 8-9 ounces/day and some days none per pt  blood alcohol 145 on admission  - ciwa protocol, IV ativan PRN if withdrawal sxs  - thiamin, folic acid  - catch team consult    #HTN/HLD  - monitor BP  - Continue amlodipine 10mg qD  - Cont lipitor 40mg qHs    Anxiety  - cont Celexa    DVT PPX, Lovenox    #Progress Note Handoff  Pending (specify): MR head NC, PT Eval, monitor for alcohol withdrawal  Family discussion: evan pt regarding eval for syncope/frequent falls  Disposition: Home

## 2023-09-15 NOTE — H&P ADULT - ASSESSMENT
73 yold male to Ed Pmhx Hld, depression, Htn, alcohol abuse; pt s/p syncopal episode witnessed by wife.    #s/p fall syncope  #Scalp laceration Left parieto-occipital extracalvarial subcutaneous hematoma  - alcohol use  - admitted in february for syncope r/o, episode was likely thought to be contributed by alcohol use and patient was also started on aspirin for Chronic lacunar infarcts and d/triny  - per wife more freq falls now  - EKG on admission on muse - NSR, Twave inversion lead 3(unchanged from prior), no PAC/PVC/BBB  - CT HEAD:  1.  No acute intracranial pathology.  2.  Left parieto-occipital extracalvarial subcutaneous hematoma/swelling.  - CT CERVICAL SPINE:  1.  No evidence of acute cervical spine traumatic injury.  2. Degenerative changes of the cervical spine, worse at C6-C7.    Plan  - orthostatic vitals  - Monitor on tele for tachy/bradyarrhythmia   - PT/OT    #Alcohol use disorder  - drinks ___ /day  - blood alcohol 145 on admission  - ciwa protocol  - thiamin, folic acid  - catch team consult      HTN  - monitor BP  - cont Norvasc    HLD  - low fat diet  - Lipitor to 40mg    Anxiety  - cont Celexa    DVT prophylaxis - SCDs for now  GI prophylaxis - not indicated  Diet - Dash  AAT   73 yold male to Ed Pmhx Hld, depression, Htn, alcohol abuse; pt s/p syncopal episode witnessed by wife.    Does not remember his medication, med rec per last d/c note, obtain medrec in am from pharmacy    #s/p fall, syncope   #suspected vertigo worsened by alcohol use  #Scalp laceration Left parieto-occipital extracalvarial subcutaneous hematoma s/p sutured in ED  #Chronic lacunar infarcts  - alcohol use  - admitted in february for syncope r/o, episode was likely thought to be contributed by alcohol use and patient was also started on aspirin for Chronic lacunar infarcts and d/triny  - per wife more freq falls now  - EKG on admission on muse - NSR, Twave inversion lead 3(unchanged from prior), no PAC/PVC/BBB  - CT HEAD:  1.  No acute intracranial pathology.  2.  Left parieto-occipital extracalvarial subcutaneous hematoma/swelling.  - CT CERVICAL SPINE:  1.  No evidence of acute cervical spine traumatic injury.  2. Degenerative changes of the cervical spine, worse at C6-C7.  MR head non con in feb 2023  1.  No acute infarct or intracranial hemorrhage.    2.  Scattered small foci of white matter signal abnormality, nonspecific   in etiology. Chronic lacunar infarct within the left thalamus.      Plan  - orthostatic vitals  - Monitor on tele for tachy/bradyarrhythmia, doubt cardiac  - no need for eeg history not consistent with seizure  - consider neuro eval  - with repeated falls need to discuss the benefit vs risk of aspirin use for chronic lacunar infarcts, holding for now  - alcohol cessation  - vestibular rehab and ENT eval op  - PT/OT    #Alcohol use disorder  - drinks around 2 glasses of vodka/day, some days 8-9 ounces/day and some days none per pt  - blood alcohol 145 on admission  - ciwa protocol  - thiamin, folic acid  - catch team consult      HTN  - monitor BP  - per patient he stopped using lisnopril   - will restart amlodipine 10mg qd per last dc note    HLD  - low fat diet  - Lipitor to 40mg    Anxiety  - cont Celexa    DVT prophylaxis - SCDs for now  GI prophylaxis - not indicated  Diet - Dash  AAT

## 2023-09-16 ENCOUNTER — TRANSCRIPTION ENCOUNTER (OUTPATIENT)
Age: 73
End: 2023-09-16

## 2023-09-16 VITALS
RESPIRATION RATE: 18 BRPM | SYSTOLIC BLOOD PRESSURE: 143 MMHG | TEMPERATURE: 98 F | OXYGEN SATURATION: 95 % | HEART RATE: 61 BPM | DIASTOLIC BLOOD PRESSURE: 80 MMHG

## 2023-09-16 LAB
ALBUMIN SERPL ELPH-MCNC: 4.3 G/DL — SIGNIFICANT CHANGE UP (ref 3.5–5.2)
ALP SERPL-CCNC: 73 U/L — SIGNIFICANT CHANGE UP (ref 30–115)
ALT FLD-CCNC: 14 U/L — SIGNIFICANT CHANGE UP (ref 0–41)
ANION GAP SERPL CALC-SCNC: 14 MMOL/L — SIGNIFICANT CHANGE UP (ref 7–14)
AST SERPL-CCNC: 17 U/L — SIGNIFICANT CHANGE UP (ref 0–41)
BASOPHILS # BLD AUTO: 0.06 K/UL — SIGNIFICANT CHANGE UP (ref 0–0.2)
BASOPHILS NFR BLD AUTO: 0.8 % — SIGNIFICANT CHANGE UP (ref 0–1)
BILIRUB SERPL-MCNC: 0.5 MG/DL — SIGNIFICANT CHANGE UP (ref 0.2–1.2)
BUN SERPL-MCNC: 20 MG/DL — SIGNIFICANT CHANGE UP (ref 10–20)
CALCIUM SERPL-MCNC: 9.4 MG/DL — SIGNIFICANT CHANGE UP (ref 8.4–10.4)
CHLORIDE SERPL-SCNC: 105 MMOL/L — SIGNIFICANT CHANGE UP (ref 98–110)
CO2 SERPL-SCNC: 19 MMOL/L — SIGNIFICANT CHANGE UP (ref 17–32)
CREAT SERPL-MCNC: 1.1 MG/DL — SIGNIFICANT CHANGE UP (ref 0.7–1.5)
EGFR: 72 ML/MIN/1.73M2 — SIGNIFICANT CHANGE UP
EOSINOPHIL # BLD AUTO: 0.21 K/UL — SIGNIFICANT CHANGE UP (ref 0–0.7)
EOSINOPHIL NFR BLD AUTO: 2.8 % — SIGNIFICANT CHANGE UP (ref 0–8)
GLUCOSE SERPL-MCNC: 108 MG/DL — HIGH (ref 70–99)
HCT VFR BLD CALC: 41.1 % — LOW (ref 42–52)
HGB BLD-MCNC: 13.7 G/DL — LOW (ref 14–18)
IMM GRANULOCYTES NFR BLD AUTO: 0.4 % — HIGH (ref 0.1–0.3)
LYMPHOCYTES # BLD AUTO: 1.34 K/UL — SIGNIFICANT CHANGE UP (ref 1.2–3.4)
LYMPHOCYTES # BLD AUTO: 17.8 % — LOW (ref 20.5–51.1)
MAGNESIUM SERPL-MCNC: 2.1 MG/DL — SIGNIFICANT CHANGE UP (ref 1.8–2.4)
MCHC RBC-ENTMCNC: 29.9 PG — SIGNIFICANT CHANGE UP (ref 27–31)
MCHC RBC-ENTMCNC: 33.3 G/DL — SIGNIFICANT CHANGE UP (ref 32–37)
MCV RBC AUTO: 89.7 FL — SIGNIFICANT CHANGE UP (ref 80–94)
MONOCYTES # BLD AUTO: 0.58 K/UL — SIGNIFICANT CHANGE UP (ref 0.1–0.6)
MONOCYTES NFR BLD AUTO: 7.7 % — SIGNIFICANT CHANGE UP (ref 1.7–9.3)
NEUTROPHILS # BLD AUTO: 5.3 K/UL — SIGNIFICANT CHANGE UP (ref 1.4–6.5)
NEUTROPHILS NFR BLD AUTO: 70.5 % — SIGNIFICANT CHANGE UP (ref 42.2–75.2)
NRBC # BLD: 0 /100 WBCS — SIGNIFICANT CHANGE UP (ref 0–0)
PHOSPHATE SERPL-MCNC: 3.5 MG/DL — SIGNIFICANT CHANGE UP (ref 2.1–4.9)
PLATELET # BLD AUTO: 216 K/UL — SIGNIFICANT CHANGE UP (ref 130–400)
PMV BLD: 11 FL — HIGH (ref 7.4–10.4)
POTASSIUM SERPL-MCNC: 4.6 MMOL/L — SIGNIFICANT CHANGE UP (ref 3.5–5)
POTASSIUM SERPL-SCNC: 4.6 MMOL/L — SIGNIFICANT CHANGE UP (ref 3.5–5)
PROT SERPL-MCNC: 7.3 G/DL — SIGNIFICANT CHANGE UP (ref 6–8)
RBC # BLD: 4.58 M/UL — LOW (ref 4.7–6.1)
RBC # FLD: 13.1 % — SIGNIFICANT CHANGE UP (ref 11.5–14.5)
SODIUM SERPL-SCNC: 138 MMOL/L — SIGNIFICANT CHANGE UP (ref 135–146)
WBC # BLD: 7.52 K/UL — SIGNIFICANT CHANGE UP (ref 4.8–10.8)
WBC # FLD AUTO: 7.52 K/UL — SIGNIFICANT CHANGE UP (ref 4.8–10.8)

## 2023-09-16 PROCEDURE — 99239 HOSP IP/OBS DSCHRG MGMT >30: CPT

## 2023-09-16 RX ADMIN — AMLODIPINE BESYLATE 10 MILLIGRAM(S): 2.5 TABLET ORAL at 05:52

## 2023-09-16 RX ADMIN — CITALOPRAM 10 MILLIGRAM(S): 10 TABLET, FILM COATED ORAL at 11:19

## 2023-09-16 RX ADMIN — Medication 81 MILLIGRAM(S): at 11:19

## 2023-09-16 RX ADMIN — Medication 100 MILLIGRAM(S): at 11:20

## 2023-09-16 RX ADMIN — Medication 1 MILLIGRAM(S): at 11:20

## 2023-09-16 NOTE — DISCHARGE NOTE PROVIDER - HOSPITAL COURSE
73 yold male to Ed Pmhx Hld, depression, Htn, alcohol abuse; pt s/p syncopal episode witnessed by wife today. Per patient HPI dates back to feb, patient was admitted in february for similar complaints and syncope r/o, episode was likely thought to be contributed by alcohol use and patient was also started on aspirin for Chronic lacunar infarcts. Patient has been experiencing intermittent episodes of dizziness, spinning sensation of room triggered by standing up from sitting position and changing positions in bed, ringing sensation in ears +ve, he also noticed that the events are more pronounced when he has alcohol. This afternoon patient had dinner, couple glasses of vodka and then he had an episode of dizziness when he was walking and passed out for around 2 mins when his wife woke him up and called the ambulance, no reported shaking of limbs/urinary or fecal incontinence. Patient hit his head on the left side. Per patient he goes to gym 3-4 times a week and does cardio and no dizziness anytime during those activity.  Denies fevers/chills/sob/chest pain/palpitations/sick contacts.    In the ED  /74, HR 86, Temp 98.6, RR 18, Sao2 96% on RA  Labs: wbc 11.3, Na 134, Cr 1.2(at baseline)  Blood alcohol 145 on admission  CT HEAD:  1.  No acute intracranial pathology.  2.  Left parieto-occipital extracalvarial subcutaneous hematoma/swelling.    CT CERVICAL SPINE:  1.  No evidence of acute cervical spine traumatic injury.  2. Degenerative changes of the cervical spine, worse at C6-C7.    MR Brain  There is no evidence of acute intracranial pathology. No evidence of   acute infarct, intracranial hemorrhage or mass effect.    ASSESSMENT    #Syncope  #Frequent Falls  #Chronic lacunar infarcts  Syncope likely orthostatic vs vertigo. Pt states he has been having worsening unsteady gait this year  Echo 02/2023: 72% EF  - Repeat MR head NC negative  - PT recommended home PT  - Tele monitoring, no events    #Alcohol use disorder  drinks around 2 glasses of vodka/day, some days 8-9 ounces/day and some days none per pt  blood alcohol 145 on admission  - ciwa protocol, IV ativan PRN if withdrawal sxs  - thiamin, folic acid  - catch team on voard  - Ciwa 0 today  - No symptoms of withdrawal    #HTN/HLD  - monitor BP  - Continue amlodipine 10mg qD  - Cont lipitor 40mg qHs    Anxiety  - cont Celexa    Disposition: Home .     73 yold male to Ed Pmhx Hld, depression, Htn, alcohol abuse; pt s/p syncopal episode witnessed by wife today. Per patient HPI dates back to feb, patient was admitted in february for similar complaints and syncope r/o, episode was likely thought to be contributed by alcohol use and patient was also started on aspirin for Chronic lacunar infarcts. Patient has been experiencing intermittent episodes of dizziness, spinning sensation of room triggered by standing up from sitting position and changing positions in bed, ringing sensation in ears +ve, he also noticed that the events are more pronounced when he has alcohol. This afternoon patient had dinner, couple glasses of vodka and then he had an episode of dizziness when he was walking and passed out for around 2 mins when his wife woke him up and called the ambulance, no reported shaking of limbs/urinary or fecal incontinence. Patient hit his head on the left side. Per patient he goes to gym 3-4 times a week and does cardio and no dizziness anytime during those activity.  Denies fevers/chills/sob/chest pain/palpitations/sick contacts.    In the ED  /74, HR 86, Temp 98.6, RR 18, Sao2 96% on RA  Labs: wbc 11.3, Na 134, Cr 1.2(at baseline)  Blood alcohol 145 on admission  CT HEAD:  1.  No acute intracranial pathology.  2.  Left parieto-occipital extracalvarial subcutaneous hematoma/swelling.    CT CERVICAL SPINE:  1.  No evidence of acute cervical spine traumatic injury.  2. Degenerative changes of the cervical spine, worse at C6-C7.    MR Brain  There is no evidence of acute intracranial pathology. No evidence of   acute infarct, intracranial hemorrhage or mass effect.    ASSESSMENT    #Syncope  #Frequent Falls  #Chronic lacunar infarcts  Syncope likely orthostatic vs vertigo. Pt states he has been having worsening unsteady gait this year  Echo 02/2023: 72% EF  - Repeat MR head NC negative  - PT recommended home PT  - Tele monitoring, no events    #Alcohol use disorder  drinks around 2 glasses of vodka/day, some days 8-9 ounces/day and some days none per pt  blood alcohol 145 on admission  - ciwa protocol, IV ativan PRN if withdrawal sxs  - thiamin, folic acid  - catch team on board  - Ciwa 0 today  - No symptoms of withdrawal    #HTN/HLD  - monitor BP  - Continue amlodipine 10mg qD  - Cont lipitor 40mg qHs    Anxiety  - cont Celexa    Disposition: Home .

## 2023-09-16 NOTE — DISCHARGE NOTE PROVIDER - NSFOLLOWUPCLINICS_GEN_ALL_ED_FT
[facility]  Neurology  [address] [place] [address], [address]  [place] [address]  Phone: [phone]  Fax:

## 2023-09-16 NOTE — DISCHARGE NOTE NURSING/CASE MANAGEMENT/SOCIAL WORK - NSDCVIVACCINE_GEN_ALL_CORE_FT
Tdap; 14-Sep-2023 23:02; Karley Rueda (CHANO); [employer]; [id number] (Exp. Date: 03-Oct-2025); IntraMuscular; Deltoid Left.; 0.5 milliLiter(s); VIS (VIS Published: 09-May-2013, VIS Presented: 14-Sep-2023);

## 2023-09-16 NOTE — DISCHARGE NOTE PROVIDER - NSDCMRMEDTOKEN_GEN_ALL_CORE_FT
amLODIPine 10 mg oral tablet: 1 tab(s) orally once a day  Aspirin Enteric Coated 81 mg oral delayed release tablet: 1 tab(s) orally once a day   atorvastatin 40 mg oral tablet: 1 tab(s) orally once a day (at bedtime)  citalopram 10 mg oral tablet: 1 tab(s) orally once a day  lisinopril 10 mg oral tablet: 1 orally once a day

## 2023-09-16 NOTE — DISCHARGE NOTE NURSING/CASE MANAGEMENT/SOCIAL WORK - PATIENT PORTAL LINK FT
You can access the FollowMyHealth Patient Portal offered by [facility] by registering at the following website: [email or web address]. By joining [facility]’s FollowMyHealth portal, you will also be able to view your health information using other applications (apps) compatible with our system.

## 2023-09-16 NOTE — DISCHARGE NOTE NURSING/CASE MANAGEMENT/SOCIAL WORK - NSDCPEFALRISK_GEN_ALL_CORE
For information on Fall & Injury Prevention, visit: [email or web address] OR  [email or web address] OR  https://www.cdc.gov/steadi/patient.html

## 2023-09-16 NOTE — DISCHARGE NOTE PROVIDER - PROVIDER TOKENS
PROVIDER:[TOKEN:[[id number]],FOLLOWUP:[1 week]],PROVIDER:[TOKEN:[[id number]],FOLLOWUP:[1 week]]

## 2023-09-16 NOTE — DISCHARGE NOTE PROVIDER - NSDCDCMDCOMP_GEN_ALL_CORE
4 = No assist / stand by assistance
This document is complete and the patient is ready for discharge.

## 2023-09-16 NOTE — DISCHARGE NOTE PROVIDER - CARE PROVIDERS DIRECT ADDRESSES
,[email or web address]_Unknown,[email or web address]

## 2023-09-16 NOTE — DISCHARGE NOTE PROVIDER - DID THE PATIENT PRESENT WITH OR WAS TREATED FOR MALNUTRITION DURING THIS ADMISSION
"  Understanding Postpartum Depression  Youâve just had a baby. You know you should be excited and happy. But instead you find yourself crying for no reason. You may have trouble coping with your daily tasks. You feel sad, tired, and hopeless most of the time. You may even feel ashamed or guilty. But what youâre going through is not your fault and you can feel better. Talk to your healthcare provider. He or she can help. What is depression? Depression is a mood disorder that affects the way you think and feel. The most common symptom is a feeling of deep sadness. You may also feel as if you just canât cope with life. Other symptoms include:  Â· Gaining or losing a lot of weight  Â· Sleeping too much or too little  Â· Feeling tired all the time  Â· Feeling restless  Â· Crying a lot  Â· Having too little or too much appetite. Â· Withdrawing from friends and family  Â· Having headaches, aches and pains, or stomach problems that won't go away. Â· Fears of harming your baby  Â· Lack of interest in your baby  Â· Feeling worthless or guilty  Â· No longer finding pleasure in things you used to  Â· Having trouble thinking clearly or making decisions  Â· Thinking about death or suicide   Depression after childbirth  You may be weepy and tired right after giving birth. These feelings are normal. Theyâre sometimes called the [facility] blues. â These blues go away after 2 or 3 weeks. However, postpartum (meaning âafter birthâ) depression lasts much longer and is more severe than the ""baby blues. \"" It can make you feel sad and hopeless. You may also fear that your baby will be harmed and worry about being a bad mother. What causes postpartum depression? The exact cause of postpartum depression is unknown. Changes in brain chemistry or structure are believed to play a big role in depression. Â It may be due to changes in your hormones during and after childbirth. You may also be tired from caring for your baby and adjusting to being a mother.  " All these factors may make you feel depressed. In some cases, your genes may also play a role. Depression can be treated  The good news is that there are many ways to treat postpartum depression. Talking to your healthcare provider is the first step toward feeling better. [address] [place] [address].[facility] on Mental [email or web address] org  Â· Mental Health [facility].co.nz. org  Â· National Suicide Nefdhxs245-830-9344 (800-SUICIDE)   Â© 7953-0959 The [address] [place] [address] [place], [place], [address] All rights reserved. This information is not intended as a substitute for professional medical care. Always follow your healthcare professional's instructions. No

## 2023-09-16 NOTE — DISCHARGE NOTE PROVIDER - CARE PROVIDER_API CALL
Joan Arias  Internal Medicine  [address] [place] [address]  Phone: [phone]  Fax: [phone])-  Follow Up Time: 1 week    David Ricardo  Otolaryngology  [address] [place] [address], [address]  [place] [address]  Phone: [phone]  Fax: [phone]  Follow Up Time: 1 week

## 2023-09-16 NOTE — DISCHARGE NOTE PROVIDER - NSDCCPCAREPLAN_GEN_ALL_CORE_FT
PRINCIPAL DISCHARGE DIAGNOSIS  Diagnosis: Syncope  Assessment and Plan of Treatment: Syncope  Syncope is when you temporarily lose consciousness, also called fainting or passing out. It is caused by a sudden decrease in blood flow to the brain. Even though most causes of syncope are not dangerous, syncope can possibly be a sign of a serious medical problem. Signs that you may be about to faint include feeling dizzy, lightheaded, nausea, visual changes, or cold/clammy skin. Do not drive, operate heavy machinery, or play sports until your health care provider says it is okay.  SEEK IMMEDIATE MEDICAL CARE IF YOU HAVE ANY OF THE FOLLOWING SYMPTOMS: severe headache, pain in your chest/abdomen/back, bleeding from your mouth or rectum, palpitations, shortness of breath, pain with breathing, seizure, confusion, or trouble walking.  You were hspitalized after a syndopale episode, CT head and MRI Head were negative. Advise for alcohol cessation and vestibular rehab. Please follow up with an outpatient ENT for further evaluation.        SECONDARY DISCHARGE DIAGNOSES  Diagnosis: Head trauma  Assessment and Plan of Treatment:      PRINCIPAL DISCHARGE DIAGNOSIS  Diagnosis: Syncope  Assessment and Plan of Treatment: Syncope  Syncope is when you temporarily lose consciousness, also called fainting or passing out. It is caused by a sudden decrease in blood flow to the brain. Even though most causes of syncope are not dangerous, syncope can possibly be a sign of a serious medical problem. Signs that you may be about to faint include feeling dizzy, lightheaded, nausea, visual changes, or cold/clammy skin. Do not drive, operate heavy machinery, or play sports until your health care provider says it is okay.  SEEK IMMEDIATE MEDICAL CARE IF YOU HAVE ANY OF THE FOLLOWING SYMPTOMS: severe headache, pain in your chest/abdomen/back, bleeding from your mouth or rectum, palpitations, shortness of breath, pain with breathing, seizure, confusion, or trouble walking.  You were hspitalized after a syncopal episode, CT head and MRI Head were negative. Advise for alcohol cessation and vestibular rehab. Please follow up with an outpatient ENT for further evaluation.        SECONDARY DISCHARGE DIAGNOSES  Diagnosis: Head trauma  Assessment and Plan of Treatment:

## 2023-09-20 DIAGNOSIS — I95.1 ORTHOSTATIC HYPOTENSION: ICD-10-CM

## 2023-09-20 DIAGNOSIS — F10.10 ALCOHOL ABUSE, UNCOMPLICATED: ICD-10-CM

## 2023-09-20 DIAGNOSIS — F41.9 ANXIETY DISORDER, UNSPECIFIED: ICD-10-CM

## 2023-09-20 DIAGNOSIS — F17.210 NICOTINE DEPENDENCE, CIGARETTES, UNCOMPLICATED: ICD-10-CM
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
## 2023-09-20 DIAGNOSIS — R55 SYNCOPE AND COLLAPSE: ICD-10-CM

## 2023-09-20 DIAGNOSIS — I10 ESSENTIAL (PRIMARY) HYPERTENSION: ICD-10-CM

## 2023-09-20 DIAGNOSIS — Z86.73 PERSONAL HISTORY OF TRANSIENT ISCHEMIC ATTACK (TIA), AND CEREBRAL INFARCTION WITHOUT RESIDUAL DEFICITS: ICD-10-CM
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
## 2023-09-20 DIAGNOSIS — Z79.82 LONG TERM (CURRENT) USE OF ASPIRIN: ICD-10-CM

## 2023-09-20 DIAGNOSIS — E78.5 HYPERLIPIDEMIA, UNSPECIFIED: ICD-10-CM

## 2023-09-20 DIAGNOSIS — S01.01XA LACERATION WITHOUT FOREIGN BODY OF SCALP, INITIAL ENCOUNTER: ICD-10-CM

## 2023-09-20 DIAGNOSIS — R42 DIZZINESS AND GIDDINESS: ICD-10-CM

## 2023-09-20 DIAGNOSIS — F32.A DEPRESSION, UNSPECIFIED: ICD-10-CM

## 2023-12-01 ENCOUNTER — OUTPATIENT (OUTPATIENT)
Dept: OUTPATIENT SERVICES | Facility: HOSPITAL | Age: 73
LOS: 1 days | End: 2023-12-01
Payer: MEDICARE

## 2023-12-01 DIAGNOSIS — Z98.890 OTHER SPECIFIED POSTPROCEDURAL STATES: Chronic | ICD-10-CM

## 2023-12-01 DIAGNOSIS — R94.39 ABNORMAL RESULT OF OTHER CARDIOVASCULAR FUNCTION STUDY: ICD-10-CM

## 2023-12-01 DIAGNOSIS — Z00.8 ENCOUNTER FOR OTHER GENERAL EXAMINATION: ICD-10-CM

## 2023-12-01 PROCEDURE — 75574 CT ANGIO HRT W/3D IMAGE: CPT | Mod: 26,MH

## 2023-12-01 PROCEDURE — 75574 CT ANGIO HRT W/3D IMAGE: CPT

## 2023-12-02 DIAGNOSIS — R94.39 ABNORMAL RESULT OF OTHER CARDIOVASCULAR FUNCTION STUDY: ICD-10-CM

## 2023-12-06 ENCOUNTER — OUTPATIENT (OUTPATIENT)
Dept: OUTPATIENT SERVICES | Facility: HOSPITAL | Age: 73
LOS: 1 days | End: 2023-12-06
Payer: MEDICARE

## 2023-12-06 DIAGNOSIS — Z98.890 OTHER SPECIFIED POSTPROCEDURAL STATES: Chronic | ICD-10-CM

## 2023-12-06 PROCEDURE — 0504T: CPT

## 2023-12-06 PROCEDURE — 0503T: CPT

## 2023-12-06 PROCEDURE — 0502T: CPT

## 2023-12-07 DIAGNOSIS — R94.39 ABNORMAL RESULT OF OTHER CARDIOVASCULAR FUNCTION STUDY: ICD-10-CM

## 2023-12-08 DIAGNOSIS — R94.39 ABNORMAL RESULT OF OTHER CARDIOVASCULAR FUNCTION STUDY: ICD-10-CM

## 2023-12-26 PROBLEM — F10.10 ALCOHOL ABUSE, UNCOMPLICATED: Chronic | Status: ACTIVE | Noted: 2023-12-19

## 2023-12-26 PROBLEM — F32.9 MAJOR DEPRESSIVE DISORDER, SINGLE EPISODE, UNSPECIFIED: Chronic | Status: ACTIVE | Noted: 2023-12-19

## 2024-01-02 VITALS
HEIGHT: 65 IN | HEART RATE: 59 BPM | DIASTOLIC BLOOD PRESSURE: 68 MMHG | WEIGHT: 238.1 LBS | RESPIRATION RATE: 16 BRPM | SYSTOLIC BLOOD PRESSURE: 135 MMHG | OXYGEN SATURATION: 96 %

## 2024-01-02 NOTE — H&P CARDIOLOGY - COMMENTS
74 y/o male with PMHx of HTN, HLD, depression, ETOH abuse, with recent admission in 9/2023 for syncopal episode witnessed by wife, pt had similar episode in 02/2023, CT hd was negative, TTE in 2/2023-> EF 72%, was discharged to follow-up as outpatient, with subsequent abnormal CCTA on 12/1/23 which revealed a Calcium score of 739, noncalcified and calcified plaque in the mid RCA with severe stenosis, moderate stenosis in the first diagonal, and moderate stenosis in the ramus artery, and CT FFR -> Hemodynamically significant stenosis in the mid RCA with a FFR CT value across the stenosis of 0.74 (Delta FFR 0.19).  Pt is now referred for LHC with possible intervention if clinically indicated. 71.1 74 y/o male, smoker, with PMHx of HTN, HLD, depression, ETOH abuse, CKD, with recent admission in 9/2023 for syncopal episode witnessed by wife, pt had similar episode in 02/2023, CT hd was negative, TTE in 2/2023-> EF 72%, was discharged to follow-up as outpatient, with subsequent abnormal CCTA on 12/1/23 which revealed a Calcium score of 739, noncalcified and calcified plaque in the mid RCA with severe stenosis, moderate stenosis in the first diagonal, and moderate stenosis in the ramus artery, and CT FFR -> Hemodynamically significant stenosis in the mid RCA with a FFR CT value across the stenosis of 0.74 (Delta FFR 0.19).  Pt is now referred for C with possible intervention if clinically indicated.

## 2024-01-02 NOTE — H&P CARDIOLOGY - NSICDXPASTMEDICALHX_GEN_ALL_CORE_FT
PAST MEDICAL HISTORY:  ETOH abuse     High blood cholesterol     History of repair of hip fracture     Hypertension     Lacunar infarction     Major depression

## 2024-01-03 ENCOUNTER — TRANSCRIPTION ENCOUNTER (OUTPATIENT)
Age: 74
End: 2024-01-03

## 2024-01-03 ENCOUNTER — OUTPATIENT (OUTPATIENT)
Dept: OUTPATIENT SERVICES | Facility: HOSPITAL | Age: 74
LOS: 1 days | Discharge: HOME | End: 2024-01-03
Payer: MEDICARE

## 2024-01-03 VITALS
SYSTOLIC BLOOD PRESSURE: 127 MMHG | DIASTOLIC BLOOD PRESSURE: 72 MMHG | HEART RATE: 62 BPM | OXYGEN SATURATION: 95 % | RESPIRATION RATE: 18 BRPM

## 2024-01-03 DIAGNOSIS — I25.10 ATHEROSCLEROTIC HEART DISEASE OF NATIVE CORONARY ARTERY WITHOUT ANGINA PECTORIS: ICD-10-CM

## 2024-01-03 DIAGNOSIS — Z98.890 OTHER SPECIFIED POSTPROCEDURAL STATES: Chronic | ICD-10-CM

## 2024-01-03 LAB
ANION GAP SERPL CALC-SCNC: 11 MMOL/L — SIGNIFICANT CHANGE UP (ref 7–14)
ANION GAP SERPL CALC-SCNC: 11 MMOL/L — SIGNIFICANT CHANGE UP (ref 7–14)
BUN SERPL-MCNC: 24 MG/DL — HIGH (ref 10–20)
BUN SERPL-MCNC: 24 MG/DL — HIGH (ref 10–20)
CALCIUM SERPL-MCNC: 9.8 MG/DL — SIGNIFICANT CHANGE UP (ref 8.4–10.5)
CALCIUM SERPL-MCNC: 9.8 MG/DL — SIGNIFICANT CHANGE UP (ref 8.4–10.5)
CHLORIDE SERPL-SCNC: 107 MMOL/L — SIGNIFICANT CHANGE UP (ref 98–110)
CHLORIDE SERPL-SCNC: 107 MMOL/L — SIGNIFICANT CHANGE UP (ref 98–110)
CO2 SERPL-SCNC: 21 MMOL/L — SIGNIFICANT CHANGE UP (ref 17–32)
CO2 SERPL-SCNC: 21 MMOL/L — SIGNIFICANT CHANGE UP (ref 17–32)
CREAT SERPL-MCNC: 1.2 MG/DL — SIGNIFICANT CHANGE UP (ref 0.7–1.5)
CREAT SERPL-MCNC: 1.2 MG/DL — SIGNIFICANT CHANGE UP (ref 0.7–1.5)
EGFR: 65 ML/MIN/1.73M2 — SIGNIFICANT CHANGE UP
EGFR: 65 ML/MIN/1.73M2 — SIGNIFICANT CHANGE UP
GLUCOSE SERPL-MCNC: 107 MG/DL — HIGH (ref 70–99)
GLUCOSE SERPL-MCNC: 107 MG/DL — HIGH (ref 70–99)
HCT VFR BLD CALC: 41 % — LOW (ref 42–52)
HCT VFR BLD CALC: 41 % — LOW (ref 42–52)
HGB BLD-MCNC: 13.3 G/DL — LOW (ref 14–18)
HGB BLD-MCNC: 13.3 G/DL — LOW (ref 14–18)
MCHC RBC-ENTMCNC: 29.2 PG — SIGNIFICANT CHANGE UP (ref 27–31)
MCHC RBC-ENTMCNC: 29.2 PG — SIGNIFICANT CHANGE UP (ref 27–31)
MCHC RBC-ENTMCNC: 32.4 G/DL — SIGNIFICANT CHANGE UP (ref 32–37)
MCHC RBC-ENTMCNC: 32.4 G/DL — SIGNIFICANT CHANGE UP (ref 32–37)
MCV RBC AUTO: 90.1 FL — SIGNIFICANT CHANGE UP (ref 80–94)
MCV RBC AUTO: 90.1 FL — SIGNIFICANT CHANGE UP (ref 80–94)
NRBC # BLD: 0 /100 WBCS — SIGNIFICANT CHANGE UP (ref 0–0)
NRBC # BLD: 0 /100 WBCS — SIGNIFICANT CHANGE UP (ref 0–0)
PLATELET # BLD AUTO: 298 K/UL — SIGNIFICANT CHANGE UP (ref 130–400)
PLATELET # BLD AUTO: 298 K/UL — SIGNIFICANT CHANGE UP (ref 130–400)
PMV BLD: 10.7 FL — HIGH (ref 7.4–10.4)
PMV BLD: 10.7 FL — HIGH (ref 7.4–10.4)
POTASSIUM SERPL-MCNC: 4.7 MMOL/L — SIGNIFICANT CHANGE UP (ref 3.5–5)
POTASSIUM SERPL-MCNC: 4.7 MMOL/L — SIGNIFICANT CHANGE UP (ref 3.5–5)
POTASSIUM SERPL-SCNC: 4.7 MMOL/L — SIGNIFICANT CHANGE UP (ref 3.5–5)
POTASSIUM SERPL-SCNC: 4.7 MMOL/L — SIGNIFICANT CHANGE UP (ref 3.5–5)
RBC # BLD: 4.55 M/UL — LOW (ref 4.7–6.1)
RBC # BLD: 4.55 M/UL — LOW (ref 4.7–6.1)
RBC # FLD: 14 % — SIGNIFICANT CHANGE UP (ref 11.5–14.5)
RBC # FLD: 14 % — SIGNIFICANT CHANGE UP (ref 11.5–14.5)
SODIUM SERPL-SCNC: 139 MMOL/L — SIGNIFICANT CHANGE UP (ref 135–146)
SODIUM SERPL-SCNC: 139 MMOL/L — SIGNIFICANT CHANGE UP (ref 135–146)
WBC # BLD: 8.19 K/UL — SIGNIFICANT CHANGE UP (ref 4.8–10.8)
WBC # BLD: 8.19 K/UL — SIGNIFICANT CHANGE UP (ref 4.8–10.8)
WBC # FLD AUTO: 8.19 K/UL — SIGNIFICANT CHANGE UP (ref 4.8–10.8)
WBC # FLD AUTO: 8.19 K/UL — SIGNIFICANT CHANGE UP (ref 4.8–10.8)

## 2024-01-03 PROCEDURE — C1894: CPT

## 2024-01-03 PROCEDURE — 85027 COMPLETE CBC AUTOMATED: CPT

## 2024-01-03 PROCEDURE — 36415 COLL VENOUS BLD VENIPUNCTURE: CPT

## 2024-01-03 PROCEDURE — C1769: CPT

## 2024-01-03 PROCEDURE — 93458 L HRT ARTERY/VENTRICLE ANGIO: CPT

## 2024-01-03 PROCEDURE — C1887: CPT

## 2024-01-03 PROCEDURE — 80048 BASIC METABOLIC PNL TOTAL CA: CPT

## 2024-01-03 RX ORDER — SODIUM CHLORIDE 9 MG/ML
250 INJECTION INTRAMUSCULAR; INTRAVENOUS; SUBCUTANEOUS ONCE
Refills: 0 | Status: COMPLETED | OUTPATIENT
Start: 2024-01-03 | End: 2024-01-03

## 2024-01-03 RX ORDER — CHLORHEXIDINE GLUCONATE 213 G/1000ML
1 SOLUTION TOPICAL ONCE
Refills: 0 | Status: DISCONTINUED | OUTPATIENT
Start: 2024-01-03 | End: 2024-01-03

## 2024-01-03 RX ORDER — SODIUM CHLORIDE 9 MG/ML
1000 INJECTION INTRAMUSCULAR; INTRAVENOUS; SUBCUTANEOUS
Refills: 0 | Status: DISCONTINUED | OUTPATIENT
Start: 2024-01-03 | End: 2024-01-03

## 2024-01-03 RX ORDER — CITALOPRAM 10 MG/1
1 TABLET, FILM COATED ORAL
Qty: 0 | Refills: 0 | DISCHARGE

## 2024-01-03 RX ORDER — NALTREXONE HYDROCHLORIDE 50 MG/1
1 TABLET, FILM COATED ORAL
Refills: 0 | DISCHARGE

## 2024-01-03 RX ORDER — LISINOPRIL 2.5 MG/1
1 TABLET ORAL
Refills: 0 | DISCHARGE

## 2024-01-03 RX ADMIN — SODIUM CHLORIDE 500 MILLILITER(S): 9 INJECTION INTRAMUSCULAR; INTRAVENOUS; SUBCUTANEOUS at 07:04

## 2024-01-03 RX ADMIN — SODIUM CHLORIDE 100 MILLILITER(S): 9 INJECTION INTRAMUSCULAR; INTRAVENOUS; SUBCUTANEOUS at 07:30

## 2024-01-03 NOTE — ASU DISCHARGE PLAN (ADULT/PEDIATRIC) - CARE PROVIDER_API CALL
Yony Freedman  Cardiology  [address] [place] [address], [address] [address]  Phone: [phone]  Fax: [phone]  Follow Up Time: 2 weeks   Yony Freedman  Cardiology  [address] [place] [address], [address] [address]  Phone: [phone]  Fax: [phone]  Follow Up Time: 2 weeks

## 2024-01-03 NOTE — ASU DISCHARGE PLAN (ADULT/PEDIATRIC) - ASU DC SPECIAL INSTRUCTIONSFT
Discharge Instructions as follows:  - Continue medical regimen as prescribed to prevent chest pain.  - Continue baby ASA, CCB, ACE, and Statin   -No BB due to bradycardia  - Instructed to call 911 if chest pain, shortness of breath or bleeding from access site.  - No heavy lifting > 10lbs x 1 week.  - No driving x 24 hours.  - No baths, swimming pools x 1 week, may shower  - Low sodium low fat low cholesterol diet  - Follow-up with Cardiologist in 1-2 weeks after discharge  - Soreness or tenderness at the site is possible, it will diminish over time. You may take Tylenol every 4-6 hours as needed. Nothing stronger is needed. NO Motrin/Ibuprofen  - Any questions call cardiac cath lab [phone] Discharge Instructions as follows:  - Continue medical regimen as prescribed to prevent chest pain.  - Continue baby ASA, CCB, ACE, and Statin   -No BB due to bradycardia  - Instructed to call 911 if chest pain, shortness of breath or bleeding from access site.  - No heavy lifting > 10lbs x 1 week.  - No driving x 24 hours.  - No baths, swimming pools x 1 week, may shower  - Low sodium low fat low cholesterol diet  - Follow-up with Cardiologist in 1-2 weeks after discharge  - Soreness or tenderness at the site is possible, it will diminish over time. You may take Tylenol every 4-6 hours as needed. Nothing stronger is needed. NO Motrin/Ibuprofen  - Any questions call cardiac cath lab [phone]

## 2024-01-03 NOTE — CHART NOTE - NSCHARTNOTEFT_GEN_A_CORE
PRE-OP DIAGNOSIS: CAD Evaluation; (+) CCTA      PROCEDURE:     [x] Coronary Angiogram     [x] LHC     [] LVG     [] RHC     [] Intervention (see below)         PHYSICIAN: Dr. Chaidez; Dr. Reardon     ASSISTANT: Dr. Bolton, Dr. CHON Collins        PROCEDURE DESCRIPTION:     Consent:      [x] Patient     [] Family Member     []  Used        Anesthesia:     [] General     [] Sedation     [x] Local        Access & Closure:     [x] 6Fr Right Radial Artery; Vasc Band     [] Fr Femoral Artery     [] Fr Femoral Vein     [] Fr Brachial Vein       IV Contrast: 45 mL        Intervention: none       Implants: none        FINDINGS:     Coronary Dominance:       LM: no disease     LAD: mild disease     CX: mild disease     Ramus: 50% ostial stenosis; 80% stenosis in the prox segment      RCA: mid RCA  (receiving left to right collaterals)       LVEDP: 10 mmHg        ESTIMATED BLOOD LOSS: < 10 mL        CONDITION:     [x] Good     [] Fair     [] Critical        SPECIMEN REMOVED: N/A       POST-OP DIAGNOSIS:      [] Normal Coronary Angiogram     [] Mild Coronary Artery Disease (< 50% stenosis)     [x] _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 Chaidez; Dr. Reardon     ASSISTANT: Dr. Bolton, Dr. CHON Collins        PROCEDURE DESCRIPTION:     Consent:      [x] Patient     [] Family Member     []  Used        Anesthesia:     [] General     [] Sedation     [x] Local        Access & Closure:     [x] 6Fr Right Radial Artery; Vasc Band     [] Fr Femoral Artery     [] Fr Femoral Vein     [] Fr Brachial Vein       IV Contrast: 45 mL        Intervention: none       Implants: none        FINDINGS:     Coronary Dominance:       LM: no disease     LAD: mild disease     CX: mild disease     Ramus: 50% ostial stenosis; 80% stenosis in the prox segment      RCA: mid RCA  (receiving left to right collaterals)       LVEDP: 10 mmHg        ESTIMATED BLOOD LOSS: < 10 mL        CONDITION:     [x] Good     [] Fair     [] Critical        SPECIMEN REMOVED: N/A       POST-OP DIAGNOSIS:      [] Normal Coronary Angiogram     [] Mild Coronary Artery Disease (< 50% stenosis)     [x] _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 Chaidez; Dr. Reardon     ASSISTANT: Dr. Bolton, Dr. CHON Collins        PROCEDURE DESCRIPTION:     Consent:      [x] Patient     [] Family Member     []  Used        Anesthesia:     [] General     [] Sedation     [x] Local        Access & Closure:     [x] 6Fr Right Radial Artery; Vasc Band     [] Fr Femoral Artery     [] Fr Femoral Vein     [] Fr Brachial Vein       IV Contrast: 45 mL        Intervention: none       Implants: none        FINDINGS:     Coronary Dominance: Right       LM: no disease     LAD: mild disease     CX: mild disease     Ramus: 50% ostial stenosis; 80% stenosis in the prox segment      RCA: mid RCA  (receiving left to right collaterals)       LVEDP: 10 mmHg        ESTIMATED BLOOD LOSS: < 10 mL        CONDITION:     [x] Good     [] Fair     [] Critical        SPECIMEN REMOVED: N/A       POST-OP DIAGNOSIS:      [] Normal Coronary Angiogram     [] Mild Coronary Artery Disease (< 50% stenosis)     [x] _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

## 2024-01-03 NOTE — DISCHARGE NOTE NURSING/CASE MANAGEMENT/SOCIAL WORK - NSDCPEFALRISK_GEN_ALL_CORE
For information on Fall & Injury Prevention, visit: [email or web address]://www.[email or web address]/[email or web address]-prevention-protects-and-maintains-health-and-mobility OR  https://www.[facility]/news/fall-prevention-tips-to-avoid-injury OR  https://www.cdc.gov/steadi/patient.html For information on Fall & Injury Prevention, visit: https://www.[facility]/news/fall-prevention-protects-and-maintains-health-and-mobility OR  https://www.[facility]/news/fall-prevention-tips-to-avoid-injury OR  https://www.cdc.gov/steadi/patient.html

## 2024-01-03 NOTE — ASU PATIENT PROFILE, ADULT - FALL HARM RISK - RISK INTERVENTIONS
Communicate Fall Risk and Risk Factors to all staff, patient, and family/Reinforce activity limits and safety measures with patient and family/Visual Cue: Yellow wristband/Bed in lowest position, wheels locked, appropriate side rails in place/Call bell, personal items and telephone in reach/Instruct patient to call for assistance before getting out of bed or chair/Non-slip footwear when patient is out of bed/[place] to call system/Physically safe environment - no spills, clutter or unnecessary equipment/Purposeful Proactive Rounding/Room/bathroom lighting operational, light cord in reach Communicate Fall Risk and Risk Factors to all staff, patient, and family/Reinforce activity limits and safety measures with patient and family/Visual Cue: Yellow wristband/Bed in lowest position, wheels locked, appropriate side rails in place/Call bell, personal items and telephone in reach/Instruct patient to call for assistance before getting out of bed or chair/Non-slip footwear when patient is out of bed/[place] to call system/Physically safe environment - no spills, clutter or unnecessary equipment/Purposeful Proactive Rounding/Room/bathroom lighting operational, light cord in reach

## 2024-01-03 NOTE — ASU PREOP CHECKLIST - TEMPERATURE IN FAHRENHEIT (DEGREES F)
your side or sitting up in a chair whenever possible, especially the first 24 hours after surgery. ? Use only medicines prescribed by your doctor. ? Do not drink alcohol. ? If you have a dental device to assist you while at rest, use it at all times for the first 24 hours. For patients using CPAP machines:  ? Use your CPAP machine during all periods of sleep as usual.  ? Use your CPAP machine during all periods of daytime rest while on pain medicines. ** Follow up with your primary care doctor for continued care. IF YOU DO NOT TAKE ALL OF YOUR NARCOTIC PAIN MEDICATION, please dispose of them responsibly. There are drop off boxes in the Emergency Departments 24/7 at both [facility] and [facility]. If these locations are not convenient, other options for discarding them can be found at:  [email or web address]    Hospital or office staff may NOT accept any medications to drop off in the cabinet for you. 98

## 2024-01-03 NOTE — ASU DISCHARGE PLAN (ADULT/PEDIATRIC) - NS MD DC FALL RISK RISK
For information on Fall & Injury Prevention, visit: https://www.[facility]/news/fall-prevention-protects-and-maintains-health-and-mobility OR  https://www.[facility]/news/fall-prevention-tips-[email or web address]-injury OR  https://www.cdc.gov/steadi/patient.html For information on Fall & Injury Prevention, visit: https://www.[facility]/news/fall-prevention-protects-and-maintains-health-and-mobility OR  https://www.[facility]/news/fall-prevention-tips-to-avoid-injury OR  https://www.cdc.gov/steadi/patient.html

## 2024-01-03 NOTE — ASU DISCHARGE PLAN (ADULT/PEDIATRIC) - PROVIDER TOKENS
PROVIDER:[TOKEN:[[id number]],FOLLOWUP:[2 weeks]] PROVIDER:[TOKEN:[[id number]],FOLLOWUP:[2 weeks]]

## 2024-01-03 NOTE — DISCHARGE NOTE NURSING/CASE MANAGEMENT/SOCIAL WORK - NSDCVIVACCINE_GEN_ALL_CORE_FT
Tdap; 14-Sep-2023 23:02; Karley Rueda (CHANO); [employer]; [id number] (Exp. Date: 03-Oct-2025); IntraMuscular; Deltoid Left.; 0.5 milliLiter(s); VIS (VIS Published: 09-May-2013, VIS Presented: 14-Sep-2023);    Tdap; 14-Sep-2023 23:02; Karley Rueda (CHANO); [employer]; [id number] (Exp. Date: 03-Oct-2025); IntraMuscular; Deltoid Left.; 0.5 milliLiter(s); VIS (VIS Published: 09-May-2013, VIS Presented: 14-Sep-2023);

## 2024-01-03 NOTE — DISCHARGE NOTE NURSING/CASE MANAGEMENT/SOCIAL WORK - PATIENT PORTAL LINK FT
You can access the FollowMyHealth Patient Portal offered by [facility] by registering at the following website: [email or web address]. By joining [employer]’s FollowMyHealth portal, you will also be able to view your health information using other applications (apps) compatible with our system. You can access the FollowMyHealth Patient Portal offered by [facility] by registering at the following website: [email or web address]. By joining [employer]’s FollowMyHealth portal, you will also be able to view your health information using other applications (apps) compatible with our system.

## 2024-01-05 DIAGNOSIS — R93.1 ABNORMAL FINDINGS ON DIAGNOSTIC IMAGING OF HEART AND CORONARY CIRCULATION: ICD-10-CM

## 2024-01-05 DIAGNOSIS — I25.118 ATHEROSCLEROTIC HEART DISEASE OF NATIVE CORONARY ARTERY WITH OTHER FORMS OF ANGINA PECTORIS: ICD-10-CM

## 2024-03-14 ENCOUNTER — TRANSCRIPTION ENCOUNTER (OUTPATIENT)
Age: 74
End: 2024-03-14

## 2024-03-14 ENCOUNTER — OUTPATIENT (OUTPATIENT)
Dept: OUTPATIENT SERVICES | Facility: HOSPITAL | Age: 74
LOS: 1 days | Discharge: HOME | End: 2024-03-14
Payer: MEDICARE

## 2024-03-14 VITALS — DIASTOLIC BLOOD PRESSURE: 54 MMHG | HEART RATE: 61 BPM | RESPIRATION RATE: 18 BRPM | SYSTOLIC BLOOD PRESSURE: 96 MMHG

## 2024-03-14 VITALS
WEIGHT: 218.04 LBS | TEMPERATURE: 97 F | RESPIRATION RATE: 18 BRPM | HEART RATE: 78 BPM | DIASTOLIC BLOOD PRESSURE: 57 MMHG | OXYGEN SATURATION: 78 % | HEIGHT: 65 IN | SYSTOLIC BLOOD PRESSURE: 108 MMHG

## 2024-03-14 DIAGNOSIS — H25.12 AGE-RELATED NUCLEAR CATARACT, LEFT EYE: ICD-10-CM

## 2024-03-14 DIAGNOSIS — Z98.890 OTHER SPECIFIED POSTPROCEDURAL STATES: Chronic | ICD-10-CM

## 2024-03-14 PROCEDURE — V2632: CPT

## 2024-03-14 NOTE — ASU PATIENT PROFILE, ADULT - NSICDXPASTSURGICALHX_GEN_ALL_CORE_FT
PAST SURGICAL HISTORY:  S/P trigger finger release     Status post cataract extraction and insertion of intraocular lens of right eye

## 2024-03-14 NOTE — ASU PATIENT PROFILE, ADULT - FALL HARM RISK - FACTORS NURSING JUDGEMENT
Yes

## 2024-03-14 NOTE — ASU DISCHARGE PLAN (ADULT/PEDIATRIC) - NS MD DC FALL RISK RISK
For information on Fall & Injury Prevention, visit: https://www.[facility]/news/fall-prevention-protects-and-maintains-health-and-mobility OR  https://www.[facility]/news/fall-prevention-tips-to-avoid-injury OR  https://www.cdc.gov/steadi/patient.html

## 2024-03-14 NOTE — ASU PATIENT PROFILE, ADULT - NSICDXPASTMEDICALHX_GEN_ALL_CORE_FT
PAST MEDICAL HISTORY:  ETOH abuse     High blood cholesterol     History of repair of hip fracture non surgical    Hypertension     Lacunar infarction     Major depression

## 2024-03-18 DIAGNOSIS — N18.9 CHRONIC KIDNEY DISEASE, UNSPECIFIED: ICD-10-CM

## 2024-03-18 DIAGNOSIS — I12.9 HYPERTENSIVE CHRONIC KIDNEY DISEASE WITH STAGE 1 THROUGH STAGE 4 CHRONIC KIDNEY DISEASE, OR UNSPECIFIED CHRONIC KIDNEY DISEASE: ICD-10-CM

## 2024-03-18 DIAGNOSIS — E78.00 PURE HYPERCHOLESTEROLEMIA, UNSPECIFIED: ICD-10-CM

## 2024-03-18 DIAGNOSIS — Z79.82 LONG TERM (CURRENT) USE OF ASPIRIN: ICD-10-CM

## 2024-03-18 DIAGNOSIS — H25.092 OTHER AGE-RELATED INCIPIENT CATARACT, LEFT EYE: ICD-10-CM

## 2024-07-16 ENCOUNTER — NON-APPOINTMENT (OUTPATIENT)
Age: 74
End: 2024-07-16

## 2024-07-16 ENCOUNTER — APPOINTMENT (OUTPATIENT)
Dept: ORTHOPEDIC SURGERY | Facility: CLINIC | Age: 74
End: 2024-07-16

## 2024-07-16 DIAGNOSIS — M70.61 TROCHANTERIC BURSITIS, RIGHT HIP: ICD-10-CM

## 2024-07-16 PROCEDURE — 99203 OFFICE O/P NEW LOW 30 MIN: CPT | Mod: 25

## 2024-07-16 PROCEDURE — 99213 OFFICE O/P EST LOW 20 MIN: CPT | Mod: 25

## 2024-07-16 PROCEDURE — 73502 X-RAY EXAM HIP UNI 2-3 VIEWS: CPT

## 2024-07-16 PROCEDURE — 20610 DRAIN/INJ JOINT/BURSA W/O US: CPT | Mod: RT

## 2024-07-25 NOTE — ED ADULT NURSE NOTE - ALCOHOL PRE SCREEN (AUDIT - C)
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 Mom reports she still hasn't heard back from surgical team when Cy will be having surgery on bilateral femurs, right ankle and right toe.      GOALS/TREATMENT SESSION:  Short Term Goal 1   Initiate HEP with good understanding-met     Goal Met- PT discussed with mom ways to encourage right ankle neutral foot alignment throughout the day and more active assistive stretch when positioned watching TV etc. Mom states patient sits with his legs crossed most of the time and the only time he doesn't cross them is when he is walking.    [x]Met  []Partially met  []Not met   Short Term Goal 2   Mom will report compliance with new orthotics.-met Goal Met  [x]Met  []Partially met  []Not met   Long Term Goal 1   Patient will demonstrate the ability to perform stand to sit transition with 1 hand supported by stable surface x3 trials with cues <40% of the time for proper eccentric control in order to safely 
Statement Selected

## 2024-08-23 ENCOUNTER — APPOINTMENT (OUTPATIENT)
Dept: ORTHOPEDIC SURGERY | Facility: CLINIC | Age: 74
End: 2024-08-23
Payer: MEDICARE

## 2024-08-23 DIAGNOSIS — M16.11 UNILATERAL PRIMARY OSTEOARTHRITIS, RIGHT HIP: ICD-10-CM

## 2024-08-23 PROCEDURE — 99213 OFFICE O/P EST LOW 20 MIN: CPT

## 2024-08-23 RX ORDER — OXYCODONE AND ACETAMINOPHEN 5; 325 MG/1; MG/1
5-325 TABLET ORAL
Qty: 14 | Refills: 0 | Status: ACTIVE | COMMUNITY
Start: 2024-08-23 | End: 1900-01-01

## 2024-08-23 RX ORDER — METHYLPREDNISOLONE 4 MG/1
4 TABLET ORAL
Qty: 1 | Refills: 0 | Status: ACTIVE | COMMUNITY
Start: 2024-08-23 | End: 1900-01-01

## 2024-08-23 NOTE — DISCUSSION/SUMMARY
[de-identified] : Impression: Right hip osteoarthritis  Plan: Patient was advised for Medrol Dosepak to help him with the severe pain, prescription was sent to the pharmacy. Prescription for Percocet, 14 tablets were given for breakthrough pain. At this time I also recommended for intra-articular injection with interventional radiology, prescription was given  Follow-up: 6 to 8 weeks with Dr. Corrales

## 2024-08-23 NOTE — HISTORY OF PRESENT ILLNESS
[de-identified] : 74-year-old male here for repeat evaluation of pain to the right hip. Patient states that the pain on his hip is severe, is on the anterior aspect of the hip and on the groin.

## 2024-08-30 RX ORDER — OXYCODONE AND ACETAMINOPHEN 5; 325 MG/1; MG/1
5-325 TABLET ORAL
Qty: 60 | Refills: 0 | Status: ACTIVE | COMMUNITY
Start: 2024-08-30 | End: 1900-01-01

## 2024-09-09 ENCOUNTER — OUTPATIENT (OUTPATIENT)
Dept: OUTPATIENT SERVICES | Facility: HOSPITAL | Age: 74
LOS: 1 days | End: 2024-09-09
Payer: MEDICARE

## 2024-09-09 ENCOUNTER — RESULT REVIEW (OUTPATIENT)
Age: 74
End: 2024-09-09

## 2024-09-09 DIAGNOSIS — Z98.890 OTHER SPECIFIED POSTPROCEDURAL STATES: Chronic | ICD-10-CM

## 2024-09-09 DIAGNOSIS — Z00.8 ENCOUNTER FOR OTHER GENERAL EXAMINATION: ICD-10-CM

## 2024-09-09 DIAGNOSIS — M16.11 UNILATERAL PRIMARY OSTEOARTHRITIS, RIGHT HIP: ICD-10-CM

## 2024-09-09 PROCEDURE — 27093 INJECTION FOR HIP X-RAY: CPT | Mod: RT

## 2024-09-09 PROCEDURE — 73525 CONTRAST X-RAY OF HIP: CPT | Mod: RT

## 2024-09-09 PROCEDURE — 73525 CONTRAST X-RAY OF HIP: CPT | Mod: 26,RT

## 2024-09-10 DIAGNOSIS — M16.11 UNILATERAL PRIMARY OSTEOARTHRITIS, RIGHT HIP: ICD-10-CM

## 2024-09-11 NOTE — PATIENT PROFILE ADULT - HAS THE PATIENT RECEIVED THE INFLUENZA VACCINE THIS SEASON?
BREAST PROCEDURE - POST CARE INSTRUCTIONS    Procedure:  Right Stereotactically Guided Core Biopsy  performed by Dr. Hiram Esquivel  .      You may notice some tenderness,bruising or discoloration around the site of your procedure.  This is normal and should disappear gradually over the next 7 to 10 days.  You may develop firmness at the biopsy site that is tender to the touch.  An area that is small (e.g. the size of a marble) is normal bruising and will gradually go away.  If you notice a firm area that is larger (e.g. the size of an egg or bigger), please contact your physician.    If you notice excessive bleeding (bleeding that you cannot control with 15 minutes of direct, continuous, firm pressure), redness, heat, drainage or have severe pain from the procedure site, please contact the physician that ordered your procedure.  If you are unable to reach your ordering physician, please contact: Urgent Care.    Day of the Procedure - Breast Biopsy    · Wear a good, supportive bra for the day and night of the procedure.  If your breast is still sore after this time frame, continue to wear a supportive bra at night.  · Place an ice pack inside your bra on top of the dressing for 20 minutes every hour until bedtime.  · You may take Tylenol (acetaminophen) for discomfort.   · DO NOT take aspirin, ibuprofen, Advil, Motrin, naproxen sodium or Aleve for at least 24 hours after the procedure unless specifically approved by your physician.  · DO NOT participate in strenuous activity for at least 24 hours after your procedure (sports, exercise, heavy lifting).  Do not lift more than 10 pounds with the biopsy side (approximately equal to one gallon of milk) for 48 hours.  · Avoid getting the procedure area wet for at least 24 hours.  You may sponge bathe if needed.    Continued Care    · Remove the outer dressing if it becomes blood stained and apply a new bandage or clean dressing over the steri-strips.  You may remove the outer  dressing the day after your procedure.  Do not remove the steri-strips.  They will fall off on their own in about a week.   · You may take a shower or tub bath with the steri-strips in place.  It is okay to get the steri-strips wet, but do not scrub the area.  If the edges curl up, you may trim them with a scissors.  You can use a band aid if you are having any drainage or a steri-strip falls off.  · You may resume most normal activities the next day.  However, please avoid swimming and hot tub use until the incision is healed.    Results    · You will be notified of your procedure results by your ordering Provider within 2 business days.  · Your mammogram for marker/clip placement after your breast biopsy showed the breast marker is in the desired position..     yes... 4 = No assist / stand by assistance

## 2024-09-17 ENCOUNTER — APPOINTMENT (OUTPATIENT)
Dept: ORTHOPEDIC SURGERY | Facility: CLINIC | Age: 74
End: 2024-09-17
Payer: MEDICARE

## 2024-09-17 DIAGNOSIS — M16.11 UNILATERAL PRIMARY OSTEOARTHRITIS, RIGHT HIP: ICD-10-CM

## 2024-09-17 PROCEDURE — 99214 OFFICE O/P EST MOD 30 MIN: CPT

## 2024-09-17 PROCEDURE — 73522 X-RAY EXAM HIPS BI 3-4 VIEWS: CPT

## 2024-09-17 RX ORDER — IBUPROFEN 800 MG/1
800 TABLET, FILM COATED ORAL
Qty: 60 | Refills: 0 | Status: ACTIVE | COMMUNITY
Start: 2024-09-17 | End: 1900-01-01

## 2024-09-17 NOTE — ASSESSMENT
[FreeTextEntry1] : 74-year-old gentleman with advanced right hip arthritis is failed nonoperative management with use of NSAIDs cane use physical therapy and a cortisone injection.  Last cortisone injection September 9, 2024.  We talked about total hip replacement.  Talked about the risk benefits and alternatives.  Talked about the perioperative course and associated risks of surgery.  Patient would like to proceed.  Because of his cortisone injection and he is not allowed to receive his total hip for at least 90 days from his injection.  Will make arrangements to accommodate his request.

## 2024-09-17 NOTE — IMAGING
[de-identified] : Animated pleasant late middle-aged gentleman sits in the office in no distress.  He is accompanied by his wife.  Physical examination: Right hip: Tenderness palpation about inguinal crease in the area of his hip joint.  He has pain with forced internal and external rotation hip joint at full extension.  Less so at 90 degrees of flexion.  There were no discrete lymph nodes in the inguinal crease.  Radiographs Right hip (AP pelvis, frog lateral): Advanced right hip arthritis with loss of the right hip joint space.  Habitus obscures examination somewhat.

## 2024-09-17 NOTE — HISTORY OF PRESENT ILLNESS
[de-identified] : 74-year-old gentleman presents his office for evaluation of right hip arthritis.  Previously seen with enrolled in physical therapy program.  Still had pain he was seen by my PA who sent him for a cortisone injection.  This did not provide her with significant relief of symptoms.  Pain occurs with walking and standing and is localized to his groin without significant radiation.  He utilizes a cane to ambulate.  He is interested in total hip surgery.  He had his cortisone injection on September 9, 2024.

## 2024-09-20 ENCOUNTER — NON-APPOINTMENT (OUTPATIENT)
Age: 74
End: 2024-09-20

## 2024-09-24 ENCOUNTER — NON-APPOINTMENT (OUTPATIENT)
Age: 74
End: 2024-09-24

## 2024-10-11 ENCOUNTER — APPOINTMENT (OUTPATIENT)
Dept: ORTHOPEDIC SURGERY | Facility: CLINIC | Age: 74
End: 2024-10-11

## 2024-11-05 ENCOUNTER — RX RENEWAL (OUTPATIENT)
Age: 74
End: 2024-11-05

## 2024-12-03 ENCOUNTER — OUTPATIENT (OUTPATIENT)
Dept: OUTPATIENT SERVICES | Facility: HOSPITAL | Age: 74
LOS: 1 days | End: 2024-12-03
Payer: MEDICARE

## 2024-12-03 ENCOUNTER — RESULT REVIEW (OUTPATIENT)
Age: 74
End: 2024-12-03

## 2024-12-03 VITALS
WEIGHT: 235.01 LBS | OXYGEN SATURATION: 98 % | RESPIRATION RATE: 16 BRPM | SYSTOLIC BLOOD PRESSURE: 106 MMHG | HEART RATE: 82 BPM | HEIGHT: 65 IN | TEMPERATURE: 98 F | DIASTOLIC BLOOD PRESSURE: 70 MMHG

## 2024-12-03 DIAGNOSIS — Z98.890 OTHER SPECIFIED POSTPROCEDURAL STATES: Chronic | ICD-10-CM

## 2024-12-03 DIAGNOSIS — M16.11 UNILATERAL PRIMARY OSTEOARTHRITIS, RIGHT HIP: ICD-10-CM

## 2024-12-03 DIAGNOSIS — Z01.818 ENCOUNTER FOR OTHER PREPROCEDURAL EXAMINATION: ICD-10-CM

## 2024-12-03 LAB
A1C WITH ESTIMATED AVERAGE GLUCOSE RESULT: 6.1 % — HIGH (ref 4–5.6)
ALBUMIN SERPL ELPH-MCNC: 5 G/DL — SIGNIFICANT CHANGE UP (ref 3.5–5.2)
ALP SERPL-CCNC: 88 U/L — SIGNIFICANT CHANGE UP (ref 30–115)
ALT FLD-CCNC: 32 U/L — SIGNIFICANT CHANGE UP (ref 0–41)
ANION GAP SERPL CALC-SCNC: 15 MMOL/L — HIGH (ref 7–14)
APTT BLD: 32.9 SEC — SIGNIFICANT CHANGE UP (ref 27–39.2)
AST SERPL-CCNC: 24 U/L — SIGNIFICANT CHANGE UP (ref 0–41)
BASOPHILS # BLD AUTO: 0.12 K/UL — SIGNIFICANT CHANGE UP (ref 0–0.2)
BASOPHILS NFR BLD AUTO: 1.2 % — HIGH (ref 0–1)
BILIRUB SERPL-MCNC: 0.3 MG/DL — SIGNIFICANT CHANGE UP (ref 0.2–1.2)
BLD GP AB SCN SERPL QL: SIGNIFICANT CHANGE UP
BUN SERPL-MCNC: 40 MG/DL — HIGH (ref 10–20)
CALCIUM SERPL-MCNC: 10.9 MG/DL — HIGH (ref 8.4–10.5)
CHLORIDE SERPL-SCNC: 102 MMOL/L — SIGNIFICANT CHANGE UP (ref 98–110)
CO2 SERPL-SCNC: 21 MMOL/L — SIGNIFICANT CHANGE UP (ref 17–32)
CREAT SERPL-MCNC: 1.3 MG/DL — SIGNIFICANT CHANGE UP (ref 0.7–1.5)
EGFR: 58 ML/MIN/1.73M2 — LOW
EOSINOPHIL # BLD AUTO: 0.44 K/UL — SIGNIFICANT CHANGE UP (ref 0–0.7)
EOSINOPHIL NFR BLD AUTO: 4.5 % — SIGNIFICANT CHANGE UP (ref 0–8)
ESTIMATED AVERAGE GLUCOSE: 128 MG/DL — HIGH (ref 68–114)
GLUCOSE SERPL-MCNC: 118 MG/DL — HIGH (ref 70–99)
HCT VFR BLD CALC: 38.7 % — LOW (ref 42–52)
HGB BLD-MCNC: 13.1 G/DL — LOW (ref 14–18)
IMM GRANULOCYTES NFR BLD AUTO: 0.5 % — HIGH (ref 0.1–0.3)
INR BLD: 0.89 RATIO — SIGNIFICANT CHANGE UP (ref 0.65–1.3)
LYMPHOCYTES # BLD AUTO: 1.96 K/UL — SIGNIFICANT CHANGE UP (ref 1.2–3.4)
LYMPHOCYTES # BLD AUTO: 20.1 % — LOW (ref 20.5–51.1)
MCHC RBC-ENTMCNC: 30.3 PG — SIGNIFICANT CHANGE UP (ref 27–31)
MCHC RBC-ENTMCNC: 33.9 G/DL — SIGNIFICANT CHANGE UP (ref 32–37)
MCV RBC AUTO: 89.6 FL — SIGNIFICANT CHANGE UP (ref 80–94)
MONOCYTES # BLD AUTO: 0.71 K/UL — HIGH (ref 0.1–0.6)
MONOCYTES NFR BLD AUTO: 7.3 % — SIGNIFICANT CHANGE UP (ref 1.7–9.3)
MRSA PCR RESULT.: NEGATIVE — SIGNIFICANT CHANGE UP
NEUTROPHILS # BLD AUTO: 6.48 K/UL — SIGNIFICANT CHANGE UP (ref 1.4–6.5)
NEUTROPHILS NFR BLD AUTO: 66.4 % — SIGNIFICANT CHANGE UP (ref 42.2–75.2)
NRBC # BLD: 0 /100 WBCS — SIGNIFICANT CHANGE UP (ref 0–0)
PLATELET # BLD AUTO: 336 K/UL — SIGNIFICANT CHANGE UP (ref 130–400)
PMV BLD: 11.1 FL — HIGH (ref 7.4–10.4)
POTASSIUM SERPL-MCNC: 5.6 MMOL/L — HIGH (ref 3.5–5)
POTASSIUM SERPL-SCNC: 5.6 MMOL/L — HIGH (ref 3.5–5)
PROT SERPL-MCNC: 7.8 G/DL — SIGNIFICANT CHANGE UP (ref 6–8)
PROTHROM AB SERPL-ACNC: 10.5 SEC — SIGNIFICANT CHANGE UP (ref 9.95–12.87)
RBC # BLD: 4.32 M/UL — LOW (ref 4.7–6.1)
RBC # FLD: 13 % — SIGNIFICANT CHANGE UP (ref 11.5–14.5)
SODIUM SERPL-SCNC: 138 MMOL/L — SIGNIFICANT CHANGE UP (ref 135–146)
WBC # BLD: 9.76 K/UL — SIGNIFICANT CHANGE UP (ref 4.8–10.8)
WBC # FLD AUTO: 9.76 K/UL — SIGNIFICANT CHANGE UP (ref 4.8–10.8)

## 2024-12-03 PROCEDURE — 36415 COLL VENOUS BLD VENIPUNCTURE: CPT

## 2024-12-03 PROCEDURE — 86900 BLOOD TYPING SEROLOGIC ABO: CPT

## 2024-12-03 PROCEDURE — 85730 THROMBOPLASTIN TIME PARTIAL: CPT

## 2024-12-03 PROCEDURE — 83036 HEMOGLOBIN GLYCOSYLATED A1C: CPT

## 2024-12-03 PROCEDURE — 85025 COMPLETE CBC W/AUTO DIFF WBC: CPT

## 2024-12-03 PROCEDURE — 87640 STAPH A DNA AMP PROBE: CPT

## 2024-12-03 PROCEDURE — 80053 COMPREHEN METABOLIC PANEL: CPT

## 2024-12-03 PROCEDURE — 87641 MR-STAPH DNA AMP PROBE: CPT

## 2024-12-03 PROCEDURE — 73502 X-RAY EXAM HIP UNI 2-3 VIEWS: CPT | Mod: RT

## 2024-12-03 PROCEDURE — 73502 X-RAY EXAM HIP UNI 2-3 VIEWS: CPT | Mod: 26,RT

## 2024-12-03 PROCEDURE — 86850 RBC ANTIBODY SCREEN: CPT

## 2024-12-03 PROCEDURE — 99214 OFFICE O/P EST MOD 30 MIN: CPT | Mod: 25

## 2024-12-03 PROCEDURE — 86901 BLOOD TYPING SEROLOGIC RH(D): CPT

## 2024-12-03 PROCEDURE — 85610 PROTHROMBIN TIME: CPT

## 2024-12-03 NOTE — H&P PST ADULT - MUSCULOSKELETAL
details… Right Hip/ROM intact/decreased ROM due to pain/normal gait/strength 5/5 bilateral upper extremities

## 2024-12-03 NOTE — H&P PST ADULT - HISTORY OF PRESENT ILLNESS
75 yo male who presents to pretesting for the preparations of the above surgery due to severe sharp right hip pains scale 9/10  and radiating to right upper thigh and partial reliefs with pain meds.   Patient denies any cp, sob, palpitations, fever, cough, URI, abdominal pains, N/V, UTI, Rashes or open wounds.  As per patient exercise tolerance of 1/2 fos walks with out sob except right hip pains   Patient denies any s/s covid 19 and reports no contact with known positive people.   Anesthesia Alert  NO--Difficult Airway  NO--History of neck surgery or radiation  NO--Limited ROM of neck  NO--History of Malignant hyperthermia  NO--Personal or family history of Pseudocholinesterase deficiency  NO--Prior Anesthesia Complication  NO--Latex Allergy  NO--Loose teeth. Bottom Implants   NO--History of Rheumatoid Arthritis  NO--DIVYA  NO--Risk of bleedings   Pt instructed to stop vitamins/supplements/herbal medications for one week prior to surgery  As per patient this is the complete medical, surgical history and medications.  [facility] Activity Status Index (DASI) from [facility]  on 12/3/2024  ** All calculations should be rechecked by clinician prior to use **  RESULT SUMMARY:  10 points  The higher the score (maximum 58.2), the higher the functional status.  3.97 METs  INPUTS:  Take care of self —> 2.75 = Yes  Walk indoors —> 1.75 = Yes  Walk 1&ndash;2 blocks on level ground —> 0 = No  Climb a flight of stairs or walk up a hill —> 5.5 = Yes  Run a short distance —> 0 = No  Do light work around the house —> 0 = No  Do moderate work around the house —> 0 = No  Do heavy work around the house —> 0 = No  Do yardwork —> 0 = No  Have sexual relations —> 0 = No  Participate in moderate recreational activities —> 0 = No  Participate in strenuous sports —> 0 = No  Revised Cardiac Risk Index for Pre-Operative Risk from [facility]  on 12/3/2024  ** All calculations should be rechecked by clinician prior to use **    RESULT SUMMARY:  2 points  RCRI Score  10.1 %  Risk of major cardiac event  INPUTS:  High-risk surgery —> 1 = Yes  History of ischemic heart disease —> 0 = No  History of congestive heart failure —> 0 = No  History of cerebrovascular disease —> 1 = Yes  Pre-operative treatment with insulin —> 0 = No  Pre-operative creatinine >2 mg/dL / 176.8 µmol/L —> 0 = No  Opioid Risk Assessment Tool (Male)       Family history of substance abuse            Alcohol (3)            Illegal Drugs (3)            Prescription drugs (4)       Personal history of substance abuse            Alcohol (3)            Illegal Drugs (4)            Prescription drugs (5)       Age between 16-45 (1)       History of preadolescent sexual abuse (0)       Psychological disease (ADD, ADHD, OCD, Bipolar Disorder, Schizophrenia, Depression) (1)    Scoring Totals:  Low Risk (0-3)  Moderate Risk (4-7)  High Risk (>/=8)

## 2024-12-03 NOTE — H&P PST ADULT - NSICDXFAMILYHX_GEN_ALL_CORE_FT
FAMILY HISTORY:  Mother  Still living? Yes, Estimated age: Age Unknown  Family history of stroke, Age at diagnosis: Age Unknown  FH: breast cancer, Age at diagnosis: Age Unknown

## 2024-12-03 NOTE — H&P PST ADULT - NSICDXPASTSURGICALHX_GEN_ALL_CORE_FT
PAST SURGICAL HISTORY:  History of esophagogastroduodenoscopy (EGD)     S/P trigger finger release     Status post cataract extraction and insertion of intraocular lens of right eye

## 2024-12-03 NOTE — H&P PST ADULT - NSICDXPASTMEDICALHX_GEN_ALL_CORE_FT
PAST MEDICAL HISTORY:  Current smoker     ETOH abuse     H/O foreign body aspiration     High blood cholesterol     History of repair of hip fracture non surgical    Hypertension     Lacunar infarction     Major depression

## 2024-12-04 DIAGNOSIS — M16.11 UNILATERAL PRIMARY OSTEOARTHRITIS, RIGHT HIP: ICD-10-CM

## 2024-12-04 DIAGNOSIS — Z01.818 ENCOUNTER FOR OTHER PREPROCEDURAL EXAMINATION: ICD-10-CM

## 2024-12-05 ENCOUNTER — OUTPATIENT (OUTPATIENT)
Dept: OUTPATIENT SERVICES | Facility: HOSPITAL | Age: 74
LOS: 1 days | End: 2024-12-05
Payer: MEDICARE

## 2024-12-05 DIAGNOSIS — Z98.890 OTHER SPECIFIED POSTPROCEDURAL STATES: Chronic | ICD-10-CM

## 2024-12-05 DIAGNOSIS — Z02.9 ENCOUNTER FOR ADMINISTRATIVE EXAMINATIONS, UNSPECIFIED: ICD-10-CM

## 2024-12-05 PROBLEM — Z87.898 PERSONAL HISTORY OF OTHER SPECIFIED CONDITIONS: Chronic | Status: ACTIVE | Noted: 2024-12-03

## 2024-12-05 PROBLEM — F17.200 NICOTINE DEPENDENCE, UNSPECIFIED, UNCOMPLICATED: Chronic | Status: ACTIVE | Noted: 2024-12-03

## 2024-12-05 LAB
ANION GAP SERPL CALC-SCNC: 13 MMOL/L — SIGNIFICANT CHANGE UP (ref 7–14)
BUN SERPL-MCNC: 38 MG/DL — HIGH (ref 10–20)
CALCIUM SERPL-MCNC: 10.2 MG/DL — SIGNIFICANT CHANGE UP (ref 8.4–10.5)
CHLORIDE SERPL-SCNC: 100 MMOL/L — SIGNIFICANT CHANGE UP (ref 98–110)
CO2 SERPL-SCNC: 20 MMOL/L — SIGNIFICANT CHANGE UP (ref 17–32)
CREAT SERPL-MCNC: 1.4 MG/DL — SIGNIFICANT CHANGE UP (ref 0.7–1.5)
EGFR: 53 ML/MIN/1.73M2 — LOW
GLUCOSE SERPL-MCNC: 109 MG/DL — HIGH (ref 70–99)
POTASSIUM SERPL-MCNC: 5.1 MMOL/L — HIGH (ref 3.5–5)
POTASSIUM SERPL-SCNC: 5.1 MMOL/L — HIGH (ref 3.5–5)
SODIUM SERPL-SCNC: 133 MMOL/L — LOW (ref 135–146)

## 2024-12-05 PROCEDURE — 80048 BASIC METABOLIC PNL TOTAL CA: CPT

## 2024-12-05 PROCEDURE — 36415 COLL VENOUS BLD VENIPUNCTURE: CPT

## 2024-12-06 DIAGNOSIS — Z02.9 ENCOUNTER FOR ADMINISTRATIVE EXAMINATIONS, UNSPECIFIED: ICD-10-CM

## 2024-12-12 ENCOUNTER — RESULT REVIEW (OUTPATIENT)
Age: 74
End: 2024-12-12

## 2024-12-12 ENCOUNTER — APPOINTMENT (OUTPATIENT)
Dept: ORTHOPEDIC SURGERY | Facility: HOSPITAL | Age: 74
End: 2024-12-12

## 2024-12-12 ENCOUNTER — INPATIENT (INPATIENT)
Facility: HOSPITAL | Age: 74
LOS: 1 days | Discharge: HOME | DRG: 470 | End: 2024-12-14
Attending: ORTHOPAEDIC SURGERY | Admitting: ORTHOPAEDIC SURGERY
Payer: MEDICARE

## 2024-12-12 ENCOUNTER — TRANSCRIPTION ENCOUNTER (OUTPATIENT)
Age: 74
End: 2024-12-12

## 2024-12-12 VITALS
HEIGHT: 65 IN | DIASTOLIC BLOOD PRESSURE: 80 MMHG | WEIGHT: 235.01 LBS | RESPIRATION RATE: 17 BRPM | TEMPERATURE: 98 F | HEART RATE: 77 BPM | SYSTOLIC BLOOD PRESSURE: 145 MMHG | OXYGEN SATURATION: 98 %

## 2024-12-12 DIAGNOSIS — Z98.890 OTHER SPECIFIED POSTPROCEDURAL STATES: Chronic | ICD-10-CM

## 2024-12-12 DIAGNOSIS — M16.11 UNILATERAL PRIMARY OSTEOARTHRITIS, RIGHT HIP: ICD-10-CM

## 2024-12-12 LAB — GLUCOSE BLDC GLUCOMTR-MCNC: 107 MG/DL — HIGH (ref 70–99)

## 2024-12-12 PROCEDURE — 73501 X-RAY EXAM HIP UNI 1 VIEW: CPT | Mod: RT

## 2024-12-12 PROCEDURE — C1776: CPT

## 2024-12-12 PROCEDURE — 97116 GAIT TRAINING THERAPY: CPT | Mod: GP

## 2024-12-12 PROCEDURE — 36415 COLL VENOUS BLD VENIPUNCTURE: CPT

## 2024-12-12 PROCEDURE — 88311 DECALCIFY TISSUE: CPT

## 2024-12-12 PROCEDURE — 97162 PT EVAL MOD COMPLEX 30 MIN: CPT | Mod: GP

## 2024-12-12 PROCEDURE — C1889: CPT

## 2024-12-12 PROCEDURE — 88305 TISSUE EXAM BY PATHOLOGIST: CPT | Mod: 26

## 2024-12-12 PROCEDURE — 97535 SELF CARE MNGMENT TRAINING: CPT | Mod: GO

## 2024-12-12 PROCEDURE — 97165 OT EVAL LOW COMPLEX 30 MIN: CPT | Mod: GO

## 2024-12-12 PROCEDURE — 80048 BASIC METABOLIC PNL TOTAL CA: CPT

## 2024-12-12 PROCEDURE — 27130 TOTAL HIP ARTHROPLASTY: CPT | Mod: RT

## 2024-12-12 PROCEDURE — 94010 BREATHING CAPACITY TEST: CPT

## 2024-12-12 PROCEDURE — 88311 DECALCIFY TISSUE: CPT | Mod: 26

## 2024-12-12 PROCEDURE — 85027 COMPLETE CBC AUTOMATED: CPT

## 2024-12-12 PROCEDURE — 97110 THERAPEUTIC EXERCISES: CPT | Mod: GP

## 2024-12-12 PROCEDURE — 88305 TISSUE EXAM BY PATHOLOGIST: CPT

## 2024-12-12 RX ORDER — KETOROLAC TROMETHAMINE 30 MG/ML
15 INJECTION, SOLUTION INTRAMUSCULAR; INTRAVENOUS ONCE
Refills: 0 | Status: DISCONTINUED | OUTPATIENT
Start: 2024-12-12 | End: 2024-12-12

## 2024-12-12 RX ORDER — HYDROMORPHONE/SOD CHLOR,ISO/PF 2 MG/10 ML
0.5 SYRINGE (ML) INJECTION
Refills: 0 | Status: DISCONTINUED | OUTPATIENT
Start: 2024-12-12 | End: 2024-12-12

## 2024-12-12 RX ORDER — SENNA 187 MG
2 TABLET ORAL AT BEDTIME
Refills: 0 | Status: DISCONTINUED | OUTPATIENT
Start: 2024-12-12 | End: 2024-12-14

## 2024-12-12 RX ORDER — ACETAMINOPHEN 500 MG/5ML
650 LIQUID (ML) ORAL EVERY 6 HOURS
Refills: 0 | Status: DISCONTINUED | OUTPATIENT
Start: 2024-12-12 | End: 2024-12-14

## 2024-12-12 RX ORDER — ASPIRIN 325 MG
81 TABLET ORAL EVERY 12 HOURS
Refills: 0 | Status: DISCONTINUED | OUTPATIENT
Start: 2024-12-12 | End: 2024-12-14

## 2024-12-12 RX ORDER — POLYETHYLENE GLYCOL 3350 17 G/17G
17 POWDER, FOR SOLUTION ORAL
Qty: 0 | Refills: 0 | DISCHARGE
Start: 2024-12-12

## 2024-12-12 RX ORDER — ALPRAZOLAM 0.5 MG
0.25 TABLET, EXTENDED RELEASE 24 HR ORAL ONCE
Refills: 0 | Status: DISCONTINUED | OUTPATIENT
Start: 2024-12-12 | End: 2024-12-12

## 2024-12-12 RX ORDER — TRAMADOL HYDROCHLORIDE 50 MG/1
50 TABLET, FILM COATED ORAL EVERY 4 HOURS
Refills: 0 | Status: DISCONTINUED | OUTPATIENT
Start: 2024-12-12 | End: 2024-12-14

## 2024-12-12 RX ORDER — SENNA 187 MG
2 TABLET ORAL
Qty: 30 | Refills: 0
Start: 2024-12-12

## 2024-12-12 RX ORDER — ONDANSETRON HCL/PF 4 MG/2 ML
4 VIAL (ML) INJECTION EVERY 6 HOURS
Refills: 0 | Status: DISCONTINUED | OUTPATIENT
Start: 2024-12-12 | End: 2024-12-14

## 2024-12-12 RX ORDER — DEXAMETHASONE 0.5 MG/1
2 TABLET ORAL ONCE
Refills: 0 | Status: COMPLETED | OUTPATIENT
Start: 2024-12-13 | End: 2024-12-13

## 2024-12-12 RX ORDER — SODIUM CHLORIDE 9 G/1000ML
1000 INJECTION, SOLUTION INTRAVENOUS
Refills: 0 | Status: DISCONTINUED | OUTPATIENT
Start: 2024-12-12 | End: 2024-12-12

## 2024-12-12 RX ORDER — POLYETHYLENE GLYCOL 3350 17 G/17G
17 POWDER, FOR SOLUTION ORAL AT BEDTIME
Refills: 0 | Status: DISCONTINUED | OUTPATIENT
Start: 2024-12-12 | End: 2024-12-14

## 2024-12-12 RX ORDER — ACETAMINOPHEN 500 MG/5ML
2 LIQUID (ML) ORAL
Qty: 112 | Refills: 0
Start: 2024-12-12 | End: 2024-12-25

## 2024-12-12 RX ORDER — KETOROLAC TROMETHAMINE 30 MG/ML
15 INJECTION, SOLUTION INTRAMUSCULAR; INTRAVENOUS EVERY 6 HOURS
Refills: 0 | Status: DISCONTINUED | OUTPATIENT
Start: 2024-12-12 | End: 2024-12-13

## 2024-12-12 RX ORDER — NALOXONE HYDROCHLORIDE 0.4 MG/ML
1 INJECTION, SOLUTION INTRAMUSCULAR; INTRAVENOUS; SUBCUTANEOUS
Qty: 1 | Refills: 0
Start: 2024-12-12

## 2024-12-12 RX ORDER — ASPIRIN 325 MG
1 TABLET ORAL
Qty: 60 | Refills: 0
Start: 2024-12-12 | End: 2025-01-10

## 2024-12-12 RX ORDER — MAGNESIUM HYDROXIDE 400 MG/5ML
30 SUSPENSION ORAL DAILY
Refills: 0 | Status: DISCONTINUED | OUTPATIENT
Start: 2024-12-12 | End: 2024-12-14

## 2024-12-12 RX ORDER — TRAMADOL HYDROCHLORIDE 50 MG/1
1 TABLET, FILM COATED ORAL
Qty: 30 | Refills: 0
Start: 2024-12-12

## 2024-12-12 RX ORDER — ATORVASTATIN CALCIUM 80 MG/1
40 TABLET, FILM COATED ORAL AT BEDTIME
Refills: 0 | Status: DISCONTINUED | OUTPATIENT
Start: 2024-12-12 | End: 2024-12-14

## 2024-12-12 RX ORDER — AMLODIPINE BESYLATE 10 MG/1
10 TABLET ORAL DAILY
Refills: 0 | Status: DISCONTINUED | OUTPATIENT
Start: 2024-12-13 | End: 2024-12-14

## 2024-12-12 RX ORDER — ACETAMINOPHEN 500 MG/5ML
1000 LIQUID (ML) ORAL ONCE
Refills: 0 | Status: COMPLETED | OUTPATIENT
Start: 2024-12-12 | End: 2024-12-12

## 2024-12-12 RX ORDER — MAGNESIUM, ALUMINUM HYDROXIDE 200-200 MG
30 TABLET,CHEWABLE ORAL
Refills: 0 | Status: DISCONTINUED | OUTPATIENT
Start: 2024-12-12 | End: 2024-12-14

## 2024-12-12 RX ORDER — LISINOPRIL 5 MG/1
10 TABLET ORAL DAILY
Refills: 0 | Status: DISCONTINUED | OUTPATIENT
Start: 2024-12-13 | End: 2024-12-14

## 2024-12-12 RX ORDER — CEFAZOLIN SODIUM IN 0.9 % NACL 3 G/100 ML
2000 INTRAVENOUS SOLUTION, PIGGYBACK (ML) INTRAVENOUS EVERY 8 HOURS
Refills: 0 | Status: COMPLETED | OUTPATIENT
Start: 2024-12-12 | End: 2024-12-12

## 2024-12-12 RX ADMIN — Medication 0.5 MILLIGRAM(S): at 11:52

## 2024-12-12 RX ADMIN — KETOROLAC TROMETHAMINE 15 MILLIGRAM(S): 30 INJECTION, SOLUTION INTRAMUSCULAR; INTRAVENOUS at 17:17

## 2024-12-12 RX ADMIN — Medication 0.5 MILLIGRAM(S): at 11:08

## 2024-12-12 RX ADMIN — Medication 81 MILLIGRAM(S): at 17:17

## 2024-12-12 RX ADMIN — Medication 650 MILLIGRAM(S): at 11:53

## 2024-12-12 RX ADMIN — Medication 0.5 MILLIGRAM(S): at 11:18

## 2024-12-12 RX ADMIN — POLYETHYLENE GLYCOL 3350 17 GRAM(S): 17 POWDER, FOR SOLUTION ORAL at 20:10

## 2024-12-12 RX ADMIN — ATORVASTATIN CALCIUM 40 MILLIGRAM(S): 80 TABLET, FILM COATED ORAL at 20:11

## 2024-12-12 RX ADMIN — Medication 0.5 MILLIGRAM(S): at 11:03

## 2024-12-12 RX ADMIN — Medication 0.5 MILLIGRAM(S): at 11:02

## 2024-12-12 RX ADMIN — Medication 0.5 MILLIGRAM(S): at 12:10

## 2024-12-12 RX ADMIN — KETOROLAC TROMETHAMINE 15 MILLIGRAM(S): 30 INJECTION, SOLUTION INTRAMUSCULAR; INTRAVENOUS at 13:40

## 2024-12-12 RX ADMIN — KETOROLAC TROMETHAMINE 15 MILLIGRAM(S): 30 INJECTION, SOLUTION INTRAMUSCULAR; INTRAVENOUS at 18:32

## 2024-12-12 RX ADMIN — Medication 650 MILLIGRAM(S): at 23:00

## 2024-12-12 RX ADMIN — Medication 100 MILLIGRAM(S): at 23:01

## 2024-12-12 RX ADMIN — SODIUM CHLORIDE 75 MILLILITER(S): 9 INJECTION, SOLUTION INTRAVENOUS at 11:07

## 2024-12-12 RX ADMIN — Medication 650 MILLIGRAM(S): at 12:24

## 2024-12-12 RX ADMIN — KETOROLAC TROMETHAMINE 15 MILLIGRAM(S): 30 INJECTION, SOLUTION INTRAMUSCULAR; INTRAVENOUS at 13:25

## 2024-12-12 RX ADMIN — Medication 0.5 MILLIGRAM(S): at 10:57

## 2024-12-12 RX ADMIN — Medication 40 MILLIGRAM(S): at 21:47

## 2024-12-12 RX ADMIN — Medication 2 TABLET(S): at 20:10

## 2024-12-12 RX ADMIN — TRAMADOL HYDROCHLORIDE 50 MILLIGRAM(S): 50 TABLET, FILM COATED ORAL at 12:25

## 2024-12-12 RX ADMIN — KETOROLAC TROMETHAMINE 15 MILLIGRAM(S): 30 INJECTION, SOLUTION INTRAMUSCULAR; INTRAVENOUS at 23:00

## 2024-12-12 RX ADMIN — Medication 100 MILLIGRAM(S): at 15:43

## 2024-12-12 RX ADMIN — Medication 1000 MILLIGRAM(S): at 06:28

## 2024-12-12 RX ADMIN — TRAMADOL HYDROCHLORIDE 50 MILLIGRAM(S): 50 TABLET, FILM COATED ORAL at 21:05

## 2024-12-12 RX ADMIN — TRAMADOL HYDROCHLORIDE 50 MILLIGRAM(S): 50 TABLET, FILM COATED ORAL at 11:53

## 2024-12-12 RX ADMIN — TRAMADOL HYDROCHLORIDE 50 MILLIGRAM(S): 50 TABLET, FILM COATED ORAL at 20:10

## 2024-12-12 RX ADMIN — Medication 1000 MILLIGRAM(S): at 06:53

## 2024-12-12 RX ADMIN — Medication 0.5 MILLIGRAM(S): at 11:22

## 2024-12-13 PROBLEM — E66.9 OBESITY, UNSPECIFIED: Chronic | Status: ACTIVE | Noted: 2024-12-05

## 2024-12-13 LAB
ANION GAP SERPL CALC-SCNC: 12 MMOL/L — SIGNIFICANT CHANGE UP (ref 7–14)
BUN SERPL-MCNC: 45 MG/DL — HIGH (ref 10–20)
CALCIUM SERPL-MCNC: 8.7 MG/DL — SIGNIFICANT CHANGE UP (ref 8.4–10.5)
CHLORIDE SERPL-SCNC: 99 MMOL/L — SIGNIFICANT CHANGE UP (ref 98–110)
CO2 SERPL-SCNC: 20 MMOL/L — SIGNIFICANT CHANGE UP (ref 17–32)
CREAT SERPL-MCNC: 1.8 MG/DL — HIGH (ref 0.7–1.5)
EGFR: 40 ML/MIN/1.73M2 — LOW
EGFR: 40 ML/MIN/1.73M2 — LOW
GLUCOSE SERPL-MCNC: 111 MG/DL — HIGH (ref 70–99)
HCT VFR BLD CALC: 28.5 % — LOW (ref 42–52)
HGB BLD-MCNC: 9.5 G/DL — LOW (ref 14–18)
MCHC RBC-ENTMCNC: 29.8 PG — SIGNIFICANT CHANGE UP (ref 27–31)
MCHC RBC-ENTMCNC: 33.3 G/DL — SIGNIFICANT CHANGE UP (ref 32–37)
MCV RBC AUTO: 89.3 FL — SIGNIFICANT CHANGE UP (ref 80–94)
NRBC # BLD: 0 /100 WBCS — SIGNIFICANT CHANGE UP (ref 0–0)
NRBC BLD-RTO: 0 /100 WBCS — SIGNIFICANT CHANGE UP (ref 0–0)
PLATELET # BLD AUTO: 237 K/UL — SIGNIFICANT CHANGE UP (ref 130–400)
PMV BLD: 11.3 FL — HIGH (ref 7.4–10.4)
POTASSIUM SERPL-MCNC: 4.8 MMOL/L — SIGNIFICANT CHANGE UP (ref 3.5–5)
POTASSIUM SERPL-SCNC: 4.8 MMOL/L — SIGNIFICANT CHANGE UP (ref 3.5–5)
RBC # BLD: 3.19 M/UL — LOW (ref 4.7–6.1)
RBC # FLD: 12.8 % — SIGNIFICANT CHANGE UP (ref 11.5–14.5)
SODIUM SERPL-SCNC: 131 MMOL/L — LOW (ref 135–146)
WBC # BLD: 10.96 K/UL — HIGH (ref 4.8–10.8)
WBC # FLD AUTO: 10.96 K/UL — HIGH (ref 4.8–10.8)

## 2024-12-13 PROCEDURE — 99222 1ST HOSP IP/OBS MODERATE 55: CPT

## 2024-12-13 RX ORDER — TAMSULOSIN HYDROCHLORIDE 0.4 MG/1
0.4 CAPSULE ORAL DAILY
Refills: 0 | Status: DISCONTINUED | OUTPATIENT
Start: 2024-12-13 | End: 2024-12-14

## 2024-12-13 RX ORDER — TAMSULOSIN HYDROCHLORIDE 0.4 MG/1
1 CAPSULE ORAL
Qty: 15 | Refills: 0
Start: 2024-12-13 | End: 2024-12-27

## 2024-12-13 RX ORDER — SIMETHICONE 80 MG
80 TABLET,CHEWABLE ORAL ONCE
Refills: 0 | Status: COMPLETED | OUTPATIENT
Start: 2024-12-13 | End: 2024-12-13

## 2024-12-13 RX ADMIN — TRAMADOL HYDROCHLORIDE 50 MILLIGRAM(S): 50 TABLET, FILM COATED ORAL at 23:07

## 2024-12-13 RX ADMIN — Medication 650 MILLIGRAM(S): at 23:53

## 2024-12-13 RX ADMIN — Medication 40 MILLIGRAM(S): at 02:21

## 2024-12-13 RX ADMIN — Medication 650 MILLIGRAM(S): at 12:09

## 2024-12-13 RX ADMIN — Medication 2 TABLET(S): at 19:26

## 2024-12-13 RX ADMIN — TRAMADOL HYDROCHLORIDE 50 MILLIGRAM(S): 50 TABLET, FILM COATED ORAL at 19:23

## 2024-12-13 RX ADMIN — Medication 75 MILLILITER(S): at 14:21

## 2024-12-13 RX ADMIN — TRAMADOL HYDROCHLORIDE 50 MILLIGRAM(S): 50 TABLET, FILM COATED ORAL at 14:21

## 2024-12-13 RX ADMIN — Medication 650 MILLIGRAM(S): at 05:42

## 2024-12-13 RX ADMIN — Medication 650 MILLIGRAM(S): at 17:43

## 2024-12-13 RX ADMIN — LISINOPRIL 10 MILLIGRAM(S): 5 TABLET ORAL at 05:43

## 2024-12-13 RX ADMIN — Medication 650 MILLIGRAM(S): at 23:07

## 2024-12-13 RX ADMIN — ATORVASTATIN CALCIUM 40 MILLIGRAM(S): 80 TABLET, FILM COATED ORAL at 19:26

## 2024-12-13 RX ADMIN — Medication 80 MILLIGRAM(S): at 04:24

## 2024-12-13 RX ADMIN — Medication 650 MILLIGRAM(S): at 06:40

## 2024-12-13 RX ADMIN — KETOROLAC TROMETHAMINE 15 MILLIGRAM(S): 30 INJECTION, SOLUTION INTRAMUSCULAR; INTRAVENOUS at 06:23

## 2024-12-13 RX ADMIN — TRAMADOL HYDROCHLORIDE 50 MILLIGRAM(S): 50 TABLET, FILM COATED ORAL at 23:53

## 2024-12-13 RX ADMIN — Medication 650 MILLIGRAM(S): at 17:24

## 2024-12-13 RX ADMIN — TAMSULOSIN HYDROCHLORIDE 0.4 MILLIGRAM(S): 0.4 CAPSULE ORAL at 09:37

## 2024-12-13 RX ADMIN — KETOROLAC TROMETHAMINE 15 MILLIGRAM(S): 30 INJECTION, SOLUTION INTRAMUSCULAR; INTRAVENOUS at 00:00

## 2024-12-13 RX ADMIN — TRAMADOL HYDROCHLORIDE 50 MILLIGRAM(S): 50 TABLET, FILM COATED ORAL at 20:20

## 2024-12-13 RX ADMIN — Medication 650 MILLIGRAM(S): at 00:00

## 2024-12-13 RX ADMIN — Medication 81 MILLIGRAM(S): at 05:42

## 2024-12-13 RX ADMIN — TRAMADOL HYDROCHLORIDE 50 MILLIGRAM(S): 50 TABLET, FILM COATED ORAL at 15:05

## 2024-12-13 RX ADMIN — KETOROLAC TROMETHAMINE 15 MILLIGRAM(S): 30 INJECTION, SOLUTION INTRAMUSCULAR; INTRAVENOUS at 05:41

## 2024-12-13 RX ADMIN — Medication 650 MILLIGRAM(S): at 12:40

## 2024-12-13 RX ADMIN — Medication 81 MILLIGRAM(S): at 17:24

## 2024-12-14 ENCOUNTER — TRANSCRIPTION ENCOUNTER (OUTPATIENT)
Age: 74
End: 2024-12-14

## 2024-12-14 VITALS — SYSTOLIC BLOOD PRESSURE: 114 MMHG | DIASTOLIC BLOOD PRESSURE: 65 MMHG

## 2024-12-14 LAB
ANION GAP SERPL CALC-SCNC: 11 MMOL/L — SIGNIFICANT CHANGE UP (ref 7–14)
BUN SERPL-MCNC: 29 MG/DL — HIGH (ref 10–20)
CALCIUM SERPL-MCNC: 8.6 MG/DL — SIGNIFICANT CHANGE UP (ref 8.4–10.5)
CHLORIDE SERPL-SCNC: 103 MMOL/L — SIGNIFICANT CHANGE UP (ref 98–110)
CO2 SERPL-SCNC: 18 MMOL/L — SIGNIFICANT CHANGE UP (ref 17–32)
CREAT SERPL-MCNC: 1.4 MG/DL — SIGNIFICANT CHANGE UP (ref 0.7–1.5)
EGFR: 53 ML/MIN/1.73M2 — LOW
EGFR: 53 ML/MIN/1.73M2 — LOW
GLUCOSE SERPL-MCNC: 113 MG/DL — HIGH (ref 70–99)
POTASSIUM SERPL-MCNC: 4.9 MMOL/L — SIGNIFICANT CHANGE UP (ref 3.5–5)
POTASSIUM SERPL-SCNC: 4.9 MMOL/L — SIGNIFICANT CHANGE UP (ref 3.5–5)
SODIUM SERPL-SCNC: 132 MMOL/L — LOW (ref 135–146)

## 2024-12-14 RX ADMIN — TAMSULOSIN HYDROCHLORIDE 0.4 MILLIGRAM(S): 0.4 CAPSULE ORAL at 11:52

## 2024-12-14 RX ADMIN — LISINOPRIL 10 MILLIGRAM(S): 5 TABLET ORAL at 05:00

## 2024-12-14 RX ADMIN — TRAMADOL HYDROCHLORIDE 50 MILLIGRAM(S): 50 TABLET, FILM COATED ORAL at 04:30

## 2024-12-14 RX ADMIN — TRAMADOL HYDROCHLORIDE 50 MILLIGRAM(S): 50 TABLET, FILM COATED ORAL at 16:19

## 2024-12-14 RX ADMIN — Medication 650 MILLIGRAM(S): at 11:52

## 2024-12-14 RX ADMIN — Medication 650 MILLIGRAM(S): at 05:01

## 2024-12-14 RX ADMIN — Medication 650 MILLIGRAM(S): at 16:19

## 2024-12-14 RX ADMIN — Medication 40 MILLIGRAM(S): at 05:00

## 2024-12-14 RX ADMIN — Medication 650 MILLIGRAM(S): at 06:08

## 2024-12-14 RX ADMIN — TRAMADOL HYDROCHLORIDE 50 MILLIGRAM(S): 50 TABLET, FILM COATED ORAL at 11:52

## 2024-12-14 RX ADMIN — TRAMADOL HYDROCHLORIDE 50 MILLIGRAM(S): 50 TABLET, FILM COATED ORAL at 03:35

## 2024-12-14 RX ADMIN — Medication 81 MILLIGRAM(S): at 05:01

## 2024-12-16 LAB — SURGICAL PATHOLOGY STUDY: SIGNIFICANT CHANGE UP

## 2024-12-18 RX ORDER — TRAMADOL HYDROCHLORIDE 50 MG/1
50 TABLET, COATED ORAL
Qty: 14 | Refills: 0 | Status: ACTIVE | COMMUNITY
Start: 2024-12-18 | End: 1900-01-01

## 2024-12-19 DIAGNOSIS — M16.11 UNILATERAL PRIMARY OSTEOARTHRITIS, RIGHT HIP: ICD-10-CM

## 2024-12-19 DIAGNOSIS — E78.5 HYPERLIPIDEMIA, UNSPECIFIED: ICD-10-CM

## 2024-12-19 DIAGNOSIS — R33.9 RETENTION OF URINE, UNSPECIFIED: ICD-10-CM

## 2024-12-19 DIAGNOSIS — Z98.41 CATARACT EXTRACTION STATUS, RIGHT EYE: ICD-10-CM

## 2024-12-19 DIAGNOSIS — E66.9 OBESITY, UNSPECIFIED: ICD-10-CM

## 2024-12-19 DIAGNOSIS — F10.10 ALCOHOL ABUSE, UNCOMPLICATED: ICD-10-CM

## 2024-12-19 DIAGNOSIS — I10 ESSENTIAL (PRIMARY) HYPERTENSION: ICD-10-CM

## 2024-12-19 DIAGNOSIS — N17.9 ACUTE KIDNEY FAILURE, UNSPECIFIED: ICD-10-CM

## 2024-12-19 DIAGNOSIS — F17.200 NICOTINE DEPENDENCE, UNSPECIFIED, UNCOMPLICATED: ICD-10-CM

## 2024-12-25 PROBLEM — F10.90 ALCOHOL USE: Status: ACTIVE | Noted: 2022-05-09

## 2024-12-27 ENCOUNTER — APPOINTMENT (OUTPATIENT)
Dept: ORTHOPEDIC SURGERY | Facility: CLINIC | Age: 74
End: 2024-12-27

## 2025-01-02 ENCOUNTER — APPOINTMENT (OUTPATIENT)
Dept: ORTHOPEDIC SURGERY | Facility: CLINIC | Age: 75
End: 2025-01-02

## 2025-01-02 DIAGNOSIS — M16.11 UNILATERAL PRIMARY OSTEOARTHRITIS, RIGHT HIP: ICD-10-CM

## 2025-01-02 DIAGNOSIS — M25.562 PAIN IN LEFT KNEE: ICD-10-CM

## 2025-01-02 PROCEDURE — 99024 POSTOP FOLLOW-UP VISIT: CPT

## 2025-01-02 PROCEDURE — 73502 X-RAY EXAM HIP UNI 2-3 VIEWS: CPT

## 2025-02-11 ENCOUNTER — APPOINTMENT (OUTPATIENT)
Dept: ORTHOPEDIC SURGERY | Facility: CLINIC | Age: 75
End: 2025-02-11
Payer: MEDICARE

## 2025-02-11 DIAGNOSIS — M16.11 UNILATERAL PRIMARY OSTEOARTHRITIS, RIGHT HIP: ICD-10-CM

## 2025-02-11 DIAGNOSIS — M70.61 TROCHANTERIC BURSITIS, RIGHT HIP: ICD-10-CM

## 2025-02-11 PROCEDURE — 99024 POSTOP FOLLOW-UP VISIT: CPT

## 2025-02-11 PROCEDURE — 73503 X-RAY EXAM HIP UNI 4/> VIEWS: CPT | Mod: RT

## 2025-05-09 NOTE — ED PROVIDER NOTE - DATE/TIME 2
Arrived to the [facility].  Injectafer completed. Patient tolerated well.   Any issues or concerns during appointment: none.  Patient aware of next lab and [facility] office visit on 5/16/25 (date) at 12:45 (time).  Patient instructed to call provider with temperature of 100.4 or greater or nausea/vomiting/ diarrhea or pain not controlled by medications  Discharged ambulatory.     17-Jul-2020 20:52

## 2025-05-12 ENCOUNTER — APPOINTMENT (OUTPATIENT)
Dept: ORTHOPEDIC SURGERY | Facility: CLINIC | Age: 75
End: 2025-05-12
Payer: MEDICARE

## 2025-05-12 DIAGNOSIS — G56.02 CARPAL TUNNEL SYNDROME, LEFT UPPER LIMB: ICD-10-CM

## 2025-05-12 DIAGNOSIS — G56.01 CARPAL TUNNEL SYNDROME, RIGHT UPPER LIMB: ICD-10-CM

## 2025-05-12 PROCEDURE — 99203 OFFICE O/P NEW LOW 30 MIN: CPT

## 2025-05-21 NOTE — ED PROVIDER NOTE - MDM PATIENT STATEMENT FOR ADDL TREATMENT
Airway  Date/Time: 5/21/2025 8:33 AM  Reason: elective    Airway not difficult    Staffing  Performed: DANIEL   Authorized by: Susanne Jasso MD    Performed by: DANIEL Lundberg  Patient location during procedure: OR    Patient Condition  Indications for airway management: anesthesia  Patient position: sniffing  Planned trial extubation  Sedation level: deep     Final Airway Details   Preoxygenated: yes  Final airway type: endotracheal airway  Successful airway: ETT and CATIE tube  Cuffed: yes   Successful intubation technique: direct laryngoscopy  Endotracheal tube insertion site: right naris  Blade: Massiel  Blade size: #2  ETT size (mm): 4.5  Cormack-Lehane Classification: grade I - full view of glottis  Placement verified by: chest auscultation and capnometry   Measured from: nares  ETT to nares (cm): 19  Number of attempts at approach: 1  Number of other approaches attempted: 0    Additional Comments  Nares prepped with 0.05% oxymetazoline spray. 24 Fr nasal airway easily placed in right nare prior to intubation. Lips and teeth in pre-anesthetic condition.         Patient with one or more new problems requiring additional work-up/treatment.

## 2025-05-23 ENCOUNTER — APPOINTMENT (OUTPATIENT)
Dept: NEUROLOGY | Facility: CLINIC | Age: 75
End: 2025-05-23
Payer: MEDICARE

## 2025-05-23 PROCEDURE — 95886 MUSC TEST DONE W/N TEST COMP: CPT

## 2025-05-23 PROCEDURE — 95912 NRV CNDJ TEST 11-12 STUDIES: CPT

## 2025-05-28 ENCOUNTER — NON-APPOINTMENT (OUTPATIENT)
Age: 75
End: 2025-05-28

## 2025-06-03 ENCOUNTER — APPOINTMENT (OUTPATIENT)
Dept: ORTHOPEDIC SURGERY | Facility: CLINIC | Age: 75
End: 2025-06-03
Payer: MEDICARE

## 2025-06-03 ENCOUNTER — NON-APPOINTMENT (OUTPATIENT)
Age: 75
End: 2025-06-03

## 2025-06-03 DIAGNOSIS — M70.61 TROCHANTERIC BURSITIS, RIGHT HIP: ICD-10-CM

## 2025-06-03 PROCEDURE — 99212 OFFICE O/P EST SF 10 MIN: CPT

## 2025-06-05 ENCOUNTER — APPOINTMENT (OUTPATIENT)
Dept: ORTHOPEDIC SURGERY | Facility: AMBULATORY SURGERY CENTER | Age: 75
End: 2025-06-05
Payer: MEDICARE

## 2025-06-05 DIAGNOSIS — G56.01 CARPAL TUNNEL SYNDROME, RIGHT UPPER LIMB: ICD-10-CM

## 2025-06-05 DIAGNOSIS — G56.02 CARPAL TUNNEL SYNDROME, LEFT UPPER LIMB: ICD-10-CM

## 2025-06-05 PROCEDURE — 64721 CARPAL TUNNEL SURGERY: CPT | Mod: 50

## 2025-06-05 RX ORDER — IBUPROFEN 800 MG/1
800 TABLET, FILM COATED ORAL 3 TIMES DAILY
Qty: 20 | Refills: 0 | Status: ACTIVE | COMMUNITY
Start: 2025-06-05 | End: 1900-01-01

## 2025-06-16 ENCOUNTER — APPOINTMENT (OUTPATIENT)
Dept: ORTHOPEDIC SURGERY | Facility: CLINIC | Age: 75
End: 2025-06-16
Payer: MEDICARE

## 2025-06-16 PROCEDURE — 99024 POSTOP FOLLOW-UP VISIT: CPT
